# Patient Record
Sex: FEMALE | Race: WHITE | NOT HISPANIC OR LATINO | Employment: FULL TIME | URBAN - METROPOLITAN AREA
[De-identification: names, ages, dates, MRNs, and addresses within clinical notes are randomized per-mention and may not be internally consistent; named-entity substitution may affect disease eponyms.]

---

## 2017-02-13 ENCOUNTER — OFFICE VISIT - HEALTHEAST (OUTPATIENT)
Dept: FAMILY MEDICINE | Facility: CLINIC | Age: 42
End: 2017-02-13

## 2017-02-13 DIAGNOSIS — K21.9 GERD (GASTROESOPHAGEAL REFLUX DISEASE): ICD-10-CM

## 2017-02-13 DIAGNOSIS — F41.8 DEPRESSION WITH ANXIETY: ICD-10-CM

## 2017-02-13 DIAGNOSIS — D48.9 NEOPLASM OF UNCERTAIN BEHAVIOR: ICD-10-CM

## 2017-02-13 DIAGNOSIS — C73 MALIGNANT NEOPLASM OF THYROID GLAND (H): ICD-10-CM

## 2017-02-13 ASSESSMENT — MIFFLIN-ST. JEOR: SCORE: 1577.09

## 2017-02-15 ENCOUNTER — AMBULATORY - HEALTHEAST (OUTPATIENT)
Dept: FAMILY MEDICINE | Facility: CLINIC | Age: 42
End: 2017-02-15

## 2017-06-07 ENCOUNTER — COMMUNICATION - HEALTHEAST (OUTPATIENT)
Dept: ENDOCRINOLOGY | Facility: CLINIC | Age: 42
End: 2017-06-07

## 2017-06-07 DIAGNOSIS — C73 MALIGNANT NEOPLASM OF THYROID GLAND (H): ICD-10-CM

## 2017-06-07 DIAGNOSIS — E89.0 POSTSURGICAL HYPOTHYROIDISM: ICD-10-CM

## 2017-06-09 ENCOUNTER — COMMUNICATION - HEALTHEAST (OUTPATIENT)
Dept: ENDOCRINOLOGY | Facility: CLINIC | Age: 42
End: 2017-06-09

## 2017-06-09 DIAGNOSIS — E89.0 POSTSURGICAL HYPOTHYROIDISM: ICD-10-CM

## 2017-06-09 DIAGNOSIS — C73 MALIGNANT NEOPLASM OF THYROID GLAND (H): ICD-10-CM

## 2017-08-10 ENCOUNTER — OFFICE VISIT - HEALTHEAST (OUTPATIENT)
Dept: FAMILY MEDICINE | Facility: CLINIC | Age: 42
End: 2017-08-10

## 2017-08-10 DIAGNOSIS — K21.9 GASTROESOPHAGEAL REFLUX DISEASE WITHOUT ESOPHAGITIS: ICD-10-CM

## 2017-08-10 DIAGNOSIS — E66.9 OBESITY: ICD-10-CM

## 2017-08-10 ASSESSMENT — MIFFLIN-ST. JEOR: SCORE: 1545.34

## 2017-08-16 ENCOUNTER — COMMUNICATION - HEALTHEAST (OUTPATIENT)
Dept: FAMILY MEDICINE | Facility: CLINIC | Age: 42
End: 2017-08-16

## 2017-08-18 ENCOUNTER — RECORDS - HEALTHEAST (OUTPATIENT)
Dept: ADMINISTRATIVE | Facility: OTHER | Age: 42
End: 2017-08-18

## 2017-09-07 ENCOUNTER — OFFICE VISIT - HEALTHEAST (OUTPATIENT)
Dept: FAMILY MEDICINE | Facility: CLINIC | Age: 42
End: 2017-09-07

## 2017-09-07 DIAGNOSIS — J02.9 SORE THROAT: ICD-10-CM

## 2017-09-07 ASSESSMENT — MIFFLIN-ST. JEOR: SCORE: 1560.31

## 2017-09-25 ENCOUNTER — COMMUNICATION - HEALTHEAST (OUTPATIENT)
Dept: FAMILY MEDICINE | Facility: CLINIC | Age: 42
End: 2017-09-25

## 2017-09-25 DIAGNOSIS — F41.8 DEPRESSION WITH ANXIETY: ICD-10-CM

## 2017-11-03 ENCOUNTER — OFFICE VISIT - HEALTHEAST (OUTPATIENT)
Dept: FAMILY MEDICINE | Facility: CLINIC | Age: 42
End: 2017-11-03

## 2017-11-03 DIAGNOSIS — R10.13 EPIGASTRIC PAIN: ICD-10-CM

## 2017-11-03 DIAGNOSIS — K21.9 GASTROESOPHAGEAL REFLUX DISEASE WITHOUT ESOPHAGITIS: ICD-10-CM

## 2017-11-10 ENCOUNTER — RECORDS - HEALTHEAST (OUTPATIENT)
Dept: ADMINISTRATIVE | Facility: OTHER | Age: 42
End: 2017-11-10

## 2017-11-13 ENCOUNTER — RECORDS - HEALTHEAST (OUTPATIENT)
Dept: ADMINISTRATIVE | Facility: OTHER | Age: 42
End: 2017-11-13

## 2017-12-19 ENCOUNTER — RECORDS - HEALTHEAST (OUTPATIENT)
Dept: LAB | Facility: HOSPITAL | Age: 42
End: 2017-12-19

## 2017-12-20 ENCOUNTER — RECORDS - HEALTHEAST (OUTPATIENT)
Dept: ADMINISTRATIVE | Facility: OTHER | Age: 42
End: 2017-12-20

## 2017-12-20 LAB — HBV SURFACE AB SERPL IA-ACNC: POSITIVE M[IU]/ML

## 2018-01-12 ENCOUNTER — COMMUNICATION - HEALTHEAST (OUTPATIENT)
Dept: FAMILY MEDICINE | Facility: CLINIC | Age: 43
End: 2018-01-12

## 2018-01-12 DIAGNOSIS — F41.8 DEPRESSION WITH ANXIETY: ICD-10-CM

## 2018-01-17 ENCOUNTER — COMMUNICATION - HEALTHEAST (OUTPATIENT)
Dept: FAMILY MEDICINE | Facility: CLINIC | Age: 43
End: 2018-01-17

## 2018-01-17 DIAGNOSIS — K21.9 GASTROESOPHAGEAL REFLUX DISEASE WITHOUT ESOPHAGITIS: ICD-10-CM

## 2018-01-19 ENCOUNTER — COMMUNICATION - HEALTHEAST (OUTPATIENT)
Dept: FAMILY MEDICINE | Facility: CLINIC | Age: 43
End: 2018-01-19

## 2018-01-23 ENCOUNTER — OFFICE VISIT - HEALTHEAST (OUTPATIENT)
Dept: FAMILY MEDICINE | Facility: CLINIC | Age: 43
End: 2018-01-23

## 2018-01-23 DIAGNOSIS — R79.89 ELEVATED LFTS: ICD-10-CM

## 2018-01-23 DIAGNOSIS — K21.9 GERD (GASTROESOPHAGEAL REFLUX DISEASE): ICD-10-CM

## 2018-01-23 DIAGNOSIS — Z01.818 PRE-OP EXAMINATION: ICD-10-CM

## 2018-01-23 DIAGNOSIS — R10.13 EPIGASTRIC PAIN: ICD-10-CM

## 2018-01-23 DIAGNOSIS — R74.8 ELEVATED ALKALINE PHOSPHATASE LEVEL: ICD-10-CM

## 2018-01-23 DIAGNOSIS — R17 ELEVATED BILIRUBIN: ICD-10-CM

## 2018-01-23 LAB
ALBUMIN SERPL-MCNC: 3.3 G/DL (ref 3.5–5)
ALP SERPL-CCNC: 96 U/L (ref 45–120)
ALT SERPL W P-5'-P-CCNC: 24 U/L (ref 0–45)
ANION GAP SERPL CALCULATED.3IONS-SCNC: 6 MMOL/L (ref 5–18)
AST SERPL W P-5'-P-CCNC: 20 U/L (ref 0–40)
BILIRUB SERPL-MCNC: 0.9 MG/DL (ref 0–1)
BUN SERPL-MCNC: 13 MG/DL (ref 8–22)
CALCIUM SERPL-MCNC: 9.2 MG/DL (ref 8.5–10.5)
CHLORIDE BLD-SCNC: 104 MMOL/L (ref 98–107)
CO2 SERPL-SCNC: 28 MMOL/L (ref 22–31)
CREAT SERPL-MCNC: 0.64 MG/DL (ref 0.6–1.1)
ERYTHROCYTE [DISTWIDTH] IN BLOOD BY AUTOMATED COUNT: 12.2 % (ref 11–14.5)
GFR SERPL CREATININE-BSD FRML MDRD: >60 ML/MIN/1.73M2
GLUCOSE BLD-MCNC: 83 MG/DL (ref 70–125)
HCG UR QL: NEGATIVE
HCT VFR BLD AUTO: 35.5 % (ref 35–47)
HGB BLD-MCNC: 12.2 G/DL (ref 12–16)
MCH RBC QN AUTO: 29.5 PG (ref 27–34)
MCHC RBC AUTO-ENTMCNC: 34.4 G/DL (ref 32–36)
MCV RBC AUTO: 86 FL (ref 80–100)
PLATELET # BLD AUTO: 238 THOU/UL (ref 140–440)
PMV BLD AUTO: 7.3 FL (ref 7–10)
POTASSIUM BLD-SCNC: 4 MMOL/L (ref 3.5–5)
PROT SERPL-MCNC: 6.5 G/DL (ref 6–8)
RBC # BLD AUTO: 4.14 MILL/UL (ref 3.8–5.4)
SODIUM SERPL-SCNC: 138 MMOL/L (ref 136–145)
SP GR UR STRIP: 1.01 (ref 1–1.03)
WBC: 6.8 THOU/UL (ref 4–11)

## 2018-01-23 ASSESSMENT — MIFFLIN-ST. JEOR: SCORE: 1536.27

## 2018-05-21 ENCOUNTER — COMMUNICATION - HEALTHEAST (OUTPATIENT)
Dept: ENDOCRINOLOGY | Facility: CLINIC | Age: 43
End: 2018-05-21

## 2018-05-21 DIAGNOSIS — E89.0 POSTSURGICAL HYPOTHYROIDISM: ICD-10-CM

## 2018-05-21 DIAGNOSIS — C73 MALIGNANT NEOPLASM OF THYROID GLAND (H): ICD-10-CM

## 2018-06-04 ENCOUNTER — COMMUNICATION - HEALTHEAST (OUTPATIENT)
Dept: ENDOCRINOLOGY | Facility: CLINIC | Age: 43
End: 2018-06-04

## 2018-06-04 DIAGNOSIS — E89.0 POSTSURGICAL HYPOTHYROIDISM: ICD-10-CM

## 2018-06-04 DIAGNOSIS — C73 MALIGNANT NEOPLASM OF THYROID GLAND (H): ICD-10-CM

## 2018-06-21 ENCOUNTER — OFFICE VISIT - HEALTHEAST (OUTPATIENT)
Dept: FAMILY MEDICINE | Facility: CLINIC | Age: 43
End: 2018-06-21

## 2018-06-21 DIAGNOSIS — J45.909 ASTHMA: ICD-10-CM

## 2018-06-21 DIAGNOSIS — R05.9 COUGH: ICD-10-CM

## 2018-06-21 ASSESSMENT — MIFFLIN-ST. JEOR: SCORE: 1545.34

## 2018-08-16 ENCOUNTER — COMMUNICATION - HEALTHEAST (OUTPATIENT)
Dept: FAMILY MEDICINE | Facility: CLINIC | Age: 43
End: 2018-08-16

## 2018-08-20 ENCOUNTER — COMMUNICATION - HEALTHEAST (OUTPATIENT)
Dept: FAMILY MEDICINE | Facility: CLINIC | Age: 43
End: 2018-08-20

## 2018-08-23 ENCOUNTER — AMBULATORY - HEALTHEAST (OUTPATIENT)
Dept: FAMILY MEDICINE | Facility: CLINIC | Age: 43
End: 2018-08-23

## 2018-08-23 DIAGNOSIS — M72.2 PLANTAR FASCIITIS: ICD-10-CM

## 2018-08-28 ENCOUNTER — AMBULATORY - HEALTHEAST (OUTPATIENT)
Dept: ENDOCRINOLOGY | Facility: CLINIC | Age: 43
End: 2018-08-28

## 2018-08-28 DIAGNOSIS — E89.0 POSTSURGICAL HYPOTHYROIDISM: ICD-10-CM

## 2018-09-02 ENCOUNTER — COMMUNICATION - HEALTHEAST (OUTPATIENT)
Dept: ENDOCRINOLOGY | Facility: CLINIC | Age: 43
End: 2018-09-02

## 2018-09-02 ENCOUNTER — RECORDS - HEALTHEAST (OUTPATIENT)
Dept: ADMINISTRATIVE | Facility: OTHER | Age: 43
End: 2018-09-02

## 2018-09-02 DIAGNOSIS — E89.0 POSTSURGICAL HYPOTHYROIDISM: ICD-10-CM

## 2018-09-02 DIAGNOSIS — C73 MALIGNANT NEOPLASM OF THYROID GLAND (H): ICD-10-CM

## 2018-09-10 ENCOUNTER — AMBULATORY - HEALTHEAST (OUTPATIENT)
Dept: LAB | Facility: CLINIC | Age: 43
End: 2018-09-10

## 2018-09-10 DIAGNOSIS — E89.0 POSTSURGICAL HYPOTHYROIDISM: ICD-10-CM

## 2018-09-10 LAB
CREAT SERPL-MCNC: 0.62 MG/DL (ref 0.6–1.1)
GFR SERPL CREATININE-BSD FRML MDRD: >60 ML/MIN/1.73M2
HBA1C MFR BLD: 5.1 % (ref 3.5–6)
POTASSIUM BLD-SCNC: 3.9 MMOL/L (ref 3.5–5)
T3 SERPL-MCNC: 93 NG/DL (ref 45–175)
T4 FREE SERPL-MCNC: 1.5 NG/DL (ref 0.7–1.8)
TSH SERPL DL<=0.005 MIU/L-ACNC: 0.05 UIU/ML (ref 0.3–5)

## 2018-09-11 ENCOUNTER — RECORDS - HEALTHEAST (OUTPATIENT)
Dept: ADMINISTRATIVE | Facility: OTHER | Age: 43
End: 2018-09-11

## 2018-09-11 LAB
THYROGLOB AB SERPL-ACNC: <0.9 IU/ML (ref 0–4)
THYROGLOB SERPL-MCNC: ABNORMAL NG/ML (ref 1.3–31.8)
THYROGLOBULIN, SERUM OR: 0.1 NG/ML (ref 1.3–31.8)

## 2018-09-19 ENCOUNTER — COMMUNICATION - HEALTHEAST (OUTPATIENT)
Dept: FAMILY MEDICINE | Facility: CLINIC | Age: 43
End: 2018-09-19

## 2018-09-19 DIAGNOSIS — F41.8 DEPRESSION WITH ANXIETY: ICD-10-CM

## 2018-09-28 ENCOUNTER — COMMUNICATION - HEALTHEAST (OUTPATIENT)
Dept: ENDOCRINOLOGY | Facility: CLINIC | Age: 43
End: 2018-09-28

## 2018-09-28 DIAGNOSIS — E89.0 POSTSURGICAL HYPOTHYROIDISM: ICD-10-CM

## 2018-09-28 DIAGNOSIS — C73 MALIGNANT NEOPLASM OF THYROID GLAND (H): ICD-10-CM

## 2018-10-02 ENCOUNTER — RECORDS - HEALTHEAST (OUTPATIENT)
Dept: ADMINISTRATIVE | Facility: OTHER | Age: 43
End: 2018-10-02

## 2018-12-31 ENCOUNTER — COMMUNICATION - HEALTHEAST (OUTPATIENT)
Dept: ENDOCRINOLOGY | Facility: CLINIC | Age: 43
End: 2018-12-31

## 2018-12-31 DIAGNOSIS — C73 MALIGNANT NEOPLASM OF THYROID GLAND (H): ICD-10-CM

## 2018-12-31 DIAGNOSIS — E89.0 POSTSURGICAL HYPOTHYROIDISM: ICD-10-CM

## 2019-01-22 ENCOUNTER — OFFICE VISIT - HEALTHEAST (OUTPATIENT)
Dept: ENDOCRINOLOGY | Facility: CLINIC | Age: 44
End: 2019-01-22

## 2019-01-22 DIAGNOSIS — C73 MALIGNANT NEOPLASM OF THYROID GLAND (H): ICD-10-CM

## 2019-01-22 DIAGNOSIS — E89.0 POSTSURGICAL HYPOTHYROIDISM: ICD-10-CM

## 2019-01-22 ASSESSMENT — MIFFLIN-ST. JEOR: SCORE: 1573.01

## 2019-05-23 ENCOUNTER — OFFICE VISIT - HEALTHEAST (OUTPATIENT)
Dept: FAMILY MEDICINE | Facility: CLINIC | Age: 44
End: 2019-05-23

## 2019-05-23 DIAGNOSIS — F33.0 MILD EPISODE OF RECURRENT MAJOR DEPRESSIVE DISORDER (H): ICD-10-CM

## 2019-05-23 DIAGNOSIS — J45.21 MILD INTERMITTENT ASTHMA WITH ACUTE EXACERBATION: ICD-10-CM

## 2019-05-23 DIAGNOSIS — Z12.31 ENCOUNTER FOR SCREENING MAMMOGRAM FOR BREAST CANCER: ICD-10-CM

## 2019-05-23 DIAGNOSIS — L30.9 ECZEMA OF HAND: ICD-10-CM

## 2019-05-23 DIAGNOSIS — R87.610 PAPANICOLAOU SMEAR OF CERVIX WITH ATYPICAL SQUAMOUS CELLS OF UNDETERMINED SIGNIFICANCE (ASC-US): ICD-10-CM

## 2019-05-23 DIAGNOSIS — E66.9 OBESITY (BMI 35.0-39.9 WITHOUT COMORBIDITY): ICD-10-CM

## 2019-05-23 RX ORDER — ALBUTEROL SULFATE 90 UG/1
2 AEROSOL, METERED RESPIRATORY (INHALATION) EVERY 6 HOURS PRN
Qty: 1 INHALER | Refills: 3 | Status: SHIPPED | OUTPATIENT
Start: 2019-05-23 | End: 2023-03-08

## 2019-05-23 ASSESSMENT — MIFFLIN-ST. JEOR: SCORE: 1547.61

## 2019-05-29 ENCOUNTER — COMMUNICATION - HEALTHEAST (OUTPATIENT)
Dept: FAMILY MEDICINE | Facility: CLINIC | Age: 44
End: 2019-05-29

## 2019-05-29 DIAGNOSIS — J45.21 MILD INTERMITTENT ASTHMA WITH ACUTE EXACERBATION: ICD-10-CM

## 2019-06-04 ENCOUNTER — OFFICE VISIT - HEALTHEAST (OUTPATIENT)
Dept: FAMILY MEDICINE | Facility: CLINIC | Age: 44
End: 2019-06-04

## 2019-06-04 ENCOUNTER — COMMUNICATION - HEALTHEAST (OUTPATIENT)
Dept: SCHEDULING | Facility: CLINIC | Age: 44
End: 2019-06-04

## 2019-06-04 DIAGNOSIS — R32 URINE INCONTINENCE: ICD-10-CM

## 2019-06-04 DIAGNOSIS — K85.90 ACUTE PANCREATITIS, UNSPECIFIED COMPLICATION STATUS, UNSPECIFIED PANCREATITIS TYPE: ICD-10-CM

## 2019-06-12 ENCOUNTER — RECORDS - HEALTHEAST (OUTPATIENT)
Dept: ADMINISTRATIVE | Facility: OTHER | Age: 44
End: 2019-06-12

## 2019-06-19 ENCOUNTER — COMMUNICATION - HEALTHEAST (OUTPATIENT)
Dept: CARE COORDINATION | Facility: CLINIC | Age: 44
End: 2019-06-19

## 2019-06-21 ENCOUNTER — AMBULATORY - HEALTHEAST (OUTPATIENT)
Dept: FAMILY MEDICINE | Facility: CLINIC | Age: 44
End: 2019-06-21

## 2019-06-21 ENCOUNTER — OFFICE VISIT - HEALTHEAST (OUTPATIENT)
Dept: FAMILY MEDICINE | Facility: CLINIC | Age: 44
End: 2019-06-21

## 2019-06-21 DIAGNOSIS — R10.9 ABDOMINAL PAIN, UNSPECIFIED ABDOMINAL LOCATION: ICD-10-CM

## 2019-06-21 DIAGNOSIS — Z09 ENCOUNTER FOR EXAMINATION FOLLOWING TREATMENT AT HOSPITAL: ICD-10-CM

## 2019-06-21 DIAGNOSIS — J45.30 MILD PERSISTENT ASTHMA WITHOUT COMPLICATION: ICD-10-CM

## 2019-06-21 DIAGNOSIS — R17 JAUNDICE: ICD-10-CM

## 2019-06-21 ASSESSMENT — MIFFLIN-ST. JEOR: SCORE: 1549.87

## 2019-06-25 ENCOUNTER — HOSPITAL ENCOUNTER (OUTPATIENT)
Dept: ULTRASOUND IMAGING | Facility: CLINIC | Age: 44
Discharge: HOME | End: 2019-06-25
Attending: INTERNAL MEDICINE

## 2019-06-25 DIAGNOSIS — C73 MALIGNANT NEOPLASM OF THYROID GLAND (H): ICD-10-CM

## 2019-06-26 ENCOUNTER — COMMUNICATION - HEALTHEAST (OUTPATIENT)
Dept: ENDOCRINOLOGY | Facility: CLINIC | Age: 44
End: 2019-06-26

## 2019-07-15 ENCOUNTER — RECORDS - HEALTHEAST (OUTPATIENT)
Dept: ADMINISTRATIVE | Facility: OTHER | Age: 44
End: 2019-07-15

## 2019-07-31 ENCOUNTER — COMMUNICATION - HEALTHEAST (OUTPATIENT)
Dept: ENDOCRINOLOGY | Facility: CLINIC | Age: 44
End: 2019-07-31

## 2019-07-31 DIAGNOSIS — E89.0 POSTSURGICAL HYPOTHYROIDISM: ICD-10-CM

## 2019-07-31 DIAGNOSIS — C73 MALIGNANT NEOPLASM OF THYROID GLAND (H): ICD-10-CM

## 2019-08-01 ENCOUNTER — COMMUNICATION - HEALTHEAST (OUTPATIENT)
Dept: FAMILY MEDICINE | Facility: CLINIC | Age: 44
End: 2019-08-01

## 2019-08-02 ENCOUNTER — HOSPITAL ENCOUNTER (OUTPATIENT)
Dept: MAMMOGRAPHY | Facility: CLINIC | Age: 44
Discharge: HOME | End: 2019-08-02

## 2019-08-02 DIAGNOSIS — Z12.31 ENCOUNTER FOR SCREENING MAMMOGRAM FOR BREAST CANCER: ICD-10-CM

## 2019-08-06 ENCOUNTER — COMMUNICATION - HEALTHEAST (OUTPATIENT)
Dept: ENDOCRINOLOGY | Facility: CLINIC | Age: 44
End: 2019-08-06

## 2019-08-06 DIAGNOSIS — C73 MALIGNANT NEOPLASM OF THYROID GLAND (H): ICD-10-CM

## 2019-08-06 DIAGNOSIS — E89.0 POSTSURGICAL HYPOTHYROIDISM: ICD-10-CM

## 2019-08-09 ENCOUNTER — COMMUNICATION - HEALTHEAST (OUTPATIENT)
Dept: FAMILY MEDICINE | Facility: CLINIC | Age: 44
End: 2019-08-09

## 2019-08-13 ENCOUNTER — OFFICE VISIT - HEALTHEAST (OUTPATIENT)
Dept: FAMILY MEDICINE | Facility: CLINIC | Age: 44
End: 2019-08-13

## 2019-08-13 DIAGNOSIS — Z01.818 ENCOUNTER FOR PREOPERATIVE EXAMINATION FOR GENERAL SURGICAL PROCEDURE: ICD-10-CM

## 2019-08-13 DIAGNOSIS — R10.13 EPIGASTRIC PAIN: ICD-10-CM

## 2019-08-13 DIAGNOSIS — R79.89 ELEVATED LFTS: ICD-10-CM

## 2019-08-13 DIAGNOSIS — R74.8 ELEVATED ALKALINE PHOSPHATASE LEVEL: ICD-10-CM

## 2019-08-13 DIAGNOSIS — E66.01 MORBID OBESITY (H): ICD-10-CM

## 2019-08-13 DIAGNOSIS — L91.8 SKIN TAG: ICD-10-CM

## 2019-08-13 DIAGNOSIS — R17 ELEVATED BILIRUBIN: ICD-10-CM

## 2019-08-13 DIAGNOSIS — K83.4 SPHINCTER OF ODDI DYSFUNCTION: ICD-10-CM

## 2019-08-13 LAB
ANION GAP SERPL CALCULATED.3IONS-SCNC: 8 MMOL/L (ref 5–18)
BUN SERPL-MCNC: 14 MG/DL (ref 8–22)
CALCIUM SERPL-MCNC: 9.9 MG/DL (ref 8.5–10.5)
CHLORIDE BLD-SCNC: 108 MMOL/L (ref 98–107)
CO2 SERPL-SCNC: 26 MMOL/L (ref 22–31)
CREAT SERPL-MCNC: 0.66 MG/DL (ref 0.6–1.1)
GFR SERPL CREATININE-BSD FRML MDRD: >60 ML/MIN/1.73M2
GLUCOSE BLD-MCNC: 89 MG/DL (ref 70–125)
HCG UR QL: NEGATIVE
HGB BLD-MCNC: 13 G/DL (ref 12–16)
POTASSIUM BLD-SCNC: 4 MMOL/L (ref 3.5–5)
SODIUM SERPL-SCNC: 142 MMOL/L (ref 136–145)

## 2019-08-13 ASSESSMENT — MIFFLIN-ST. JEOR: SCORE: 1572.55

## 2019-08-16 ENCOUNTER — ANESTHESIA - HEALTHEAST (OUTPATIENT)
Dept: SURGERY | Facility: HOSPITAL | Age: 44
End: 2019-08-16

## 2019-08-16 ENCOUNTER — SURGERY - HEALTHEAST (OUTPATIENT)
Dept: SURGERY | Facility: HOSPITAL | Age: 44
End: 2019-08-16

## 2019-08-30 ENCOUNTER — COMMUNICATION - HEALTHEAST (OUTPATIENT)
Dept: ADMINISTRATIVE | Facility: CLINIC | Age: 44
End: 2019-08-30

## 2019-10-12 ENCOUNTER — COMMUNICATION - HEALTHEAST (OUTPATIENT)
Dept: ENDOCRINOLOGY | Facility: CLINIC | Age: 44
End: 2019-10-12

## 2019-10-12 DIAGNOSIS — E89.0 POSTSURGICAL HYPOTHYROIDISM: ICD-10-CM

## 2019-10-12 DIAGNOSIS — C73 MALIGNANT NEOPLASM OF THYROID GLAND (H): ICD-10-CM

## 2019-10-14 ENCOUNTER — COMMUNICATION - HEALTHEAST (OUTPATIENT)
Dept: FAMILY MEDICINE | Facility: CLINIC | Age: 44
End: 2019-10-14

## 2019-10-16 ENCOUNTER — OFFICE VISIT - HEALTHEAST (OUTPATIENT)
Dept: FAMILY MEDICINE | Facility: CLINIC | Age: 44
End: 2019-10-16

## 2019-10-16 ENCOUNTER — COMMUNICATION - HEALTHEAST (OUTPATIENT)
Dept: SCHEDULING | Facility: CLINIC | Age: 44
End: 2019-10-16

## 2019-10-16 DIAGNOSIS — E89.0 POSTSURGICAL HYPOTHYROIDISM: ICD-10-CM

## 2019-10-16 DIAGNOSIS — C73 MALIGNANT NEOPLASM OF THYROID GLAND (H): ICD-10-CM

## 2019-10-16 LAB
T3FREE SERPL-MCNC: 3.9 PG/ML (ref 1.9–3.9)
T4 FREE SERPL-MCNC: 1.3 NG/DL (ref 0.7–1.8)
TSH SERPL DL<=0.005 MIU/L-ACNC: 0.03 UIU/ML (ref 0.3–5)

## 2019-10-16 ASSESSMENT — ANXIETY QUESTIONNAIRES
6. BECOMING EASILY ANNOYED OR IRRITABLE: MORE THAN HALF THE DAYS
7. FEELING AFRAID AS IF SOMETHING AWFUL MIGHT HAPPEN: NOT AT ALL
2. NOT BEING ABLE TO STOP OR CONTROL WORRYING: NOT AT ALL
1. FEELING NERVOUS, ANXIOUS, OR ON EDGE: MORE THAN HALF THE DAYS
IF YOU CHECKED OFF ANY PROBLEMS ON THIS QUESTIONNAIRE, HOW DIFFICULT HAVE THESE PROBLEMS MADE IT FOR YOU TO DO YOUR WORK, TAKE CARE OF THINGS AT HOME, OR GET ALONG WITH OTHER PEOPLE: SOMEWHAT DIFFICULT
5. BEING SO RESTLESS THAT IT IS HARD TO SIT STILL: NOT AT ALL
4. TROUBLE RELAXING: NOT AT ALL
3. WORRYING TOO MUCH ABOUT DIFFERENT THINGS: NOT AT ALL
GAD7 TOTAL SCORE: 4

## 2019-10-16 ASSESSMENT — PATIENT HEALTH QUESTIONNAIRE - PHQ9: SUM OF ALL RESPONSES TO PHQ QUESTIONS 1-9: 1

## 2019-12-03 ENCOUNTER — COMMUNICATION - HEALTHEAST (OUTPATIENT)
Dept: FAMILY MEDICINE | Facility: CLINIC | Age: 44
End: 2019-12-03

## 2019-12-03 DIAGNOSIS — F41.8 DEPRESSION WITH ANXIETY: ICD-10-CM

## 2019-12-07 ENCOUNTER — COMMUNICATION - HEALTHEAST (OUTPATIENT)
Dept: FAMILY MEDICINE | Facility: CLINIC | Age: 44
End: 2019-12-07

## 2019-12-07 DIAGNOSIS — J45.21 MILD INTERMITTENT ASTHMA WITH ACUTE EXACERBATION: ICD-10-CM

## 2019-12-11 ENCOUNTER — COMMUNICATION - HEALTHEAST (OUTPATIENT)
Dept: FAMILY MEDICINE | Facility: CLINIC | Age: 44
End: 2019-12-11

## 2019-12-11 DIAGNOSIS — K83.4 SPHINCTER OF ODDI DYSFUNCTION: ICD-10-CM

## 2020-01-04 ENCOUNTER — COMMUNICATION - HEALTHEAST (OUTPATIENT)
Dept: FAMILY MEDICINE | Facility: CLINIC | Age: 45
End: 2020-01-04

## 2020-01-04 DIAGNOSIS — J45.21 MILD INTERMITTENT ASTHMA WITH ACUTE EXACERBATION: ICD-10-CM

## 2020-01-22 ENCOUNTER — COMMUNICATION - HEALTHEAST (OUTPATIENT)
Dept: FAMILY MEDICINE | Facility: CLINIC | Age: 45
End: 2020-01-22

## 2020-01-22 DIAGNOSIS — C73 MALIGNANT NEOPLASM OF THYROID GLAND (H): ICD-10-CM

## 2020-01-22 DIAGNOSIS — K83.4 SPHINCTER OF ODDI DYSFUNCTION: ICD-10-CM

## 2020-01-22 DIAGNOSIS — E89.0 POSTSURGICAL HYPOTHYROIDISM: ICD-10-CM

## 2020-02-19 ENCOUNTER — COMMUNICATION - HEALTHEAST (OUTPATIENT)
Dept: FAMILY MEDICINE | Facility: CLINIC | Age: 45
End: 2020-02-19

## 2020-02-27 ENCOUNTER — OFFICE VISIT - HEALTHEAST (OUTPATIENT)
Dept: FAMILY MEDICINE | Facility: CLINIC | Age: 45
End: 2020-02-27

## 2020-02-27 DIAGNOSIS — N76.0 VAGINITIS AND VULVOVAGINITIS: ICD-10-CM

## 2020-02-27 DIAGNOSIS — N95.2 ATROPHIC VAGINITIS: ICD-10-CM

## 2020-02-28 ENCOUNTER — OFFICE VISIT - HEALTHEAST (OUTPATIENT)
Dept: FAMILY MEDICINE | Facility: CLINIC | Age: 45
End: 2020-02-28

## 2020-02-28 DIAGNOSIS — N89.8 VAGINAL ITCHING: ICD-10-CM

## 2020-02-28 LAB
CLUE CELLS: NORMAL
TRICHOMONAS, WET PREP: NORMAL
YEAST, WET PREP: NORMAL

## 2020-02-28 ASSESSMENT — MIFFLIN-ST. JEOR: SCORE: 1605.72

## 2020-08-06 ENCOUNTER — COMMUNICATION - HEALTHEAST (OUTPATIENT)
Dept: FAMILY MEDICINE | Facility: CLINIC | Age: 45
End: 2020-08-06

## 2020-08-06 DIAGNOSIS — E89.0 POSTSURGICAL HYPOTHYROIDISM: ICD-10-CM

## 2020-08-06 DIAGNOSIS — C73 MALIGNANT NEOPLASM OF THYROID GLAND (H): ICD-10-CM

## 2020-12-01 ENCOUNTER — COMMUNICATION - HEALTHEAST (OUTPATIENT)
Dept: FAMILY MEDICINE | Facility: CLINIC | Age: 45
End: 2020-12-01

## 2020-12-01 DIAGNOSIS — C73 MALIGNANT NEOPLASM OF THYROID GLAND (H): ICD-10-CM

## 2020-12-01 DIAGNOSIS — E89.0 POSTSURGICAL HYPOTHYROIDISM: ICD-10-CM

## 2020-12-09 ENCOUNTER — OFFICE VISIT - HEALTHEAST (OUTPATIENT)
Dept: FAMILY MEDICINE | Facility: CLINIC | Age: 45
End: 2020-12-09

## 2020-12-09 ENCOUNTER — COMMUNICATION - HEALTHEAST (OUTPATIENT)
Dept: FAMILY MEDICINE | Facility: CLINIC | Age: 45
End: 2020-12-09

## 2020-12-09 DIAGNOSIS — K21.9 GASTROESOPHAGEAL REFLUX DISEASE WITHOUT ESOPHAGITIS: ICD-10-CM

## 2020-12-09 DIAGNOSIS — E89.0 POSTSURGICAL HYPOTHYROIDISM: ICD-10-CM

## 2020-12-09 DIAGNOSIS — J45.30 MILD PERSISTENT ASTHMA WITHOUT COMPLICATION: ICD-10-CM

## 2020-12-09 DIAGNOSIS — R79.89 ELEVATED LFTS: ICD-10-CM

## 2020-12-09 DIAGNOSIS — Z13.220 LIPID SCREENING: ICD-10-CM

## 2020-12-09 DIAGNOSIS — Z12.31 ENCOUNTER FOR SCREENING MAMMOGRAM FOR BREAST CANCER: ICD-10-CM

## 2020-12-09 DIAGNOSIS — C73 MALIGNANT NEOPLASM OF THYROID GLAND (H): ICD-10-CM

## 2020-12-09 DIAGNOSIS — Z11.4 SCREENING FOR HIV (HUMAN IMMUNODEFICIENCY VIRUS): ICD-10-CM

## 2020-12-09 DIAGNOSIS — F41.8 DEPRESSION WITH ANXIETY: ICD-10-CM

## 2020-12-09 DIAGNOSIS — E66.01 MORBID OBESITY (H): ICD-10-CM

## 2020-12-09 LAB
ALBUMIN SERPL-MCNC: 3.7 G/DL (ref 3.5–5)
ALP SERPL-CCNC: 95 U/L (ref 45–120)
ALT SERPL W P-5'-P-CCNC: 19 U/L (ref 0–45)
ANION GAP SERPL CALCULATED.3IONS-SCNC: 6 MMOL/L (ref 5–18)
AST SERPL W P-5'-P-CCNC: 19 U/L (ref 0–40)
BILIRUB SERPL-MCNC: 1.3 MG/DL (ref 0–1)
BUN SERPL-MCNC: 20 MG/DL (ref 8–22)
CALCIUM SERPL-MCNC: 9.4 MG/DL (ref 8.5–10.5)
CHLORIDE BLD-SCNC: 105 MMOL/L (ref 98–107)
CHOLEST SERPL-MCNC: 161 MG/DL
CO2 SERPL-SCNC: 27 MMOL/L (ref 22–31)
CREAT SERPL-MCNC: 0.72 MG/DL (ref 0.6–1.1)
FASTING STATUS PATIENT QL REPORTED: NO
GFR SERPL CREATININE-BSD FRML MDRD: >60 ML/MIN/1.73M2
GLUCOSE BLD-MCNC: 95 MG/DL (ref 70–125)
HDLC SERPL-MCNC: 60 MG/DL
HIV 1+2 AB+HIV1 P24 AG SERPL QL IA: NEGATIVE
LDLC SERPL CALC-MCNC: 83 MG/DL
POTASSIUM BLD-SCNC: 4 MMOL/L (ref 3.5–5)
PROT SERPL-MCNC: 6.7 G/DL (ref 6–8)
SODIUM SERPL-SCNC: 138 MMOL/L (ref 136–145)
T4 FREE SERPL-MCNC: 1.2 NG/DL (ref 0.7–1.8)
TRIGL SERPL-MCNC: 91 MG/DL
TSH SERPL DL<=0.005 MIU/L-ACNC: 0.05 UIU/ML (ref 0.3–5)

## 2020-12-09 ASSESSMENT — ANXIETY QUESTIONNAIRES
6. BECOMING EASILY ANNOYED OR IRRITABLE: MORE THAN HALF THE DAYS
IF YOU CHECKED OFF ANY PROBLEMS ON THIS QUESTIONNAIRE, HOW DIFFICULT HAVE THESE PROBLEMS MADE IT FOR YOU TO DO YOUR WORK, TAKE CARE OF THINGS AT HOME, OR GET ALONG WITH OTHER PEOPLE: SOMEWHAT DIFFICULT
7. FEELING AFRAID AS IF SOMETHING AWFUL MIGHT HAPPEN: SEVERAL DAYS
1. FEELING NERVOUS, ANXIOUS, OR ON EDGE: MORE THAN HALF THE DAYS
4. TROUBLE RELAXING: NOT AT ALL
5. BEING SO RESTLESS THAT IT IS HARD TO SIT STILL: NOT AT ALL
2. NOT BEING ABLE TO STOP OR CONTROL WORRYING: SEVERAL DAYS
3. WORRYING TOO MUCH ABOUT DIFFERENT THINGS: SEVERAL DAYS
GAD7 TOTAL SCORE: 7

## 2020-12-09 ASSESSMENT — PATIENT HEALTH QUESTIONNAIRE - PHQ9: SUM OF ALL RESPONSES TO PHQ QUESTIONS 1-9: 7

## 2020-12-09 ASSESSMENT — MIFFLIN-ST. JEOR: SCORE: 1548.74

## 2021-02-08 ENCOUNTER — OFFICE VISIT - HEALTHEAST (OUTPATIENT)
Dept: FAMILY MEDICINE | Facility: CLINIC | Age: 46
End: 2021-02-08

## 2021-02-08 DIAGNOSIS — E89.0 POSTSURGICAL HYPOTHYROIDISM: ICD-10-CM

## 2021-02-08 DIAGNOSIS — L30.9 ECZEMA OF HAND: ICD-10-CM

## 2021-02-08 DIAGNOSIS — C73 MALIGNANT NEOPLASM OF THYROID GLAND (H): ICD-10-CM

## 2021-02-08 DIAGNOSIS — K83.4 SPHINCTER OF ODDI DYSFUNCTION: ICD-10-CM

## 2021-02-08 DIAGNOSIS — F41.8 DEPRESSION WITH ANXIETY: ICD-10-CM

## 2021-02-08 RX ORDER — TRIAMCINOLONE ACETONIDE 5 MG/G
CREAM TOPICAL
Qty: 30 G | Refills: 2 | Status: SHIPPED | OUTPATIENT
Start: 2021-02-08 | End: 2022-10-13

## 2021-02-08 RX ORDER — LEVOTHYROXINE SODIUM 200 UG/1
TABLET ORAL
Qty: 90 TABLET | Refills: 1 | Status: SHIPPED | OUTPATIENT
Start: 2021-02-08 | End: 2021-08-20

## 2021-02-08 ASSESSMENT — ANXIETY QUESTIONNAIRES
6. BECOMING EASILY ANNOYED OR IRRITABLE: MORE THAN HALF THE DAYS
3. WORRYING TOO MUCH ABOUT DIFFERENT THINGS: NEARLY EVERY DAY
1. FEELING NERVOUS, ANXIOUS, OR ON EDGE: MORE THAN HALF THE DAYS
4. TROUBLE RELAXING: MORE THAN HALF THE DAYS
5. BEING SO RESTLESS THAT IT IS HARD TO SIT STILL: NOT AT ALL
IF YOU CHECKED OFF ANY PROBLEMS ON THIS QUESTIONNAIRE, HOW DIFFICULT HAVE THESE PROBLEMS MADE IT FOR YOU TO DO YOUR WORK, TAKE CARE OF THINGS AT HOME, OR GET ALONG WITH OTHER PEOPLE: SOMEWHAT DIFFICULT
2. NOT BEING ABLE TO STOP OR CONTROL WORRYING: NEARLY EVERY DAY
7. FEELING AFRAID AS IF SOMETHING AWFUL MIGHT HAPPEN: MORE THAN HALF THE DAYS
GAD7 TOTAL SCORE: 14

## 2021-02-08 ASSESSMENT — PATIENT HEALTH QUESTIONNAIRE - PHQ9: SUM OF ALL RESPONSES TO PHQ QUESTIONS 1-9: 17

## 2021-04-26 ENCOUNTER — COMMUNICATION - HEALTHEAST (OUTPATIENT)
Dept: FAMILY MEDICINE | Facility: CLINIC | Age: 46
End: 2021-04-26

## 2021-04-26 DIAGNOSIS — F41.8 DEPRESSION WITH ANXIETY: ICD-10-CM

## 2021-05-14 ENCOUNTER — COMMUNICATION - HEALTHEAST (OUTPATIENT)
Dept: FAMILY MEDICINE | Facility: CLINIC | Age: 46
End: 2021-05-14

## 2021-05-19 ENCOUNTER — TRANSCRIBE ORDERS (OUTPATIENT)
Dept: OTHER | Age: 46
End: 2021-05-19

## 2021-05-19 DIAGNOSIS — K83.1 BILIARY STRICTURE (H): ICD-10-CM

## 2021-05-19 DIAGNOSIS — Z98.890 HISTORY OF SPHINCTEROTOMY OF SPHINCTER OF ODDI: Primary | ICD-10-CM

## 2021-05-26 ASSESSMENT — PATIENT HEALTH QUESTIONNAIRE - PHQ9: SUM OF ALL RESPONSES TO PHQ QUESTIONS 1-9: 1

## 2021-05-27 ASSESSMENT — PATIENT HEALTH QUESTIONNAIRE - PHQ9
SUM OF ALL RESPONSES TO PHQ QUESTIONS 1-9: 7
SUM OF ALL RESPONSES TO PHQ QUESTIONS 1-9: 17

## 2021-05-28 ENCOUNTER — TELEPHONE (OUTPATIENT)
Dept: GASTROENTEROLOGY | Facility: CLINIC | Age: 46
End: 2021-05-28

## 2021-05-28 ASSESSMENT — ASTHMA QUESTIONNAIRES
ACT_TOTALSCORE: 16
ACT_TOTALSCORE: 21

## 2021-05-28 ASSESSMENT — ANXIETY QUESTIONNAIRES
GAD7 TOTAL SCORE: 7
GAD7 TOTAL SCORE: 4
GAD7 TOTAL SCORE: 14

## 2021-05-28 NOTE — TELEPHONE ENCOUNTER
Advanced [facility]oscopy     Referring provider:  Stephany Bell    Referred to: [facility]     Provider Requested: NA     Referral Received: 5/19/21     Records received:  In CareEverywhere     Images received: none    Evaluation for:ERCP with spyglass                         .  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     Indications:         Abnormal MRCP: There is intrahepatic biliary ductal                        dilatation with some narrowing of the right and left                        ducts at the confluence with the common hepatic duct.                        Although no mass lesion is seen, this does raise the                        possibility of a biliary lesion or stricture in this                        location. Common bile duct tapers to a normal caliber                        in the head of the pancreas and there is no evidence                        of choledocholithiasis., Evaluation and possible                        treatment of bile duct stone(s), Exclusion of                        ascending cholangitis. 46 y.o.female with a history                        of lap ezio, SOD s/p ERCP x 2 (last in 8/2019),                        papillary thyroid carcinoma s/p thyroidectomy,                        hypothyroidism and anxiety was admitted for                        evaluation of acute abdominal pain and nausea.                       Hx of Sphincter of Oddi dysfunction, Acute pain,                        Elevated LFTs, Leukocytosis/fevers                       Patient with above history s/p ERCP x 2 (last 2019)                         presented with acute abdominal pain associated with                        nausea and now fevers/leukocytosis concerning for                        infection/ascending cholangitis. She has had sludge                        previously which may be contributing to suspected                        obstruction and rise in LFTs    ERCP 5/11/21    Recommendation:      - Watch for pancreatitis, bleeding, perforation, and                        cholangitis.                       - Observe patient's clinical course.                       - Avoid aspirin and nonsteroidal anti-inflammatory                        medicines for 1 week.                       - IV fluid for hydration to prevent pancreatitis.                       - [facility] possible                        SpyGlass evaluation of this mild stricture that is                        likely fibrotic, and to R/O other lesions.    MRCP 5/10/21    IMPRESSION:  1.  There is intrahepatic biliary ductal dilatation with some narrowing of the right and left ducts at the confluence with the common hepatic duct. Although no mass lesion is seen, this does raise the possibility of a biliary lesion or stricture in this location. Consider ERCP for further evaluation.    2.  Common bile duct tapers to a normal caliber in the head of the pancreas and there is no evidence of choledocholithiasis.    MD review date: 5/28/21  MD Decision for clinic consultation/Orders:            Referral updates/Patient contacted:

## 2021-05-29 NOTE — PROGRESS NOTES
1. Encounter for examination following treatment at hospital     2. Abdominal pain, unspecified abdominal location  Ambulatory referral to Gastroenterology   3. Jaundice  Ambulatory referral to Gastroenterology   4. Mild persistent asthma without complication           ASSESSMENT/PLAN:     The following high BMI interventions were performed this visit: encouragement to exercise and weight monitoring    1. Encounter for examination following treatment at hospital      2. Abdominal pain, unspecified abdominal location    - Ambulatory referral to Gastroenterology    3. Jaundice    - Ambulatory referral to Gastroenterology    4. Mild persistent asthma without complication    -ACT: 19  -continue with Flovent and Albuterol inhalers      There are no Patient Instructions on file for this visit.  There are no discontinued medications.  Return in about 4 weeks (around 7/19/2019) for annual physical, pap, fasting lab work.    The visit lasted a total of 25 minutes face to face with the patient.  Over 50% of the time spent counseling and educating the patient about above content      Shanice Argueta NP          SUBJECTIVE:  Dayan Madison is a 44 y.o. female who presents for inpatient follow up    Review of hospital documentation: Dayan Madison is a 43 yo female with history of cholecystectomy, SOD s/p ERCP with sphincterotomy who presented to [facility] with intermittent abdominal pain and obstructive jaundice. MRCP demonstrated no intraductal lesion. Abdominal pain quickly resolved, jaundice and LFTs markedly improved suggesting a passed CBD stone. Patient has tolerated 2 regular meals and will be discharged home.     EXAM: MR MRCP W WO CONTRAST  DATE/TIME: 6/16/2019 1:56 PM  1.  Modest focal narrowing at origin of common hepatic duct and confluence of left and right main intrahepatic branches similar to prior studies. Significance of this narrowing is uncertain. No mass or ductal filling defect  "evident.     EXAM: US ABDOMEN LIMITED  DATE/TIME: 6/16/2019 5:29 AM  1.  Status post cholecystectomy.  2.  Common bile duct measures up to 10 mm, likely due to postoperative reservoir effect. No CBD stone was demonstrated.  3.  Normal sonographic appearance of the liver.      Today in clinic: She denies pain anywhere today.  Things have been going well since she has been home.  Is back to work and working the p.m. shift.  She does occasionally experience nausea with high foods.  Otherwise she is tolerating her meals well.  She is extremely fearful of it happening all over again.  She did have 3 ER visits prior to them finding the actual problem, her pain was relieved in the ER after she had received some opiate therapy.  She states that when she was in the ER and felt like was \"reflux on steroids and I had so much epigastric pain it felt like there was a bubble stuck\".  She was told to follow-up with GI specialty, however, she was not given a referral order, I will place that for her today.  Denies fevers, chills, vomiting, urinary changes, constipation or diarrhea. She is overdue for her annual physical with fasting lab work and Pap smear which she will schedule in the next few weeks.        Asthma follow up: Well controlled on the Flovent twice daily and albuterol as needed.  She states \"this time a year is always hard for me with all the pollen\".  ACT score 19, triggers include pollen.  She continues to use her Flovent 2 puffs twice daily and albuterol as needed.  She has had no hospitalizations in the last 12 months in regards to her asthma control.   Chief Complaint   Patient presents with     Hospital Visit Follow Up     [facility] - Abdominal pain and jaundice         Patient Active Problem List   Diagnosis     Eczema of hand     Postsurgical hypothyroidism     Major Depression, Recurrent     Asthma     Obesity (BMI 35.0-39.9 without comorbidity)     Papillary Carcinoma Of The Thyroid Gland     Depression with " anxiety     Epigastric pain     Elevated bilirubin     Elevated alkaline phosphatase level     Elevated LFTs     GERD (gastroesophageal reflux disease)     Abdominal pain     Jaundice       Current Outpatient Medications   Medication Sig Dispense Refill     albuterol (PROAIR HFA;PROVENTIL HFA;VENTOLIN HFA) 90 mcg/actuation inhaler Inhale 2 puffs every 6 (six) hours as needed for wheezing. 1 Inhaler 3     citalopram (CELEXA) 20 MG tablet Take 1 tablet (20 mg total) by mouth daily. 90 tablet 3     famotidine (PEPCID) 20 MG tablet Take 1 tablet (20 mg total) by mouth 2 (two) times a day. 30 tablet 0     fluticasone propionate (FLOVENT HFA) 220 mcg/actuation inhaler Inhale 2 puffs 2 (two) times a day. 1 Inhaler 2     ibuprofen (ADVIL,MOTRIN) 600 MG tablet Take 1 tablet (600 mg total) by mouth every 6 (six) hours as needed for pain. 30 tablet 0     levothyroxine (SYNTHROID, LEVOTHROID) 200 MCG tablet TAKE ONE TABLET BY MOUTH ONCE DAILY AT 6:00 AM. 90 tablet 3     ondansetron (ZOFRAN) 4 MG tablet Take 1 tablet (4 mg total) by mouth every 6 (six) hours. 12 tablet 0     sucralfate (CARAFATE) 1 gram tablet Take 1 g by mouth 2 (two) times a day.       triamcinolone (KENALOG) 0.5 % cream A thin layer to affected area(s) 30 g 2     No current facility-administered medications for this visit.        Social History     Tobacco Use   Smoking Status Never Smoker   Smokeless Tobacco Never Used       REVIEW OF SYSTEMS: Denies  changes in bowel habits, diarrhea, constipation, vomiting, rectal bleeding, melena, new/unusual/ uncooked foods, no sick contacts or recent travel.       TOBACCO USE:  Social History     Tobacco Use   Smoking Status Never Smoker   Smokeless Tobacco Never Used     Social History     Socioeconomic History     Marital status:      Spouse name: Not on file     Number of children: Not on file     Years of education: Not on file     Highest education level: Not on file   Occupational History     Not on file    Social Needs     Financial resource strain: Not on file     Food insecurity:     Worry: Not on file     Inability: Not on file     Transportation needs:     Medical: Not on file     Non-medical: Not on file   Tobacco Use     Smoking status: Never Smoker     Smokeless tobacco: Never Used   Substance and Sexual Activity     Alcohol use: Yes     Frequency: Monthly or less     Drinks per session: 1 or 2     Binge frequency: Never     Drug use: No     Sexual activity: Yes     Partners: Male   Lifestyle     Physical activity:     Days per week: Not on file     Minutes per session: Not on file     Stress: Not on file   Relationships     Social connections:     Talks on phone: Not on file     Gets together: Not on file     Attends Buddhism service: Not on file     Active member of club or organization: Not on file     Attends meetings of clubs or organizations: Not on file     Relationship status: Not on file     Intimate partner violence:     Fear of current or ex partner: Not on file     Emotionally abused: Not on file     Physically abused: Not on file     Forced sexual activity: Not on file   Other Topics Concern     Not on file   Social History Narrative     Not on file         OBJECTIVE:            Vitals:    06/21/19 1023   BP: 100/60   Pulse: 67   Resp: 16   Temp: 97.9  F (36.6  C)   SpO2: 98%     Weight: 205 lb (93 kg)    Wt Readings from Last 3 Encounters:   06/21/19 205 lb (93 kg)   06/16/19 200 lb 11.2 oz (91 kg)   06/15/19 207 lb (93.9 kg)     Body mass index is 35.19 kg/m .        Physical Exam:  GENERAL APPEARANCE: A&A, NAD, well hydrated, well nourished  NECK: Supple, without lymphadenopathy, no thyroid mass, no JVD, no bruit  CV: RRR, no M/G/R   LUNGS: CTAB, normal respiratory effort  ABDOMEN: Soft, tender with palpation in epigastric region, no masses, no organomegaly, BS present x4, right flank pain noted  SKIN:  Normal skin turgor, no lesions/rashes, warm and dry

## 2021-05-29 NOTE — PROGRESS NOTES
Hospital discharge follow up call to pt, no answer.  Left VM message reminding patient of their upcoming appt 6/21/19 at 10:20am.  RN will attempt call back at another time.    Tracy Kingsley RN Care Manager, Population Health

## 2021-05-29 NOTE — TELEPHONE ENCOUNTER
"Pt states Fever x 24 hours.  Current temp \"102.9 (orally) without fever-reducers.\"  Chills -> states \"I'm freezing.\"  Also:  - Headache.  - \"Can't hold my urine.\"  Pt states \"I always have incontinence whenever I get a fever.\"    Due to level of fever, advised same-day clinical eval.  Warm transferred to a  for this purpose now.    aJckie Ingram RN BSBA  [facility] RN Triage     Reason for Disposition    Fever > 100.5 F (38.1 C)    Protocols used: URINARY SYMPTOMS-A-AH      "

## 2021-05-29 NOTE — PROGRESS NOTES
Chief Complaint   Patient presents with     Cough     For over 2 weeks. Making back hurts. Pulled muscle in neck.     Asthma     She thinks that everything might be related to asthma. she states that last night she was gardening and thinks that might have to do with it.        HPI: Patient presents today with complaints of a dry tight cough over the last 2 weeks progressively getting worse.  She has known history of asthma and has been taking a Flovent inhaler only once a day, and upon further questioning it turns out the inhalers actually from 2015.  She does also have an as needed albuterol inhaler on hand but she does not like how that makes her feel jittery.  Cough is primarily not productive.  No sick contacts.  She wonders if it may be related to environmental exposure to gardening because that one symptoms started to flareup.  No significant eye or ear issues.  No fever or chills.    Patient requests a refill of her steroid cream for her eczema.  Primarily it occurs along her wrists and she uses it sparingly.    Patient had a history of an abnormal Pap in 2014 showing atypical cells of undetermined significance with negative HPV.  No repeat Pap since then.    Patient is a history of depression currently treated with citalopram.  It is going well.  No changes needed.  No significant side effects.  No suicidal/homicidal ideation.    PHQ-9 Total Score: 3 (5/23/2019  3:00 PM)  ROSITA 7 Total Score: 0 (5/23/2019  3:00 PM)    Patient is due for her mammogram.  She is hesitant to go forward.      ROS:Review of Systems - History obtained from the patient  General ROS: negative  Psychological ROS: negative  Ophthalmic ROS: negative  ENT ROS: negative  Allergy and Immunology ROS: positive for - seasonal allergies  Hematological and Lymphatic ROS: negative  Respiratory ROS: positive for - cough and wheezing  Cardiovascular ROS: negative  Dermatological ROS: positive for eczema    SH: The Patient's  reports that she has  "never smoked. She has never used smokeless tobacco. She reports that she does not use drugs.      FH: The Patient's family history includes Heart disease in her maternal grandmother.     Meds:    Current Outpatient Medications on File Prior to Visit   Medication Sig Dispense Refill     citalopram (CELEXA) 20 MG tablet Take 1 tablet (20 mg total) by mouth daily. 90 tablet 3     fluticasone propionate (FLOVENT HFA) 110 mcg/actuation inhaler Inhale 1 puff 2 (two) times a day.       levothyroxine (SYNTHROID, LEVOTHROID) 200 MCG tablet TAKE ONE TABLET BY MOUTH ONCE DAILY AT 6:00 AM. 90 tablet 3     ibuprofen (ADVIL,MOTRIN) 600 MG tablet Take 1 tablet (600 mg total) by mouth every 6 (six) hours as needed for pain. 30 tablet 0     ondansetron (ZOFRAN) 4 MG tablet Take 1 tablet (4 mg total) by mouth every 6 (six) hours. 12 tablet 0     No current facility-administered medications on file prior to visit.        O:  /88   Pulse 66   Temp 98.2  F (36.8  C) (Oral)   Ht 5' 3\" (1.6 m)   Wt 208 lb (94.3 kg)   LMP 05/13/2019 (Approximate)   SpO2 97%   BMI 36.85 kg/m      Physical Examination:   General appearance - alert, well appearing, and in no distress  Mental status - alert, oriented to person, place, and time  Eyes - pupils equal and reactive, extraocular eye movements intact  Ears - bilateral TM's and external ear canals normal  Nose - normal and patent, no erythema, discharge or polyps  Mouth - mucous membranes moist, pharynx normal without lesions  Neck - supple, no significant adenopathy  Lymphatics - no palpable lymphadenopathy, no hepatosplenomegaly  Chest -expiratory wheezes throughout.  No crackles  Heart - normal rate and regular rhythm, S1 and S2 normal, no murmurs noted  Extremities - peripheral pulses normal, no pedal edema, no clubbing or cyanosis  Skin -little bit of eczema on the dorsal side of the left wrist.      A/P:     Problem List Items Addressed This Visit        ENT/CARD/PULM/ENDO Problems "    Asthma    Relevant Medications    fluticasone propionate (FLOVENT HFA) 110 mcg/actuation inhaler    albuterol (PROAIR HFA;PROVENTIL HFA;VENTOLIN HFA) 90 mcg/actuation inhaler    fluticasone propionate (FLOVENT HFA) 220 mcg/actuation inhaler       Other    Eczema of hand - Primary    Relevant Medications    triamcinolone (KENALOG) 0.5 % cream    Major Depression, Recurrent    Obesity (BMI 35.0-39.9 without comorbidity)      Other Visit Diagnoses     Papanicolaou smear of cervix with atypical squamous cells of undetermined significance (ASC-US)        Encounter for screening mammogram for breast cancer        Relevant Orders    Mammo Screening Bilateral            1. Mild intermittent asthma with acute exacerbation  Discussed that her Flovent inhaler is quite  and probably ineffective, along with the fact that typical dosing is twice a day.  New prescription sent to the pharmacy along with a refill of albuterol.  She will let me know next week if she is still symptomatic.    - albuterol (PROAIR HFA;PROVENTIL HFA;VENTOLIN HFA) 90 mcg/actuation inhaler; Inhale 2 puffs every 6 (six) hours as needed for wheezing.  Dispense: 1 Inhaler; Refill: 3  - fluticasone propionate (FLOVENT HFA) 220 mcg/actuation inhaler; Inhale 2 puffs 2 (two) times a day.  Dispense: 1 Inhaler; Refill: 2    2. Papanicolaou smear of cervix with atypical squamous cells of undetermined significance (ASC-US)  Informed patient that she should have a repeat Pap smear.  She will get this done at a physical later this year.    3. Eczema of hand  Refill sent to the pharmacy.  - triamcinolone (KENALOG) 0.5 % cream; A thin layer to affected area(s)  Dispense: 30 g; Refill: 2    4. Mild episode of recurrent major depressive disorder (H)  Well-controlled.    5. Encounter for screening mammogram for breast cancer  Encouraged mammography.  - Mammo Screening Bilateral; Future        Tera Titus, CNP

## 2021-05-29 NOTE — PROGRESS NOTES
TCM DISCHARGE FOLLOW UP CALL    Discharge Date:  6/17/2019  Reason for hospital stay (discharge diagnosis)::  Obstructive jaundice, Choledocholithiasis, GERD  Are you feeling better, the same or worse since your discharge?:  Patient is feeling better (No recurrent abdominal pain, went back to work yesterday.  Eating/drinking.  Having BM's)  Do you feel like you have a plan in the event of a health emergency?: Yes (Call , call 911)    As part of your discharge plan, were  home care services ordered for you?: No    Did you receive any new medications, or was there a change to your medications?: No    Do you have any follow up visits scheduled with your PCP or Specialist?:  Yes, with PCP (Shanice Argueta CNP 6/21/19)  (RN) Is PCP appt scheduled soon enough (within 14 days of discharge date)?: Yes        Tracy Kingsley RN Care Manager, Population Health

## 2021-05-29 NOTE — PATIENT INSTRUCTIONS - HE
I've sent a refill of the flovent at a higher dose to the pharmacy. 2 puffs twice a day. It only works if taken regularly.  Rinse and spit afterwards.    Let me know next week if still having the tight cough and we can try an oral steroid.    You'll get a call later this summer about coming in for a physical with pap smear.    Thank you for coming in today!    If you receive a survey from [email or web address] about your experience today, it would be very helpful if you could fill it out to let us know what went well and what we can improve!    General Information:    Today you had your appointment with Tera Titus NP    My hours are:    Monday : Out of clinic  Tuesday : 8:00AM - 5:00 PM  Wednesday: 8:00AM - 5:00 PM  Thursday: 8:00AM - 5:00 PM  Friday: 8:00AM - 5:00 PM    I am not in the office Mondays. Therefore non-urgent calls and medical messages received on Monday will be addressed when I am back in the office on Tuesday. Urgent matters will be reviewed and addressed by one of my partners in the office as needed.    If lab work was done today as part of your evaluation you will generally be contacted via [facility], mail, or phone with the results within 1-5 days. If there is an alarming result we will contact you by phone. Lab results come back at varying times, I generally wait until all lab results are available before making comments on the results.     If you need refills please contact your pharmacy. They will send a refill request to me to review. Please allow 3-5 business days for us to process all refill requests.     My Clinical Assistant is Luz Maria. Please call us at [phone] or send a medical message with any questions or concerns.

## 2021-05-29 NOTE — PROGRESS NOTES
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  She is felt poorly, has had nausea and is not been having good oral intake.  This is in the context of patient having had a cholecystectomy and sphincter of Oddi dysfunction.  Unable to give a urine sample.    ROS: No vomiting.  No melena hematochezia.  No dysuria.  She has had urinary incontinence however.  10 point system review negative    SH:    reports that she has never smoked. She has never used smokeless tobacco. She reports that she does not use drugs.      FH: The Patient's family history includes Heart disease in her maternal grandmother.     Meds:  Dayan has a current medication list which includes the following prescription(s): albuterol, citalopram, fluticasone propionate, fluticasone propionate, ibuprofen, levothyroxine, methylprednisolone, ondansetron, and triamcinolone.    O:  /84   Pulse (!) 116   Temp (!) 102.2  F (39  C)   Resp 18   Wt 206 lb (93.4 kg)   LMP 05/13/2019 (Approximate)   SpO2 98%   BMI 36.49 kg/m    No acute distress but appears ill   Constitutional:    --Vitals as above  --No acute distress  Eyes-  --Sclera noninjected  --Lids and conjunctiva normal  ENT-  --TMs clear  --Sclera noninjected  --Pharynx not erythematous but mucous membranes mildly dry  Neck-  --Neck supple    Lymph-  --No cervical lymphadenopathy  Lungs-  --Clear to Auscultation  Heart-  --Regular rate and rhythm  Skin-  --Pink and dry  Abdomen-  --Tender to palpation of the midepigastrium        A/P:   1. Urine incontinence  - Urinalysis Macroscopic    2. Acute pancreatitis, unspecified complication status, unspecified pancreatitis type  Patient has signs of acute pancreatitis with history of sphincter of Oddi dysfunction.   Recommend immediate departure to the emergency department at [facility].  Offered ambulance patient declined.

## 2021-05-30 VITALS — HEIGHT: 64 IN | BODY MASS INDEX: 36.02 KG/M2 | WEIGHT: 211 LBS

## 2021-05-31 VITALS — WEIGHT: 204 LBS | HEIGHT: 64 IN | BODY MASS INDEX: 34.83 KG/M2

## 2021-05-31 VITALS — WEIGHT: 202 LBS | HEIGHT: 64 IN | BODY MASS INDEX: 34.49 KG/M2

## 2021-05-31 VITALS — WEIGHT: 207.3 LBS | HEIGHT: 64 IN | BODY MASS INDEX: 35.39 KG/M2

## 2021-05-31 NOTE — TELEPHONE ENCOUNTER
Mychart message disregarded and patient contacted by phone. Not [place] patient. She has been scheduled with Shanice 8/13/19

## 2021-05-31 NOTE — ANESTHESIA PREPROCEDURE EVALUATION
Anesthesia Evaluation      Patient summary reviewed   No history of anesthetic complications     Airway   Mallampati: II  Neck ROM: full   Pulmonary - normal exam    breath sounds clear to auscultation  (+) asthma                           Cardiovascular - negative ROS  Exercise tolerance: > or = 4 METS  (-) murmur  Rhythm: regular  Rate: normal,    no murmur      Neuro/Psych    (+) depression, anxiety/panic attacks,     Endo/Other    (+) hypothyroidism, obesity,   (-) not pregnant     GI/Hepatic/Renal    (+) GERD,        Other findings: Labs 8/13/19:  Hgb 13.0  Na 142, K 4.0, BUN 14, Cr 0.66      Dental - normal exam                        Anesthesia Plan  Planned anesthetic: general endotracheal  GETA.  High risk for PONV and plan for scopolamine patch, dexamethasone, zofran and low-dose propofol gtt (25-50 mcg/kg/min).    ASA 3   Induction: intravenous   Anesthetic plan and risks discussed with: patient  Anesthesia plan special considerations: antiemetics,   Post-op plan: routine recovery

## 2021-05-31 NOTE — ANESTHESIA POSTPROCEDURE EVALUATION
Patient: Dayan ROMERO Terpatricia  ENDOSCOPIC ULTRASOUND, ENDOSCOPIC RETROGRADE CHOLANGIOPANCREATOGRAPHY bilinary  spinctomy  Anesthesia type: general    Patient location: PACU  Last vitals:   Vitals Value Taken Time   /67 8/16/2019  3:30 PM   Temp 98.4 8/16/2019  3:33 PM   Pulse 79 8/16/2019  3:32 PM   Resp 29 8/16/2019  3:32 PM   SpO2 100 % 8/16/2019  3:32 PM   Vitals shown include unvalidated device data.  Post vital signs: stable  Level of consciousness: awake and responds to simple questions  Post-anesthesia pain: pain controlled  Post-anesthesia nausea and vomiting: no  Pulmonary: unassisted, return to baseline  Cardiovascular: stable and blood pressure at baseline  Hydration: adequate  Anesthetic events: no    QCDR Measures:  ASA# 11 - Misa-op Cardiac Arrest: ASA11B - Patient did NOT experience unanticipated cardiac arrest  ASA# 12 - Misa-op Mortality Rate: ASA12B - Patient did NOT die  ASA# 13 - PACU Re-Intubation Rate: ASA13B - Patient did NOT require a new airway mgmt  ASA# 10 - Composite Anes Safety: ASA10A - No serious adverse event    Additional Notes:

## 2021-05-31 NOTE — ANESTHESIA CARE TRANSFER NOTE
Last vitals:   Vitals:    08/16/19 1204   BP: 124/88   Pulse: 77   Resp: 20   Temp: 36.6  C (97.8  F)   SpO2: 95%     Patient's level of consciousness is drowsy  Spontaneous respirations: yes  Maintains airway independently: yes  Dentition unchanged: yes  Oropharynx: oropharynx clear of all foreign objects    QCDR Measures:  ASA# 20 - Surgical Safety Checklist: WHO surgical safety checklist completed prior to induction    PQRS# 430 - Adult PONV Prevention: 4558F - Pt received => 2 anti-emetic agents (different classes) preop & intraop  ASA# 8 - Peds PONV Prevention: NA - Not pediatric patient, not GA or 2 or more risk factors NOT present  PQRS# 424 - Misa-op Temp Management: 4559F - At least one body temp DOCUMENTED => 35.5C or 95.9F within required timeframe  PQRS# 426 - PACU Transfer Protocol: - Transfer of care checklist used  ASA# 14 - Acute Post-op Pain: ASA14B - Patient did NOT experience pain >= 7 out of 10

## 2021-05-31 NOTE — TELEPHONE ENCOUNTER
Received rx refill request from [facility] for levothyroxine 200 mcg.     Please call pt to inform of Dr. King's status and MAKSIM for further med refill.

## 2021-05-31 NOTE — PROGRESS NOTES
Preoperative Exam    Scheduled Procedure: Endoscopic US  Surgery Date:  08/16/19  Surgery Location: [facility], fax [phone]    Surgeon:  Dr. OSCAR Krishnamurthy    Assessment/Plan:     1. Encounter for preoperative examination for general surgical procedure  Basic Metabolic Panel    Hemoglobin    Pregnancy (Beta-hCG, Qual), Urine   2. Elevated LFTs  Basic Metabolic Panel    Hemoglobin    Pregnancy (Beta-hCG, Qual), Urine   3. Elevated alkaline phosphatase level  Basic Metabolic Panel    Hemoglobin    Pregnancy (Beta-hCG, Qual), Urine   4. Elevated bilirubin  Basic Metabolic Panel    Hemoglobin    Pregnancy (Beta-hCG, Qual), Urine   5. Epigastric pain  Basic Metabolic Panel    Hemoglobin    Pregnancy (Beta-hCG, Qual), Urine   6. Sphincter of Oddi dysfunction  Basic Metabolic Panel    Hemoglobin    Pregnancy (Beta-hCG, Qual), Urine   7. Skin tag     8. Obesity (BMI 35.0-39.9) with comorbidity (H)       EKG on file from June 2019 hospitalization, see cardiology tab      1. Encounter for preoperative examination for general surgical procedure    - Basic Metabolic Panel  - Hemoglobin  - Pregnancy (Beta-hCG, Qual), Urine    2. Elevated LFTs    - Basic Metabolic Panel  - Hemoglobin  - Pregnancy (Beta-hCG, Qual), Urine    3. Elevated alkaline phosphatase level    - Basic Metabolic Panel  - Hemoglobin  - Pregnancy (Beta-hCG, Qual), Urine    4. Elevated bilirubin    - Basic Metabolic Panel  - Hemoglobin  - Pregnancy (Beta-hCG, Qual), Urine    5. Epigastric pain    - Basic Metabolic Panel  - Hemoglobin  - Pregnancy (Beta-hCG, Qual), Urine    6. Sphincter of Oddi dysfunction    - Basic Metabolic Panel  - Hemoglobin  - Pregnancy (Beta-hCG, Qual), Urine    7. Skin tag    -removed using surgical forceps and scissors.  Site was bleeding prior to removal.  Cauterization performed up to 10 times until bleeding was stopped.  Patient tolerated the procedure well.  Offered lidocaine injection during procedure, patient declined.  Site  was covered post procedure, no bleeding noted prior to covering area.          Surgical Procedure Risk: Low (reported cardiac risk generally < 1%)  Have you had prior anesthesia?: Yes  Have you or any family members had a previous anesthesia reaction:  No  Do you or any family members have a history of a clotting or bleeding disorder?: No  Cardiac Risk Assessment: no increased risk for major cardiac complications    APPROVAL GIVEN to proceed with proposed procedure, without further diagnostic evaluation    Please Note:  None    Functional Status: Independent  Patient plans to recover at home with family.     Subjective:      Dayan Madison is a 44 y.o. female who presents for a preoperative consultation.  Patient was evaluated by GI specialty July 15 for her ongoing epigastric abdominal pain.  She did see Dr. Hernandez during her hospitalization and was noted to have significant elevation in her liver labs with , , total bilirubin 6.3 and alkaline phosphatase 360.  Patient felt better after admission and receiving morphine.  She did undergo MRCP which was completed on Maryam 15.  There was no notable narrowing at the origin of the common hepatic duct, her liver and lab subsequently improved the following day on June 17.  It was felt that her symptoms were most likely due to formation of a de marilyn common bile duct stone which sub-subsequently passed.      Today in clinic, she denies any return of symptoms, they are going to be checking her for a possible stricture on the 16th during her procedure.     She reports having a very large skin tag on her back that she would like removed today.  She first noticed the skin taken May but it has grown significantly in size and started bleeding several weeks ago.  The skin tag has been continuously bleeding, she has been keeping the site covered.  The area only bothers her when close or her bra or rub up against it.  She is rating the pain today a 1 out of 10.    All  other systems reviewed and are negative, other than those listed in the HPI.    Pertinent History  Do you have difficulty breathing or chest pain after walking up a flight of stairs: No  History of obstructive sleep apnea: No  Steroid use in the last 6 months: no  Frequent Aspirin/NSAID use: Yes: Ibu[rofren  Prior Blood Transfusion: No  Prior Blood Transfusion Reaction: No  If for some reason prior to, during or after the procedure, if it is medically indicated, would you be willing to have a blood transfusion?:  There is no transfusion refusal.    Current Outpatient Medications   Medication Sig Dispense Refill     albuterol (PROAIR HFA;PROVENTIL HFA;VENTOLIN HFA) 90 mcg/actuation inhaler Inhale 2 puffs every 6 (six) hours as needed for wheezing. 1 Inhaler 3     citalopram (CELEXA) 20 MG tablet Take 1 tablet (20 mg total) by mouth daily. 90 tablet 3     fluticasone propionate (FLOVENT HFA) 220 mcg/actuation inhaler Inhale 2 puffs 2 (two) times a day. 1 Inhaler 2     ibuprofen (ADVIL,MOTRIN) 600 MG tablet Take 1 tablet (600 mg total) by mouth every 6 (six) hours as needed for pain. 30 tablet 0     levothyroxine (SYNTHROID, LEVOTHROID) 200 MCG tablet TAKE ONE TABLET BY MOUTH ONCE DAILY AT 6:00 AM. 60 tablet 0     OMEPRAZOLE ORAL Take by mouth.       triamcinolone (KENALOG) 0.5 % cream A thin layer to affected area(s) 30 g 2     famotidine (PEPCID) 20 MG tablet Take 1 tablet (20 mg total) by mouth 2 (two) times a day. 30 tablet 0     ondansetron (ZOFRAN) 4 MG tablet Take 1 tablet (4 mg total) by mouth every 6 (six) hours. 12 tablet 0     No current facility-administered medications for this visit.         No Known Allergies    Patient Active Problem List   Diagnosis     Eczema of hand     Postsurgical hypothyroidism     Major Depression, Recurrent     Asthma     Obesity (BMI 35.0-39.9 without comorbidity)     Papillary Carcinoma Of The Thyroid Gland     Depression with anxiety     Epigastric pain     Elevated bilirubin      Elevated alkaline phosphatase level     Elevated LFTs     GERD (gastroesophageal reflux disease)     Abdominal pain     Jaundice     Obesity (BMI 35.0-39.9) with comorbidity (H)     Sphincter of Oddi dysfunction     Skin tag       Past Medical History:   Diagnosis Date     Anxiety      Asthma      Depression      Disease of thyroid gland      Eczema      Obese      Sphincter of Oddi dysfunction      Thyroid cancer (H) 2000       Past Surgical History:   Procedure Laterality Date     TN REMOVAL GALLBLADDER      Description: Cholecystectomy;  Recorded: 03/22/2010;  Comments: 2000     TN THYROIDECTOMY  2005    Thyroid Surgery Total Thyroidectomy;  Papillary thyroid cancer       Social History     Socioeconomic History     Marital status:      Spouse name: Not on file     Number of children: Not on file     Years of education: Not on file     Highest education level: Not on file   Occupational History     Not on file   Social Needs     Financial resource strain: Not on file     Food insecurity:     Worry: Not on file     Inability: Not on file     Transportation needs:     Medical: Not on file     Non-medical: Not on file   Tobacco Use     Smoking status: Never Smoker     Smokeless tobacco: Never Used   Substance and Sexual Activity     Alcohol use: Yes     Frequency: Monthly or less     Drinks per session: 1 or 2     Binge frequency: Never     Drug use: No     Sexual activity: Yes     Partners: Male   Lifestyle     Physical activity:     Days per week: Not on file     Minutes per session: Not on file     Stress: Not on file   Relationships     Social connections:     Talks on phone: Not on file     Gets together: Not on file     Attends Yazdanism service: Not on file     Active member of club or organization: Not on file     Attends meetings of clubs or organizations: Not on file     Relationship status: Not on file     Intimate partner violence:     Fear of current or ex partner: Not on file     Emotionally  "abused: Not on file     Physically abused: Not on file     Forced sexual activity: Not on file   Other Topics Concern     Not on file   Social History Narrative     Not on file       Patient Care Team:  Alexia Don MD as PCP - General          Objective:     Vitals:    08/13/19 1328   BP: 120/80   Pulse: 75   Resp: 16   Temp: 97.9  F (36.6  C)   SpO2: 97%   Weight: 210 lb (95.3 kg)   Height: 5' 4\" (1.626 m)   PainSc: 0-No pain   LMP: 07/15/2019         Physical Exam:    Review of Systems     Denies fever, chills, visual changes, fatigue, myalgias, nasal congestion, rhinorrhea, ear pain, or discharge, sore throat, swollen glands, breast mass, nipple discharge, breast changes, abdominal pain, cough, shortness of breath, chest pain, weight change, change in bowel habits, melena, rectal bleeding, dysuria, frequency, urgency, hematuria, polyuria, polydipsia, polyphagia, joint pain or swelling or erythema, edema, rash, weakness, paresthesias, vaginal discharge or bleeding or mood changes.     Positive: Skin tag with bleeding on back  Objective:         /80   Pulse 75   Temp 97.9  F (36.6  C)   Resp 16   Ht 5' 4\" (1.626 m)   Wt 210 lb (95.3 kg)   LMP 07/15/2019 (Approximate)   SpO2 97%   Breastfeeding? No   BMI 36.05 kg/m       Physical Exam:  General Appearance: Alert, cooperative, no distress, appears stated age  Head: Normocephalic, without obvious abnormality, atraumatic  Eyes: PERRL, conjunctiva/corneas clear, EOM's intact  Ears: Normal TM's and external ear canals, both ears  Nose: Nares normal, septum midline,mucosa normal, no drainage  Throat: Lips, mucosa, and tongue normal; teeth and gums normal, uvula midline  Neck: Supple, symmetrical, trachea midline, no adenopathy;  thyroid: not enlarged, symmetric, no tenderness/mass/nodules; no carotid bruit or JVD  Back: Symmetric, no curvature, ROM normal, no CVA tenderness  Lungs: Clear to auscultation bilaterally, respirations unlabored  Heart: " Regular rate and rhythm, S1 and S2 normal, no murmur, rub, or gallop. No edema. Bilateral femoral, pedal and post-tibial pulses are equal and palpable, 2+  Abdomen: Soft, non-tender, bowel sounds active all four quadrants,  no masses, no organomegaly  Extremities: Extremities normal, atraumatic, no cyanosis or edema  Skin: Skin color, texture, turgor normal, no rashes.  Large skin tag noted with bleeding just above the bra line midline back.  Lymph nodes: Cervical, supraclavicular, and axillary nodes normal  Neurologic: Normal, DTRs intact, equal  strength, face symmetrical, no gross deficits             Patient Instructions     Hold all supplements, aspirin and NSAIDs for 7 days prior to surgery. NPO after midnight.  Follow your surgeon's direction on when to stop eating and drinking prior to surgery. Remove all jewelry and metal piercings before your surgery.           Labs:  Recent Results (from the past 240 hour(s))   Basic Metabolic Panel    Collection Time: 08/13/19  1:51 PM   Result Value Ref Range    Sodium 142 136 - 145 mmol/L    Potassium 4.0 3.5 - 5.0 mmol/L    Chloride 108 (H) 98 - 107 mmol/L    CO2 26 22 - 31 mmol/L    Anion Gap, Calculation 8 5 - 18 mmol/L    Glucose 89 70 - 125 mg/dL    Calcium 9.9 8.5 - 10.5 mg/dL    BUN 14 8 - 22 mg/dL    Creatinine 0.66 0.60 - 1.10 mg/dL    GFR MDRD Af Amer >60 >60 mL/min/1.73m2    GFR MDRD Non Af Amer >60 >60 mL/min/1.73m2   Hemoglobin    Collection Time: 08/13/19  1:51 PM   Result Value Ref Range    Hemoglobin 13.0 12.0 - 16.0 g/dL   Pregnancy (Beta-hCG, Qual), Urine    Collection Time: 08/13/19  1:51 PM   Result Value Ref Range    Pregnancy Test, Urine Negative Negative         There is no immunization history on file for this patient.        Electronically signed by Shanice Argueta NP 08/13/19 1:20 PM

## 2021-05-31 NOTE — PATIENT INSTRUCTIONS - HE
Hold all supplements, aspirin and NSAIDs for 7 days prior to surgery. NPO after midnight.  Follow your surgeon's direction on when to stop eating and drinking prior to surgery. Remove all jewelry and metal piercings before your surgery.

## 2021-05-31 NOTE — TELEPHONE ENCOUNTER
Endo Team    Previous pt of dr soliz's. Thyroid cancer.    Gogo, Are you able to see this one? She can be reached @ [phone]

## 2021-06-01 ENCOUNTER — OFFICE VISIT - HEALTHEAST (OUTPATIENT)
Dept: FAMILY MEDICINE | Facility: CLINIC | Age: 46
End: 2021-06-01

## 2021-06-01 VITALS — HEIGHT: 64 IN | WEIGHT: 204 LBS | BODY MASS INDEX: 34.83 KG/M2

## 2021-06-01 DIAGNOSIS — R06.02 SOB (SHORTNESS OF BREATH): ICD-10-CM

## 2021-06-02 ENCOUNTER — RECORDS - HEALTHEAST (OUTPATIENT)
Dept: ADMINISTRATIVE | Facility: CLINIC | Age: 46
End: 2021-06-02

## 2021-06-02 VITALS — WEIGHT: 210.1 LBS | BODY MASS INDEX: 35.87 KG/M2 | HEIGHT: 64 IN

## 2021-06-02 NOTE — PROGRESS NOTES
ASSESSMENT/PLAN:       1. Postsurgical hypothyroidism    - Thyroid Stimulating Hormone (TSH)  - Thyroglobulin, Serum or Plasma with Reflex to LC-MS/MS or LYNN  - T3 (Triiodthyronine), Free  - T4, Free  - levothyroxine (SYNTHROID, LEVOTHROID) 200 MCG tablet; TAKE ONE TABLET BY MOUTH ONCE DAILY AT 6:00 AM.  Dispense: 90 tablet; Refill: 3  [employer] message with test results    2. Papillary Carcinoma Of The Thyroid Gland    - Thyroid Stimulating Hormone (TSH)  - Thyroglobulin, Serum or Plasma with Reflex to LC-MS/MS or LYNN  - T3 (Triiodthyronine), Free  - T4, Free  - levothyroxine (SYNTHROID, LEVOTHROID) 200 MCG tablet; TAKE ONE TABLET BY MOUTH ONCE DAILY AT 6:00 AM.  Dispense: 90 tablet; Refill: 3    The patient will need to follow-up with Dr. Don for a pelvic exam and Pap smear in the near future.  25 minutes spent total with the patient face-to-face with greater than 50% of the time being spent in regard to the above problems and plan of care    The following are part of a depression follow up plan for the patient:  patient follow-up to return when and if necessary    Isael Gonsalez MD      PROGRESS NOTE   10/16/2019    SUBJECTIVE:  Dayan Madison is a 44 y.o. female  who presents for   Chief Complaint   Patient presents with     Follow-up     Medications Check     1. Postsurgical hypothyroidism  The patient had thyroid surgery and 2002 for a papillary carcinoma.  The patient typically has been followed by endocrinology at [facility] but has not been able to gain access to their group.  She is here today because she is running out of her thyroid replacement medication and is been over a year since she has had blood work done.  The goal is to keep her TSH suppressed.  She otherwise feels well without any symptoms of hyperthyroidism.  She also has not noticed any lumps in her neck.      2. Papillary Carcinoma Of The Thyroid Gland  Thyroid cancer was stage I and she underwent a total thyroidectomy.  The cancer was  1.9 cm in on the right side she also had radioactive iodine ablation    Patient Active Problem List   Diagnosis     Eczema of hand     Postsurgical hypothyroidism     Major Depression, Recurrent     Asthma     Obesity (BMI 35.0-39.9 without comorbidity)     Papillary Carcinoma Of The Thyroid Gland     Depression with anxiety     Epigastric pain     Elevated bilirubin     Elevated alkaline phosphatase level     Elevated LFTs     GERD (gastroesophageal reflux disease)     Abdominal pain     Jaundice     Obesity (BMI 35.0-39.9) with comorbidity (H)     Sphincter of Oddi dysfunction     Skin tag       Current Outpatient Medications   Medication Sig Dispense Refill     albuterol (PROAIR HFA;PROVENTIL HFA;VENTOLIN HFA) 90 mcg/actuation inhaler Inhale 2 puffs every 6 (six) hours as needed for wheezing. 1 Inhaler 3     citalopram (CELEXA) 20 MG tablet Take 1 tablet (20 mg total) by mouth daily. 90 tablet 3     fluticasone propionate (FLOVENT HFA) 220 mcg/actuation inhaler Inhale 2 puffs 2 (two) times a day. (Patient taking differently: Inhale 2 puffs 2 (two) times a day as needed.       ) 1 Inhaler 2     levothyroxine (SYNTHROID, LEVOTHROID) 200 MCG tablet TAKE ONE TABLET BY MOUTH ONCE DAILY AT 6:00 AM. 90 tablet 3     naproxen sodium (ALEVE) 220 MG tablet Take 220 mg by mouth every 12 (twelve) hours as needed for pain.       omeprazole (PRILOSEC) 20 MG capsule Take 20 mg by mouth daily as needed.       triamcinolone (KENALOG) 0.5 % cream A thin layer to affected area(s) (Patient taking differently: Apply topically 2 (two) times a day as needed. A thin layer to affected area(s)      ) 30 g 2     No current facility-administered medications for this visit.        Social History     Tobacco Use   Smoking Status Never Smoker   Smokeless Tobacco Never Used           OBJECTIVE:        No results found for this or any previous visit (from the past 240 hour(s)).    Vitals:    10/16/19 1135   BP: 130/65   Pulse: 66   SpO2: 98%    Weight: 210 lb 12.8 oz (95.6 kg)     Weight: 210 lb 12.8 oz (95.6 kg)          Physical Exam:  GENERAL APPEARANCE: Very pleasant 44-year-old female, NAD, well hydrated, well nourished  SKIN:  Normal skin turgor, no lesions/rashes   HEENT: moist mucous membranes, no rhinorrhea  NECK: Normal without adenopathy or masses, particular attention to the anterior neck in the region of the previous thyroid gland.  CV: RRR, no M/G/R   LUNGS: CTAB  EXTREMITY: no edema and full ROM of all joints  NEURO: no focal findings

## 2021-06-02 NOTE — PROGRESS NOTES
Dayan,    The tests are back in regard to your thyroid cancer follow-up blood work.  The thyroglobulin antibody results are undetectable as they have been in the past and also the thyroglobulin level is low.  This is reassuring that there is no recurrence of the thyroid cancer.  The other thyroid tests show that your TSH is suppressed as we wanted to be and that your free T4 and T3 are not in the hyperthyroid range.  Thus I do not feel that we need to make any changes in your thyroid replacement therapy.  Please let me know if you have other questions.  Dr. Gonsalez

## 2021-06-02 NOTE — TELEPHONE ENCOUNTER
Refill Request  Did you contact pharmacy: No  Medication name:   Requested Prescriptions     Pending Prescriptions Disp Refills     levothyroxine (SYNTHROID, LEVOTHROID) 200 MCG tablet 60 tablet 0     Sig: TAKE ONE TABLET BY MOUTH ONCE DAILY AT 6:00 AM.     Who prescribed the medication: Dr King  Pharmacy Name and Location: [facility] # [id number]  Is patient out of medication: Yes  Patient notified refills processed in 72 hours:  yes  Okay to leave a detailed message: yes    Patient needs refill, has made an appointment for the clinic for medication check, had been going to endocrinoloy, however that  is no longer there. Will come to PCP clinic for management  And find new Endocrine Dr.

## 2021-06-03 VITALS
DIASTOLIC BLOOD PRESSURE: 65 MMHG | SYSTOLIC BLOOD PRESSURE: 130 MMHG | WEIGHT: 210.8 LBS | OXYGEN SATURATION: 98 % | HEART RATE: 66 BPM | BODY MASS INDEX: 36.18 KG/M2

## 2021-06-03 VITALS — WEIGHT: 210 LBS | BODY MASS INDEX: 36.05 KG/M2

## 2021-06-03 VITALS — WEIGHT: 205 LBS | BODY MASS INDEX: 35 KG/M2 | HEIGHT: 64 IN

## 2021-06-03 VITALS — BODY MASS INDEX: 35.85 KG/M2 | HEIGHT: 64 IN | WEIGHT: 210 LBS

## 2021-06-03 VITALS — WEIGHT: 206 LBS | BODY MASS INDEX: 36.49 KG/M2

## 2021-06-03 VITALS — BODY MASS INDEX: 36.86 KG/M2 | WEIGHT: 208 LBS | HEIGHT: 63 IN

## 2021-06-03 NOTE — TELEPHONE ENCOUNTER
Refill Approved    Rx renewed per Medication Renewal Policy. Medication was last renewed on 9/19/18.    [place], [facility] Connection Triage/Med Refill 12/3/2019     Requested Prescriptions   Pending Prescriptions Disp Refills     citalopram (CELEXA) 20 MG tablet 90 tablet 3     Sig: Take 1 tablet (20 mg total) by mouth daily.       SSRI Refill Protocol  Passed - 12/3/2019  7:42 AM        Passed - PCP or prescribing provider visit in last year     Last office visit with prescriber/PCP: 2/13/2017 Alexia Don MD OR same dept: 10/16/2019 Isael Gonsalez MD OR same specialty: 10/16/2019 Isael Gonsalez MD  Last physical: Visit date not found Last MTM visit: Visit date not found   Next visit within 3 mo: Visit date not found  Next physical within 3 mo: Visit date not found  Prescriber OR PCP: Alexia Don MD  Last diagnosis associated with med order: 1. Depression with anxiety  - citalopram (CELEXA) 20 MG tablet; Take 1 tablet (20 mg total) by mouth daily.  Dispense: 90 tablet; Refill: 3    If protocol passes may refill for 12 months if within 3 months of last provider visit (or a total of 15 months).

## 2021-06-04 VITALS
OXYGEN SATURATION: 99 % | BODY MASS INDEX: 37.1 KG/M2 | HEIGHT: 64 IN | DIASTOLIC BLOOD PRESSURE: 88 MMHG | WEIGHT: 217.31 LBS | SYSTOLIC BLOOD PRESSURE: 130 MMHG | HEART RATE: 79 BPM

## 2021-06-04 NOTE — TELEPHONE ENCOUNTER
I called pt to update ACT. Score was 16 but now that the ground has frozen last 2 weeks she has been good.    Pt also requesting script for omeprazole. She has been using OTC for a long time but would like to try & get the filled through ins. Pt uses [facility] in [place].    Last OV 10/26/19 with Dr. Gonsalez. Plan was to f/u in 6 months 4/16/20 for phy.

## 2021-06-04 NOTE — TELEPHONE ENCOUNTER
Refill Approved    Rx renewed per Medication Renewal Policy. Medication was last renewed on 12/9/19.    [facility]mani Robin, [facility] Connection Triage/Med Refill 1/4/2020     Requested Prescriptions   Pending Prescriptions Disp Refills     fluticasone propionate (FLOVENT HFA) 220 mcg/actuation inhaler 3 Inhaler 0     Sig: Inhale 2 puffs 2 (two) times a day.       Asthma Medications Refill Protocol Passed - 1/4/2020  1:55 PM        Passed - PCP or prescribing provider visit in last year     Last office visit with prescriber/PCP: 2/13/2017 Alexia Don MD OR same dept: 10/16/2019 Isael Gonsalez MD OR same specialty: 10/16/2019 Isael Gonsalez MD  Last physical: Visit date not found Last MTM visit: Visit date not found    Next appt within 3 mo: Visit date not found Next physical within 3 mo: Visit date not found  Prescriber OR PCP: Alexia Don MD  Last diagnosis associated with med order: 1. Mild intermittent asthma with acute exacerbation  - fluticasone propionate (FLOVENT HFA) 220 mcg/actuation inhaler; Inhale 2 puffs 2 (two) times a day.  Dispense: 3 Inhaler; Refill: 0    If protocol passes may refill for 6 months if within 3 months of last provider visit (or a total of 9 months).

## 2021-06-04 NOTE — TELEPHONE ENCOUNTER
RN cannot approve Refill Request    RN can NOT refill this medication med is not covered by policy/route to provider. Last office visit: 5/23/2019 Tera Titus CNP Last Physical: Visit date not found Last MTM visit: Visit date not found Last visit same specialty: 10/16/2019 Isael Gonsalez MD.  Next visit within 3 mo: Visit date not found  Next physical within 3 mo: Visit date not found      Hiram Stern, [facility] Connection Triage/Med Refill 12/8/2019    Requested Prescriptions   Pending Prescriptions Disp Refills     FLOVENT  mcg/actuation inhaler [Pharmacy Med Name: FLOVENT  MCG ORAL INH 120INH]  0     Sig: INHALE 2 PUFFS BY MOUTH TWICE DAILY       There is no refill protocol information for this order

## 2021-06-05 VITALS
HEART RATE: 73 BPM | WEIGHT: 210 LBS | DIASTOLIC BLOOD PRESSURE: 88 MMHG | OXYGEN SATURATION: 97 % | SYSTOLIC BLOOD PRESSURE: 132 MMHG | BODY MASS INDEX: 37.21 KG/M2 | HEIGHT: 63 IN

## 2021-06-05 NOTE — TELEPHONE ENCOUNTER
Refill Request  Did you contact pharmacy: Pharmacy faxed and has not heard back.  The patient has changed mail order pharmacy and they need new orders please.  Medication name:   Requested Prescriptions     Pending Prescriptions Disp Refills     omeprazole (PRILOSEC) 20 MG capsule 90 capsule 0     Sig: Take 1 capsule (20 mg total) by mouth daily as needed.     levothyroxine (SYNTHROID, LEVOTHROID) 200 MCG tablet 90 tablet 3     Sig: TAKE ONE TABLET BY MOUTH ONCE DAILY AT 6:00 AM.     Who prescribed the medication: Dr Gonsalez and Dr Don  Requested Pharmacy: [facility]  Is patient out of medication: unknown  Patient notified refills processed in 3 business days:  no  Okay to leave a detailed message: yes

## 2021-06-06 NOTE — PROGRESS NOTES
"OV   2/28/2020  Assessment:         1. Vaginal itching  Wet Prep, Vaginal    fluconazole (DIFLUCAN) 150 MG tablet           Plan:         We reviewed the potential etiologies for her vaginal symptoms and labs were sent as noted above.  We will cover with diflucan as noted. We reviewed infection control measures, and she will call or return to clinic with any ongoing or worsening symptoms.          Subjective:             Dayan Madison is a 44 y.o. female who presents for evaluation of an abnormal vaginal discharge and itching. Symptoms have been present for 1 week. Vaginal symptoms: local irritation. She denies blisters and odor. Associated symptoms: none. Denies anorexia, arthralgias, fever and myalgias.       Contraception: none. Sexually transmitted infection risk: very low risk of STD exposure. Menstrual flow: regular every 28-30 days.    The following portions of the patient's history were reviewed and updated as appropriate: allergies, current medications and problem list     Review of Systems  A 12 point comprehensive review of systems was negative except as noted.   Mild URI symptoms recently       Objective:         Vitals:    02/28/20 1540   BP: 130/88   Pulse: 79   SpO2: 99%   Weight: 217 lb 5 oz (98.6 kg)   Height: 5' 4\" (1.626 m)      Physical Exam:  General: Alert, cooperative, no distress  Head: Normocephalic, without obvious abnormality, atraumatic  Eyes:  conjunctiva/corneas clear, EOM's intact  Throat: Lips, mucosa, and tongue normal; teeth and gums normal  Neck: Supple, symmetrical, trachea midline, no  Lungs: Clear to auscultation bilaterally, respirations unlabored  Heart: Regular rate and rhythm,  no murmur, rub, or gallop,   Abdomen: Soft, non-tender, no masses, no organomegaly  Pelvic:  normal external genitalia, moderate thick, white vaginal d/c, normal cervix,   no cervical motion tenderness.  Extremities: Extremities normal, atraumatic, no cyanosis or edema  Skin: Skin color, texture, " turgor normal, no rashes or lesions  Neurologic: Normal

## 2021-06-06 NOTE — PATIENT INSTRUCTIONS - HE
Monistat is available OTC.  Otherwise would need to make appointment.  Alexia Don MD    Based on the information provided, I would recommend you come in for an appointment to discuss these symptoms. Please schedule an appointment.    You will not be charged for this eVisit.

## 2021-06-07 ENCOUNTER — PREP FOR PROCEDURE (OUTPATIENT)
Dept: GASTROENTEROLOGY | Facility: CLINIC | Age: 46
End: 2021-06-07

## 2021-06-07 DIAGNOSIS — K83.8 DILATION OF BILIARY TRACT: Primary | ICD-10-CM

## 2021-06-07 NOTE — TELEPHONE ENCOUNTER
Per Dr. Morales  Would say its dependent of the patient wishes if a clinic visit is needed beforehand , Agree with ERCP spy     Please assist in scheduling:     Procedure/Imaging/Clinic: ERCP w/ laith  Physician: Dr. Morales  Timin/29  Procedure length:75 min  Anesthesia:general  Dx: biliary ductal dilatation   Tier:3  Location: [facility]      Called to discuss with patient. Pt ok to go direct to procedure and not have clinic visit prior.   Explained they can expect a call from  for date and time of procedure, will need a , someone to stay with them for 24 hours and should stay in town for 24 hours (within 45 min of Hospital) post procedure    Patient needs to get pre-op physical completed. If outside [facility] system will need physical faxed to number [phone]   If you do not get a preop physical, your procedure could be cancelled, patient voiced understanding*    Preop Plan: PCP will do     Med Review    Blood thinner -  no  ASA - no  Diabetic - no    COVID test discussed:understands to get test done  3-4 days prior    Patient Education r/t procedure:done    Does patient have any history of gastric bypass/gastric surgery/altered panc/bili anatomy?no    A pre-op nurse will call 1-2 days prior to the procedure. Is advised to be NPO/no solid food 8 hours before the procedure. Ok to drink clear liquids (Water, Apple Juice or Gatorade) up to 2 hours prior to procedure.     Other specific details/comments:none     Verbalized understanding of all instructions. All questions answered.                 .

## 2021-06-08 DIAGNOSIS — Z11.59 ENCOUNTER FOR SCREENING FOR OTHER VIRAL DISEASES: ICD-10-CM

## 2021-06-08 NOTE — PROGRESS NOTES
ASSESSMENT/PLAN:     1. Papillary Carcinoma Of The Thyroid Gland  Followed regularly by Endocrinology    2. Depression with anxiety  PH Q9 score is 17, ROSITA 7 score is 13.  We talked about how medication can help slowly to get back on track by counseling therapy can also be beneficial.  Patient will begin citalopram which is weight neutral and tends to have fewer side effects than other SSRIs.  She should see me back in 4-6 weeks to see how she is doing.  I strongly advised her to consider counseling therapy.  - citalopram (CELEXA) 20 MG tablet; Take 1 tablet (20 mg total) by mouth daily.  Dispense: 30 tablet; Refill: 2    3. BMI 36.0-36.9,adult  It's time to pay attention to her body because losing weight most definitely would help her reflux problems.    4. GERD (gastroesophageal reflux disease)  We'll switch her to Nexium 40 mg.  She may use the liquid Carafate as a rescue medication for breakthrough.  - esomeprazole (NEXIUM) 40 mg PO daily    5. Neoplasm of uncertain behavior  Patient will return to have this lesion reviewed and possibly excised in the next 2 months.        Return in about 4 weeks (around 3/13/2017) for Anxiety and skin lesion excised (40 min appt).      CHIEF COMPLAINT  Chief Complaint   Patient presents with     Nevus     growth, on should, first noticed, last summer , getting a little bigger,      Heartburn     nothing seems to help, happens sometime once a week, worse depending what pt eats,      Depression     depression/ anxiety, maybe needing medications        HPI:  Dayan Madison is a 41 y.o. female presenting to the clinic today for decreased mood and heartburn.    Depression: She reports that recently she has been feeling increasingly depressed and anxious. She has a history of depression and has been on fluoxetine in the past. She did not like this medication because it made her feel constantly hungry, and caused her to gain weight. She also experienced diarrhea and increased blood  pressure while on this medication. She often feels impatient and become frustrated with people easily. She feels like everyone is against her and she has been feeling stress due to nursing school. She continues to sleep okay. She has questions about possibly beginning medication     Heartburn: She reports that she continues to struggle with heartburn. She has taken Tums and Prilosec, but did not feel improvement. She describes this as pressure in the center of her abdomen. This pain does not go into her back. She believes this happens once a week, sometimes more depending on what she eats. She does not feel this discomfort when she sleeps. She can improve this pain by pressing on the middle of her abdomen, but states this causes her stomach to hurt. She went into the emergency room on 7/22/16 for similar pain and was treated with GI cocktail and prescribed liquid Carafate. She did not like the taste of this, and was not good about taking it regularly. She states that the GI cocktail immediately improved her pain. She took Carafate last week and said it took about two hours to improve her pain.    Papillary Carcinoma of the Thyroid Gland: She reports that she has felt okay overall. She last visited her endocrinologist for a follow up in June 2016. She was prescribed metformin and took for about 2 weeks, but stopped because it made her feel nauseous. Her last A1c was 5.9 in May 2016. Her glucose was 90 at that time.       REVIEW OF SYSTEMS:   She has a growth on her right shoulder that she noticed last summer She believes it is getting larger. She has started exercising more. She recently went running and has some chafing in her thighs. She has been coughing. All other systems negative.     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 She has had her gallbladder removed.     Patient Active Problem List   Diagnosis     Eczema     Postsurgical Hypothyroidism     Major Depression, Recurrent     Asthma     BMI  36.0-36.9,adult     Papillary Carcinoma Of The Thyroid Gland     Depression with anxiety       TOBACCO USE:  History   Smoking Status     Never Smoker   Smokeless Tobacco     Never Used     Social History     Social History     Marital status:      Spouse name: N/A     Number of children: N/A     Years of education: N/A     Occupational History     Not on file.     Social History Main Topics     Smoking status: Never Smoker     Smokeless tobacco: Never Used     Alcohol use Not on file     Drug use: Not on file     Sexual activity: Not on file     Other Topics Concern     Not on file     Social History Narrative       OBJECTIVE:   No results found for this or any previous visit (from the past 240 hour(s)).    Vitals:    02/13/17 1046   BP: 118/88   Pulse: 72     Weight: 211 lb (95.7 kg)  Wt Readings from Last 3 Encounters:   02/13/17 211 lb (95.7 kg)   12/08/16 204 lb (92.5 kg)   06/07/16 208 lb (94.3 kg)     Body mass index is 36.22 kg/(m^2).      Physical Exam:  GENERAL APPEARANCE: A&A, NAD, well hydrated, well nourished  SKIN:  Normal skin turgor, no rashes. Round, raised, red 4mm in diameter nodule right shoulder  CV: RRR, no M/G/R   LUNGS: CTAB, normal respiratory effort  ABDOMEN: S&NT, no masses, no organomegaly.  EXTREMITY: no edema   NEURO: no gross deficits   PSYCHIATRIC;  Mood appropriate, memory intact      Alexia Don MD    ADDITIONAL HISTORY SUMMARIZED (2): Reviewed endocrinology note from 6/7/16 regarding hypothyroidism.  DECISION TO OBTAIN EXTRA INFORMATION (1): None.   RADIOLOGY TESTS (1): None.  LABS (1): Reviewed labs from 6/2016.  MEDICINE TESTS (1): None.  INDEPENDENT REVIEW (2 each): None.     The visit lasted a total of 21 minutes face to face with the patient. Over 50% of the time was spent counseling and educating the patient about depressed mood and heartburn.    Phyllis VYAS, am scribing for and in the presence of, Dr. Don.    Dr. Arian VYAS, personally performed the  services described in this documentation, as scribed by Phyllis Rossi in my presence, and it is both accurate and complete.       MEDICATIONS   Current Outpatient Prescriptions   Medication Sig Dispense Refill     levothyroxine (SYNTHROID, LEVOTHROID) 200 MCG tablet Take 1 tablet (200 mcg total) by mouth Daily at 6:00 am. 90 tablet 3     omeprazole (PRILOSEC) 20 MG capsule Take 1 capsule (20 mg total) by mouth daily. 90 capsule 1     albuterol (PROVENTIL HFA;VENTOLIN HFA) 90 mcg/actuation inhaler Inhale 2 puffs every 6 (six) hours as needed for wheezing.       citalopram (CELEXA) 20 MG tablet Take 1 tablet (20 mg total) by mouth daily. 30 tablet 2     esomeprazole (NEXIUM) 40 mg injection Infuse 40 mg into a venous catheter every morning before breakfast. 90 each 1     metFORMIN (GLUCOPHAGE XR) 500 MG 24 hr tablet Take 1 tablet (500 mg total) by mouth daily with supper. 90 tablet 3     metFORMIN (GLUCOPHAGE) 500 MG tablet Take 1 tablet with supper 30 tablet 6     phentermine (ADIPEX-P) 37.5 mg tablet Take 1/2 tablet PO twice daily 30 tablet 0     triamcinolone (KENALOG) 0.5 % cream A thin layer to affected area(s) 30 g 2     VENTOLIN HFA 90 mcg/actuation inhaler Inhale.       No current facility-administered medications for this visit.          Total data points: 3

## 2021-06-10 NOTE — TELEPHONE ENCOUNTER
Last Med Check: 10/16/2019    Next med check due on: 4/16/2020    CSA on File: NA    Future Appointment Scheduled ? no    Last Med Refill? 1/22/2020    Daphne Dominique

## 2021-06-10 NOTE — TELEPHONE ENCOUNTER
RN cannot approve Refill Request    RN can NOT refill this medication Protocol failed and NO refill given. Last office visit: 2/13/2017 Alexia Don MD Last Physical: Visit date not found Last MTM visit: Visit date not found Last visit same specialty: 2/28/2020 Ramila Florez MD.  Next visit within 3 mo: Visit date not found  Next physical within 3 mo: Visit date not found      Imani Robin, [facility] Connection Triage/Med Refill 8/6/2020    Requested Prescriptions   Pending Prescriptions Disp Refills     levothyroxine (SYNTHROID, LEVOTHROID) 200 MCG tablet [Pharmacy Med Name: LEVOTHYROXINE SODIUM 200MCG TABS] 90 tablet 1     Sig: TAKE ONE TABLET BY MOUTH ONCE DAILY AT 6:00AM       Thyroid Hormones Protocol Failed - 8/6/2020  1:35 PM        Failed - Provider visit in past 12 months or next 3 months     Last office visit with prescriber/PCP: 2/13/2017 Alexia Don MD OR same dept: 2/28/2020 Ramila Florez MD OR same specialty: 2/28/2020 Ramila Florez MD  Last physical: Visit date not found Last MTM visit: Visit date not found   Next visit within 3 mo: Visit date not found  Next physical within 3 mo: Visit date not found  Prescriber OR PCP: Alexia Don MD  Last diagnosis associated with med order: 1. Papillary Carcinoma Of The Thyroid Gland  - levothyroxine (SYNTHROID, LEVOTHROID) 200 MCG tablet [Pharmacy Med Name: LEVOTHYROXINE SODIUM 200MCG TABS]; TAKE ONE TABLET BY MOUTH ONCE DAILY AT 6:00AM  Dispense: 90 tablet; Refill: 1    2. Postsurgical hypothyroidism  - levothyroxine (SYNTHROID, LEVOTHROID) 200 MCG tablet [Pharmacy Med Name: LEVOTHYROXINE SODIUM 200MCG TABS]; TAKE ONE TABLET BY MOUTH ONCE DAILY AT 6:00AM  Dispense: 90 tablet; Refill: 1    If protocol passes may refill for 12 months if within 3 months of last provider visit (or a total of 15 months).             Passed - TSH on file in past 12 months for patient age 12 & older     TSH   Date Value Ref Range Status    10/16/2019 0.03 (L) 0.30 - 5.00 uIU/mL Final

## 2021-06-12 NOTE — PROGRESS NOTES
1. Gastroesophageal reflux disease without esophagitis  omeprazole (PRILOSEC) 20 MG capsule    sucralfate (CARAFATE) 1 gram tablet   2. Obesity           ASSESSMENT/PLAN:     The following high BMI interventions were performed this visit: encouragement to exercise, weight monitoring, dietary needs education, exercise promotion: strength training and exercise promotion: stretching    Carafate liquid changed to tablets today per patient request. Increased patient's Omeprazole from 20 mg daily to 40 mg daily to see if this reduces the severity of her GERD symptoms. Follow up with PCP in the next 3 to 4 weeks if symptoms are not improved. Patient to continue working on eliminating foods that trigger her symptoms as well as weight reduction. Patient is due for a pap, last pap: 2013. Discussed. ROSITA-7 score: 4, PHQ-9 score: 4.    The visit lasted a total of 20 minutes face to face with the patient.  Over 50% of the time spent counseling and educating the patient about the above content.      Shanice Argueta NP          SUBJECTIVE:  Dayan Madison is a 42 y.o. female who presents with heartburn that began one year ago. Her discomfort is located in the epigastric region which comes and goes. She describes the pain as a burning sensation. Foods that trigger her symptoms include anything spicy, caffeine, acidic foods and any foods with tomato sauces. She tries extremely hard to avoid eating these foods, however, she continues to eat them in small amounts. She is aware the she is carrying some extra weight and has been making small changes to her diet to assist with weight loss. She is rating her pain a 6 out of 10 today. Medications reviewed: currently taking Carafate liquid and Omeprazole 20 mg daily. She used to take Nexium, but, due to the cost, she was not able to afford this even though her symptoms were much more controlled. Patient has undergone radioactive iodine therapy for her thyroid cancer 3 times. She did  not develop her heartburn issues until after her thyroid cancer treatments were completed. She was tested in 2012 for H.pylori which did come back with a normal level. 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 Her hours are 3 - 1130 p.m which is not ideal for her nutrition. She regularly does not eat after 8 p.m but there are nights where she will miss dinner at work which results in her eating a very late meal. She is requesting that her Carafate liquid be changed to tablets today. We did discuss the medication effects related to the liquid versus tablets. We did discuss her asthma which she feels if very well controlled. She has not had any hospitalizations related to her asthma in the last 12 months (see ACT under media). Addressed her anti-anxiety medication today. Currently takes Celexa 20 mg which she feels controls her anxiety just fine.   Chief Complaint   Patient presents with     Heartburn     on and off x 1 year         Patient Active Problem List   Diagnosis     Eczema     Postsurgical Hypothyroidism     Major Depression, Recurrent     Asthma     BMI 36.0-36.9,adult     Papillary Carcinoma Of The Thyroid Gland     Depression with anxiety       Current Outpatient Prescriptions   Medication Sig Dispense Refill     albuterol (PROVENTIL HFA;VENTOLIN HFA) 90 mcg/actuation inhaler Inhale 2 puffs every 6 (six) hours as needed for wheezing.       citalopram (CELEXA) 20 MG tablet Take 1 tablet (20 mg total) by mouth daily. 30 tablet 2     levothyroxine (SYNTHROID, LEVOTHROID) 200 MCG tablet TAKE ONE TABLET BY MOUTH ONCE DAILY AT 6:00 AM. 90 tablet 3     omeprazole (PRILOSEC) 20 MG capsule Take 1 capsule (20 mg total) by mouth 2 (two) times a day before meals. 90 capsule 1     triamcinolone (KENALOG) 0.5 % cream A thin layer to affected area(s) 30 g 2     VENTOLIN HFA 90 mcg/actuation inhaler Inhale.       sucralfate (CARAFATE) 1 gram tablet Take 1 tablet (1 g total) by mouth 4 (four) times a day. 120 tablet  3     No current facility-administered medications for this visit.        History   Smoking Status     Never Smoker   Smokeless Tobacco     Never Used       REVIEW OF SYSTEMS: Positive for sour taste, abdominal pain, nausea, burning sensation    TOBACCO USE:  History   Smoking Status     Never Smoker   Smokeless Tobacco     Never Used     Social History     Social History     Marital status:      Spouse name: N/A     Number of children: N/A     Years of education: N/A     Occupational History     Not on file.     Social History Main Topics     Smoking status: Never Smoker     Smokeless tobacco: Never Used     Alcohol use Not on file     Drug use: No     Sexual activity: Not on file     Other Topics Concern     Not on file     Social History Narrative         OBJECTIVE:       Vitals:    08/10/17 1346   BP: 120/86   Pulse: 66   Resp: 18   Temp: 97.5  F (36.4  C)   SpO2: 96%     Weight: 204 lb (92.5 kg)  Wt Readings from Last 3 Encounters:   08/10/17 204 lb (92.5 kg)   02/13/17 211 lb (95.7 kg)   12/08/16 204 lb (92.5 kg)     Body mass index is 35.02 kg/(m^2).        Physical Exam:  GENERAL APPEARANCE: A&A, NAD, well hydrated, well nourished  NECK: Supple, without lymphadenopathy, no thyroid mass, surgical noted, no JVD, no bruit  CV: RRR, no M/G/R   LUNGS: CTAB, normal respiratory effort  ABDOMEN: Soft, tender with palpation epigastric region, no masses, no organomegaly, BS present x4   PSYCHIATRIC;  Mood appropriate, memory intact, engaged in conversation, good eye contact

## 2021-06-12 NOTE — PROGRESS NOTES
Assessment:   1. Sore throat  Likely secondary to laryngitis.  Patient was encouraged to increase hydration use ibuprofen and Magic mouthwash return to the clinic if symptom persists or worsens.  - Rapid Strep A Screen-Throat  - Group A Strep, RNA Direct Detection, Throat  - alum/mag hydrox-simethicone-diphenhydramine-lidocaine (MAGIC MOUTHWASH) suspension; Swish and swallow 15 mL 4 (four) times a day.  Dispense: 240 mL; Refill: 0  - ibuprofen (ADVIL,MOTRIN) 600 MG tablet; Take 1 tablet (600 mg total) by mouth every 6 (six) hours as needed for pain.  Dispense: 30 tablet; Refill: 0     Plan:   Use of OTC analgesics recommended as well as salt water gargles.  Follow up as needed.     Subjective:   Dayan Madison is a 42 y.o. female who presents for evaluation of sore throat. Associated symptoms include chest congestion, headache, hoarseness, post nasal drip, sinus and nasal congestion, sore throat and swollen glands. Onset of symptoms was 3 days ago, and have been gradually worsening since that time. She is drinking moderate amounts of fluids. She has not had a recent close exposure to someone with proven streptococcal pharyngitis.    The following portions of the patient's history were reviewed and updated as appropriate:   She  has a past medical history of Anxiety; Asthma; Depression; Disease of thyroid gland; Eczema; Obese; and Thyroid cancer.  She  does not have any pertinent problems on file.  She  has a past surgical history that includes removal gallbladder and thyroidectomy.  Her family history includes Heart disease in her maternal grandmother.  She  reports that she has never smoked. She has never used smokeless tobacco. She reports that she does not use illicit drugs. Her alcohol history is not on file.  Current Outpatient Prescriptions   Medication Sig Dispense Refill     albuterol (PROVENTIL HFA;VENTOLIN HFA) 90 mcg/actuation inhaler Inhale 2 puffs every 6 (six) hours as needed for wheezing.        "citalopram (CELEXA) 20 MG tablet Take 1 tablet (20 mg total) by mouth daily. 30 tablet 2     levothyroxine (SYNTHROID, LEVOTHROID) 200 MCG tablet TAKE ONE TABLET BY MOUTH ONCE DAILY AT 6:00 AM. 90 tablet 3     omeprazole (PRILOSEC) 20 MG capsule Take 1 capsule (20 mg total) by mouth 2 (two) times a day before meals. 90 capsule 1     sucralfate (CARAFATE) 1 gram tablet Take 1 tablet (1 g total) by mouth 4 (four) times a day. 120 tablet 3     triamcinolone (KENALOG) 0.5 % cream A thin layer to affected area(s) 30 g 2     VENTOLIN HFA 90 mcg/actuation inhaler Inhale.       No current facility-administered medications for this visit.      Current Outpatient Prescriptions on File Prior to Visit   Medication Sig     albuterol (PROVENTIL HFA;VENTOLIN HFA) 90 mcg/actuation inhaler Inhale 2 puffs every 6 (six) hours as needed for wheezing.     citalopram (CELEXA) 20 MG tablet Take 1 tablet (20 mg total) by mouth daily.     levothyroxine (SYNTHROID, LEVOTHROID) 200 MCG tablet TAKE ONE TABLET BY MOUTH ONCE DAILY AT 6:00 AM.     omeprazole (PRILOSEC) 20 MG capsule Take 1 capsule (20 mg total) by mouth 2 (two) times a day before meals.     sucralfate (CARAFATE) 1 gram tablet Take 1 tablet (1 g total) by mouth 4 (four) times a day.     triamcinolone (KENALOG) 0.5 % cream A thin layer to affected area(s)     VENTOLIN HFA 90 mcg/actuation inhaler Inhale.     No current facility-administered medications on file prior to visit.      She has No Known Allergies..    Review of Systems  A 12 point comprehensive review of systems was negative except as noted.      Objective:   /62  Pulse 62  Temp 98.1  F (36.7  C) (Oral)   Ht 5' 4\" (1.626 m)  Wt 207 lb 4.8 oz (94 kg)  LMP 08/15/2017  SpO2 97%  BMI 35.58 kg/m2  General appearance: alert, appears stated age and cooperative  Head: Normocephalic, without obvious abnormality, atraumatic  Eyes: conjunctivae/corneas clear. PERRL, EOM's intact. Fundi benign.  Ears: normal TM's and " external ear canals both ears  Nose: Nares normal. Septum midline. Mucosa normal. No drainage or sinus tenderness.  Throat: lips, mucosa, and tongue normal; teeth and gums normal  Neck: no adenopathy, no carotid bruit, no JVD, supple, symmetrical, trachea midline and thyroid not enlarged, symmetric, moderate tenderness but no mass and nodules  Lungs: clear to auscultation bilaterally  Heart: regular rate and rhythm, S1, S2 normal, no murmur, click, rub or gallop  Pulses: 2+ and symmetric  Skin: Skin color, texture, turgor normal. No rashes or lesions  Lymph nodes: Cervical, supraclavicular, and axillary nodes normal.  Neurologic: Grossly normal    Laboratory  Strep test done. Results:negative.

## 2021-06-13 NOTE — TELEPHONE ENCOUNTER
Pt notified. She states that it should just be left alone at the moment. No further action needed.     Luz Maria Harrell, CMA

## 2021-06-13 NOTE — TELEPHONE ENCOUNTER
Sent off to [facility]. Does she want us to resend to mail order as well or just leave it alone?    Tera Titus, CNP

## 2021-06-13 NOTE — TELEPHONE ENCOUNTER
Medication Question or Clarification  Who is calling: patient  What medication are you calling about (include dose and sig)?:   levothyroxine (SYNTHROID, LEVOTHROID) 200 MCG tablet 90 tablet 0 12/2/2020     Sig: TAKE ONE TABLET BY MOUTH ONCE DAILY AT 6:00AM    Sent to pharmacy as: levothyroxine 200 mcg tablet (SYNTHROID, LEVOTHROID)    E-Prescribing Status: Receipt confirmed by pharmacy (12/2/2020  1:27 PM CST)        Who prescribed the medication?: Tera Titus CNP  What is your question/concern?: I am totally out of this medication and the  mail order denied ever getting this medication. Please send this medication to [facility] in [place].  Requested Pharmacy: [facility]  Okay to leave a detailed message?: Yes

## 2021-06-13 NOTE — PATIENT INSTRUCTIONS - HE
If you need refill of the medications, just go ahead and let us or the pharmacy know.    Updating yearly labs.    You should be getting a call about that mammogram and the physical next summer with pap.    If you ever want the pneumonia vaccine, just go ahead and let me know.

## 2021-06-13 NOTE — PROGRESS NOTES
Chief Complaint   Patient presents with     Establish Care     Transferring from Dr. Don.     Medication Management       HPI: Overall patient is doing okay.  She admits that her asthma is not under as good of control as it could be.  She also admits to not using her inhalers regularly like she should be.  Currently prescribed Flovent and as needed albuterol.  Symptoms oftentimes will get worse during the colder weather months.  She says that she plans on getting more responsible with this.    BMI 37.8 today.  Plans on going back to weight watchers after [place].  Is hopeful to have in person meetings rather than over Zoom.  Exercising 3-4 times a week.  Met with the weight loss clinic in the past and she apparently is not a great candidate for phentermine.  Not interested in surgical intervention.    Currently is off citalopram.  She wonders if that was causing many of her digestive issues.  She did have elevated LFTs along with a scope procedure last year for this.  No further epigastric issues.    Due for mammography.    No issues with current levothyroxine dosing.  Status post thyroidectomy due to papillary carcinoma.    She is working now as a travel nurse.  Most of her shifts have been at [employer].  She likes the work that she is doing.    ROS:Review of Systems - negative except for what's listed in the HPI    SH: The Patient's  reports that she has never smoked. She has never used smokeless tobacco. She reports current alcohol use. She reports that she does not use drugs.      FH: The Patient's family history includes Breast cancer in her maternal aunt and maternal aunt; Heart disease in her maternal grandmother.     Meds:    Current Outpatient Medications on File Prior to Visit   Medication Sig Dispense Refill     levothyroxine (SYNTHROID, LEVOTHROID) 200 MCG tablet TAKE ONE TABLET BY MOUTH ONCE DAILY AT 6:00AM 90 tablet 0     omeprazole (PRILOSEC) 20 MG capsule Take 1 capsule (20 mg total) by mouth daily  "as needed. 90 capsule 2     triamcinolone (KENALOG) 0.5 % cream A thin layer to affected area(s) (Patient taking differently: Apply topically 2 (two) times a day as needed. A thin layer to affected area(s)      ) 30 g 2     albuterol (PROAIR HFA;PROVENTIL HFA;VENTOLIN HFA) 90 mcg/actuation inhaler Inhale 2 puffs every 6 (six) hours as needed for wheezing. 1 Inhaler 3     fluticasone propionate (FLOVENT HFA) 220 mcg/actuation inhaler Inhale 2 puffs 2 (two) times a day. 3 Inhaler 1     [DISCONTINUED] citalopram (CELEXA) 20 MG tablet Take 1 tablet (20 mg total) by mouth daily. 90 tablet 2     No current facility-administered medications on file prior to visit.        O:  /88   Pulse 73   Ht 5' 2.5\" (1.588 m)   Wt 210 lb (95.3 kg)   LMP 12/05/2020   SpO2 97%   BMI 37.80 kg/m      Physical Examination:   General appearance - alert, obese, well appearing, and in no distress  Mental status - alert, oriented to person, place, and time  Neck - no significant adenopathy.  Surgically absent thyroid.  Lymphatics - no palpable lymphadenopathy  Chest - clear to auscultation, no wheezes, rales or rhonchi, symmetric air entry  Heart - normal rate and regular rhythm, S1 and S2 normal, no murmurs noted  Neurological - alert, oriented, normal speech, motor and sensory grossly normal bilaterally, no tremors, strength 5/5  Extremities - peripheral pulses normal, no peripheral edema  Skin - normal coloration and turgor.      A/P:     Problem List Items Addressed This Visit        [facility] Cardiac Problems    Asthma    Papillary Carcinoma Of The Thyroid Gland - Primary    Relevant Orders    Thyroid Cascade       Other    Depression with anxiety    GERD (gastroesophageal reflux disease)    Obesity (BMI 35.0-39.9) with comorbidity (H)      Other Visit Diagnoses     Lipid screening        Relevant Orders    Lipid Cascade RANDOM    Encounter for screening mammogram for breast cancer        Relevant Orders    Mammo Screening " Bilateral    Screening for HIV (human immunodeficiency virus)        Relevant Orders    HIV Antigen/Antibody Screening Cascade    Elevated LFTs        Relevant Orders    Comprehensive Metabolic Panel        Encouraged use of Flovent.  Offered pneumococcal vaccination which was declined.  Encouraged weight loss which she plans to be starting shortly.  Try to work on calorie reduction in conjunction with exercise.  Screening labs along with mammogram ordered.  Update thyroid labs.  Recheck CMP to ensure stability.  Come back in the summer for physical with Pap.  Okay to do with a female if that would make her feel more comfortable.    1. Papillary Carcinoma Of The Thyroid Gland  - Thyroid Cascade    2. Mild persistent asthma without complication    3. Depression with anxiety    4. Gastroesophageal reflux disease without esophagitis    5. Obesity (BMI 35.0-39.9) with comorbidity (H)    6. Lipid screening  - Lipid [place] RANDOM    7. Encounter for screening mammogram for breast cancer  - Mammo Screening Bilateral; Future    8. Screening for HIV (human immunodeficiency virus)  - HIV Antigen/Antibody Screening Cascade    9. Elevated LFTs  - Comprehensive Metabolic Panel        Tera Titus, CNP      This note has been dictated using voice recognition software. Any grammatical or context distortions are unintentional and inherent to the software.

## 2021-06-13 NOTE — PROGRESS NOTES
Assessment & Plan:     1. Epigastric pain  Ambulatory Referral for Upper GI Endoscopy   2. Gastroesophageal reflux disease without esophagitis  Ambulatory Referral for Upper GI Endoscopy         We reviewed the potential etiologies for her epigastric pain and GERD symptoms and we will have her proceed to [facility] today for further treatment and evaluation. Assuming that she is able to go home, we will arrange for an EGD as an outpatient. We reviewed use of OTC antacids, and she will continue her same medications and a bland diet. She will call or return to clinic with any ongoing or worsening symptoms. She will otherwise f/u as needed.       Subjective:          Dayan Madison is a 42 y.o. female who presents for evaluation of recurrent abdominal pain with severe symptoms today. Onset was 3 hours ago, gradual onset. Symptoms have worsened, beginning 1 hour ago and she was on her way to work in [place] when she stopped at our clinic due to the pain. The pain is described as burning and pinching, and is 9/10 in intensity. Pain is located in the epigastric region and is worse with sitting up and walking. Feels most comfortable on her right side with legs flexed. Aggravating factors: movement and sitting up.  Alleviating factors: none. She has tried carafatex2 and gaviscon in addition to taking her regular omeprazole 40 mg today.   Associated symptoms: belching. The patient denies chills, diarrhea, fever, nausea and vomiting.      She's had ongoing symptoms for last year, and they started after thyroid cancer tx with thyrogen and radioactive iodine. She thought anxiety might be contributing and has been on celexa for 1 month without change in the symptoms.     The following portions of the patient's history were reviewed and updated as appropriate: allergies, current medications, past family history, past medical history, past social history, past surgical history and problem list.    Review of Systems  Pertinent  items are noted in HPI.       Objective:     /82  Pulse 80  Temp 97.6  F (36.4  C)  Breastfeeding? No  Physical Exam:  GEN: Alert and oriented, moderate distress. She is lying on her right side on exam table.   SKIN:  Normal skin turgor, no lesions/rashes   HEENT: moist mucous membranes, no rhinorrhea.    NECK: Normal.   CV: Regular rate and rhythm.   LUNGS: Clear to auscultation bilaterally.    ABDOMEN: exquisitely tender in epigastrium   EXTREMITY: No edema, cyanosis  NEURO: Grossly normal.

## 2021-06-15 NOTE — PROGRESS NOTES
Dayan Madison is a 45 y.o. female who is being evaluated via a billable video visit.      How would you like to obtain your AVS? MyChart.  If dropped from the video visit, the video invitation should be resent by: Text to cell phone: [phone]  Will anyone else be joining your video visit? No      Video Start Time: 2:48 PM    Assessment & Plan     1. Depression with anxiety  FLUoxetine (PROZAC) 20 MG capsule   2. Postsurgical hypothyroidism  levothyroxine (SYNTHROID, LEVOTHROID) 200 MCG tablet   3. Papillary Carcinoma Of The Thyroid Gland  levothyroxine (SYNTHROID, LEVOTHROID) 200 MCG tablet   4. Sphincter of Oddi dysfunction  omeprazole (PRILOSEC) 20 MG capsule   5. Eczema of hand  triamcinolone (KENALOG) 0.5 % cream        We reviewed the etiology and natural history for depression/anxiety and options for treatment.  She would like to resume fluoxetine 20 mg daily as tolerated. We reviewed the potential side effects and indications for urgent evaluation. She will let me know if she has any significant issues or experiences any significant side effects. I also renewed her thyroid medication and omeprazole as directed. I will also send a new Rx for triamcinolone. She should f/u in 4-6 mos for med check, sooner if problems.            Subjective     Dayan Madison is 45 y.o. and presents to clinic today for the following health issues   HPI                 Dayan Madison is a 45 y.o. female was contacted for follow up of depression and anxiety. Onset of symptoms was several years ago. Symptoms have been waxed and waned since that time, but have been much worse in the last few mos after stopping her medications last spring. She has the following anxiety/mood symptoms: difficulty concentrating, feelings of losing control and racing thoughts and depressed mood, anhedonia, insomnia, feelings of worthlessness/guilt, difficulty concentrating, loss of energy/fatigue and increased appetite. Patient denies  hopelessness and recurrent thoughts of death and panic attacks. She denies current suicidal and homicidal ideation. She has been out of work and is having difficulty with interviews and applications.        Current treatment includes none. Stopped celexa in May of last year. She reports the following medication side effects: none. Previous treatment includes Prozac. She complains of the following medication side effects: none.        She needs a refill on her thyroid medication, and omeprazole at this time. She had labs drawn in December with Tera.                    Review of Systems   12 point ROS negative except as noted above.        Objective    Vitals - Patient Reported  Weight (Patient Reported): 206 lb (93.4 kg)    Physical Exam  GENERAL: Healthy, alert and no distress  EYES: Eyes grossly normal to inspection. No discharge or erythema, or obvious scleral/conjunctival abnormalities.  RESP: No audible wheeze, cough, or visible cyanosis.  No visible retractions or increased work of breathing.    NEURO: Cranial nerves grossly intact. Mentation and speech appropriate for age.  PSYCH: Mentation appears normal, affect normal/bright, judgement and insight intact, normal speech and appearance well-groomed             Video-Visit Details    Type of service:  Video Visit    Video End Time (time video stopped): 3:07  Originating Location (pt. Location): Home    Distant Location (provider location):  [facility]     Platform used for Video Visit: [employer]

## 2021-06-15 NOTE — PROGRESS NOTES
Preoperative Exam    Scheduled Procedure: ERCP/Sphincter of Oddi manometry  Surgery Date:  01/30/18  Surgery Location: [address] [place] [address]    Surgeon:  Dr. Villareal    Assessment/Plan:     1. Pre-op examination  Pregnancy (Beta-hCG, Qual), Urine    HM2(CBC w/o Differential)    Comprehensive Metabolic Panel   2. Epigastric pain  Pregnancy (Beta-hCG, Qual), Urine    HM2(CBC w/o Differential)    Comprehensive Metabolic Panel   3. Elevated bilirubin  Pregnancy (Beta-hCG, Qual), Urine    HM2(CBC w/o Differential)    Comprehensive Metabolic Panel   4. Elevated alkaline phosphatase level  Pregnancy (Beta-hCG, Qual), Urine    HM2(CBC w/o Differential)    Comprehensive Metabolic Panel   5. Elevated LFTs  Pregnancy (Beta-hCG, Qual), Urine    HM2(CBC w/o Differential)    Comprehensive Metabolic Panel   6. GERD (gastroesophageal reflux disease)  Pregnancy (Beta-hCG, Qual), Urine    HM2(CBC w/o Differential)    Comprehensive Metabolic Panel     Med list reconciled    The 10-year ASCVD risk score (Illiopolisajit VIDALES Jr, et al., 2013) is: 0.4%    Values used to calculate the score:      Age: 42 years      Sex: Female      Is Non- : No      Diabetic: No      Tobacco smoker: No      Systolic Blood Pressure: 124 mmHg      Is BP treated: No      HDL Cholesterol: 61 mg/dL      Total Cholesterol: 160 mg/dL      Surgical Procedure Risk: Low (reported cardiac risk generally < 1%)  Have you had prior anesthesia?: Yes  Have you or any family members had a previous anesthesia reaction:  No  Do you or any family members have a history of a clotting or bleeding disorder?: No  Cardiac Risk Assessment: no increased risk for major cardiac complications    Patient approved for surgery with general or local anesthesia.    Please Note:  None    Functional Status: Independant  Patient plans to recover at home with family.     Subjective:      Dayan Madison is a 42 y.o. female who presents for a preoperative consultation.  Patient with a  history of thyroid cancel, status post thyroidectomy, anxiety, depression, GERD, status post cholecystectomy for cholelithiasis.  Summary of events taken from [facility] evaluation documentation. Patient has been experiencing ongoing epigastric pain for the last several years which include progressive episodes of epigastric pain along with a recent severe episode associated with abnormal liver function testing.  She did undergo cholecystectomy for symptomatic gallstones in 2000 which initially helped with reducing her abdominal discomfort although it never completely resolved.  She was treated with multiple types of PPIs and ultimately ended up on Nexium daily which did resolve her epigastric pain for a number of years.  Towards the beginning of August 2016 she began having recurrent symptoms of epigastric abdominal pain which was more intense but she also noted a burning sensation with pressure-like discomfort frequently radiating to her back.  Symptoms would typically occur after eating fatty foods.  They would last 15 minutes to several hours. She was evaluated at [facility] Emergency Department on November 3 for her ongoing epigastric discomfort where CT scan of the abdomen with IV contrast was performed and demonstrated slight central intrahepatic biliary prominence.  At the time, her extrahepatic bile ducts appeared normal.  She did have some degenerative endings of the left SI joint, but exam was otherwise unremarkable.  She underwent EGD in November which was essentially unremarkable revealing gastric polyps along with hiatal hernia.  Duodenal biopsies returned with normal limits with gastric biopsy showing no diagnostic abnormalities.  Random gastric abscesses were also unremarkable with no evidence of Helicobacter.  Recent abnormal lab results from November 3, 2017: Total bilirubin of 2.3, elevated alkaline phosphatase of 151, AST of 138, ALT of 92 and elevated lipase of 7, urine pregnancy negative, Hep B surface  antigen positive.  Today, her vitals are stable and she is rating her epigastric discomfort 0 out of 10 today.  She denies any recent nausea, vomiting, diarrhea or constipation issues. She is sitting in exam chair drinking water without difficulty or discomfort. No swallowing issues noted.       Pertinent History   Do you have difficulty breathing or chest pain after walking up a flight of stairs: No  History of obstructive sleep apnea: No  Steroid use in the last 6 months: No  Frequent Aspirin/NSAID use: Yes: Ibuprofren  Prior Blood Transfusion: No  Prior Blood Transfusion Reaction: No  If for some reason prior to, during or after the procedure, if it is medically indicated, would you be willing to have a blood transfusion?:  There is no transfusion refusal.    Current Outpatient Prescriptions   Medication Sig Dispense Refill     albuterol (PROVENTIL HFA;VENTOLIN HFA) 90 mcg/actuation inhaler Inhale 2 puffs every 6 (six) hours as needed for wheezing.       alum/mag hydrox-simethicone-diphenhydramine-lidocaine (MAGIC MOUTHWASH) suspension Swish and swallow 15 mL 4 (four) times a day. 240 mL 0     citalopram (CELEXA) 20 MG tablet TAKE 1 TABLET BY MOUTH EVERY DAY 90 tablet 2     ibuprofen (ADVIL,MOTRIN) 600 MG tablet Take 1 tablet (600 mg total) by mouth every 6 (six) hours as needed for pain. 30 tablet 0     levothyroxine (SYNTHROID, LEVOTHROID) 200 MCG tablet TAKE ONE TABLET BY MOUTH ONCE DAILY AT 6:00 AM. 90 tablet 3     omeprazole (PRILOSEC) 20 MG capsule Take 1 capsule (20 mg total) by mouth 2 (two) times a day before meals. 90 capsule 1     sucralfate (CARAFATE) 1 gram tablet Take 1 tablet (1 g total) by mouth 4 (four) times a day. 120 tablet 3     triamcinolone (KENALOG) 0.5 % cream A thin layer to affected area(s) 30 g 2     VENTOLIN HFA 90 mcg/actuation inhaler Inhale.       No current facility-administered medications for this visit.         No Known Allergies    Patient Active Problem List   Diagnosis      "Eczema     Postsurgical Hypothyroidism     Major Depression, Recurrent     Asthma     BMI 36.0-36.9,adult     Papillary Carcinoma Of The Thyroid Gland     Depression with anxiety     Epigastric pain     Elevated bilirubin     Elevated alkaline phosphatase level     Elevated LFTs     GERD (gastroesophageal reflux disease)       Past Medical History:   Diagnosis Date     Anxiety      Asthma      Depression      Disease of thyroid gland      Eczema      Obese      Thyroid cancer        Past Surgical History:   Procedure Laterality Date     [place] REMOVAL GALLBLADDER      Description: Cholecystectomy;  Recorded: 03/22/2010;  Comments: 2000     [place] THYROIDECTOMY  2005    Thyroid Surgery Total Thyroidectomy;  Papillary thyroid cancer       Social History     Social History     Marital status:      Spouse name: N/A     Number of children: N/A     Years of education: N/A     Occupational History     Not on file.     Social History Main Topics     Smoking status: Never Smoker     Smokeless tobacco: Never Used     Alcohol use Not on file     Drug use: No     Sexual activity: Not on file     Other Topics Concern     Not on file     Social History Narrative       Patient Care Team:  Alexia Don MD as PCP - General          Objective:     Vitals:    01/23/18 1411   BP: 124/76   Pulse: 76   Resp: 16   Temp: 97.9  F (36.6  C)   SpO2: 99%   Weight: 202 lb (91.6 kg)   Height: 5' 4\" (1.626 m)   PainSc: 0-No pain   LMP: 01/04/2018         Physical Exam:    Review of Systems     Denies fever, chills, visual changes, fatigue, myalgias, nasal congestion, rhinorrhea, ear pain, or discharge, sore throat, swollen glands, breast mass, nipple discharge, breast changes, cough, shortness of breath, chest pain, weight change, change in bowel habits, melena, rectal bleeding, dysuria, frequency, urgency, hematuria, polyuria, polydipsia, polyphagia, swelling or erythema, edema, rash, weakness, paresthesias, vaginal discharge or bleeding or " "mood changes.     Positive: epigastric pain intermittent, right wrist tendonitis  Objective:         /76  Pulse 76  Temp 97.9  F (36.6  C)  Resp 16  Ht 5' 4\" (1.626 m)  Wt 202 lb (91.6 kg)  LMP 01/04/2018 (Approximate)  SpO2 99%  Breastfeeding? No  BMI 34.67 kg/m2     Physical Exam:    General Appearance: Alert, cooperative, no distress, appears stated age  Head: Normocephalic, without obvious abnormality, atraumatic  Eyes: PERRL, conjunctiva/corneas clear, EOM's intact  Ears: Normal TM's and external ear canals, both ears  Nose: Nares normal, septum midline,mucosa normal, no drainage  Throat: Lips, mucosa, and tongue normal; teeth and gums normal  Neck: Supple, symmetrical, trachea midline, no adenopathy;  thyroid: not enlarged, symmetric, no tenderness/mass/nodules; no carotid bruit or JVD  Back: Symmetric, no curvature, ROM normal, no CVA tenderness  Lungs: Clear to auscultation bilaterally, respirations unlabored  Heart: Regular rate and rhythm, S1 and S2 normal, no murmur, rub, or gallop, no edema, pedal pulses intact bilaterally  Abdomen: Soft, non-tender, bowel sounds active all four quadrants,  no masses, no organomegaly  Extremities: Extremities normal, atraumatic, no cyanosis or edema, she has right wrist brace in place for her tendonitis pain  Skin: Skin color, texture, turgor normal, no rashes or lesions  Lymph nodes: Cervical, supraclavicular, and axillary nodes normal  Neurologic: Normal, DTRs intact, equal  strength, no drift, face symmetrical, negative Romberg             Patient Instructions     Hold all supplements, aspirin and NSAIDs for 7 days prior to surgery.  Follow your surgeon's direction on when to stop eating and drinking prior to surgery.  Your surgeon will be managing your pain after your surgery.    Remove all jewelry and metal piercings before your surgery.   Remove nail polish from fingers before surgery.          Labs:  Labs pending at this time.  Results will be " reviewed when available.    Immunizations are up to date.       Electronically signed by Shanice Argueta NP 01/23/18 2:08 PM

## 2021-06-17 NOTE — TELEPHONE ENCOUNTER
Refill Approved    Rx renewed per Medication Renewal Policy. Medication was last renewed on 2/8/21.    Richard Ying, [facility] Connection Triage/Med Refill 4/27/2021     Requested Prescriptions   Pending Prescriptions Disp Refills     FLUoxetine (PROZAC) 20 MG capsule 30 capsule 2     Sig: Take 1 capsule (20 mg total) by mouth daily.       SSRI Refill Protocol  Passed - 4/26/2021 11:18 AM        Passed - PCP or prescribing provider visit in last year     Last office visit with prescriber/PCP: 12/9/2020 Tera Titus CNP OR same dept: 12/9/2020 Tera Titus CNP OR same specialty: 12/9/2020 Tera Titus CNP  Last physical: Visit date not found Last MTM visit: Visit date not found   Next visit within 3 mo: Visit date not found  Next physical within 3 mo: Visit date not found  Prescriber OR PCP: Tera Titus CNP  Last diagnosis associated with med order: 1. Depression with anxiety  - FLUoxetine (PROZAC) 20 MG capsule; Take 1 capsule (20 mg total) by mouth daily.  Dispense: 30 capsule; Refill: 2    If protocol passes may refill for 12 months if within 3 months of last provider visit (or a total of 15 months).

## 2021-06-17 NOTE — TELEPHONE ENCOUNTER
Forms Request  Name of form/paperwork: Other:  [employer]  Have you been seen for this request: N/A  Do we have the form: Yes- IN NATHANS INBOX  When is form needed by: WHEN DONE   How would you like the form returned: Fax:  N/A  Patient Notified form requests are processed in 3-5 business days: No    Okay to leave a detailed message? No

## 2021-06-19 ENCOUNTER — AMBULATORY - HEALTHEAST (OUTPATIENT)
Dept: FAMILY MEDICINE | Facility: CLINIC | Age: 46
End: 2021-06-19

## 2021-06-19 ENCOUNTER — HEALTH MAINTENANCE LETTER (OUTPATIENT)
Age: 46
End: 2021-06-19

## 2021-06-19 DIAGNOSIS — Z11.59 ENCOUNTER FOR SCREENING FOR OTHER VIRAL DISEASES: ICD-10-CM

## 2021-06-21 NOTE — LETTER
Letter by Tera Titus CNP at      Author: Tera Titus CNP Service: -- Author Type: --    Filed:  Encounter Date: 12/9/2020 Status: (Other)       My Asthma Action Plan    Name: Dayan Madison   YOB: 1975  Date: 12/9/2020   My doctor: Tera Titus CNP   My clinic: [facility]        My Control Medicine: INHALED CORTICOSTEROIDS  My Rescue Medicine: Albuterol (Proair/Ventolin/Proventil HFA) 2-4 puffs EVERY 4 HOURS as needed. Use a spacer if recommended by your provider.   My Oral Steroid Medicine: prednisone My Asthma Severity:   Mild Persistent  Know your asthma triggers: cold air               GREEN ZONE   Good Control    I feel good    No cough or wheeze    Can work, sleep and play without asthma symptoms     Take your asthma control medicine every day.     1. If exercise triggers your asthma, take your rescue medication    15 minutes before exercise or sports, and    During exercise if you have asthma symptoms  2. Spacer to use with inhaler: If you have a spacer, make sure to use it with your inhaler             YELLOW ZONE Getting Worse  I have ANY of these:    I do not feel good    Cough or wheeze    Chest feels tight    Wake up at night 1. Keep taking your Green Zone medications  2. Start taking your rescue medicine:    every 20 minutes for up to 1 hour. Then every 4 hours for 24-48 hours.  3. If you stay in the Yellow Zone for more than 12-24 hours, contact your doctor.  4. If you do not return to the Green Zone in 12-24 hours or you get worse, start taking your oral steroid medicine if prescribed by your provider.           RED ZONE Medical Alert - Get Help  I have ANY of these:    I feel awful    Medicine is not helping    Breathing getting harder    Trouble walking or talking    Nose opens wide to breathe     1. Take your rescue medicine NOW  2. If your provider has prescribed an oral steroid medicine, start taking it NOW  3. Call your doctor  NOW  4. If you are still in the Red Zone after 20 minutes and you have not reached your doctor:    Take your rescue medicine again and    Call 911 or go to the emergency room right away    See your regular doctor within 2 weeks of an Emergency Room or Urgent Care visit for follow-up treatment.          Annual Reminders:  Meet with Asthma Educator,  Flu Shot in the Fall, consider Pneumonia Vaccination for patients with asthma (aged 19 and older).    Pharmacy:   [facility] - [place], [place] - [address] [place] [address] [address] [address]  Phone: [phone] Fax: [phone]      Electronically signed by Tera Titus CNP   Date: 12/09/20                    Asthma Triggers  How To Control Things That Make Your Asthma Worse    Triggers are things that make your asthma worse.  Look at the list below to help you find your triggers and what you can do about them.  You can help prevent asthma flare-ups by staying away from your triggers.      Trigger                                                          What you can do   Cigarette Smoke  Tobacco smoke can make asthma worse. Do not allow smoking in your home, car or around you.  Be sure no one smokes at a chris day care or school.  If you smoke, ask your health care provider for ways to help you quit.  Ask family members to quit too.  Ask your health care provider for a referral to Quit Plan to help you quit smoking, or call 5-400-680-PLAN.     Colds, Flu, Bronchitis  These are common triggers of asthma. Wash your hands often.  Dont touch your eyes, nose or mouth.  Get a flu shot every year.     Dust Mites  These are tiny bugs that live in cloth or carpet. They are too small to see. Wash sheets and blankets in hot water every week.   Encase pillows and mattress in dust mite proof covers.  Avoid having carpet if you can. If you have carpet, vacuum weekly.   Use a dust mask and HEPA vacuum.   Pollen and Outdoor Mold  Some people  are allergic to trees, grass, or weed pollen, or molds. Try to keep your windows closed.  Limit time out doors when pollen count is high.   Ask you health care provider about taking medicine during allergy season.     Animal Dander  Some people are allergic to skin flakes, urine or saliva from pets with fur or feathers. Keep pets with fur or feathers out of your home.    If you cant keep the pet outdoors, then keep the pet out of your bedroom.  Keep the bedroom door closed.  Keep pets off cloth furniture and away from stuffed toys.     Mice, Rats, and Cockroaches   Some people are allergic to the waste from these pests.   Cover food and garbage.  Clean up spills and food crumbs.  Store grease in the refrigerator.   Keep food out of the bedroom.   Indoor Mold  This can be a trigger if your home has high moisture. Fix leaking faucets, pipes, or other sources of water.   Clean moldy surfaces.  Dehumidify basement if it is damp and smelly.   Smoke, Strong Odors, and Sprays  These can reduce air quality. Stay away from strong odors and sprays, such as perfume, powder, hair spray, paints, smoke incense, paint, cleaning products, candles and new carpet.   Exercise or Sports  Some people with asthma have this trigger. Be active!  Ask your doctor about taking medicine before sports or exercise to prevent symptoms.    Warm up for 5-10 minutes before and after sports or exercise.     Other Triggers of Asthma  Cold air:  Cover your nose and mouth with a scarf.  Sometimes laughing or crying can be a trigger.  Some medicines and food can trigger asthma.

## 2021-06-23 NOTE — PROGRESS NOTES
Progress Note    Reason for Visit:  Chief Complaint     Thyroid Problem          Progress Note:    HPI:       This Pleasant 41-year-old Female Patient Is Here for Follow-Up Because of Papillary Thyroid Cancer Diagnosed in 2002.  She Received Radioactive Iodine Twice.  We Did a Whole Body Scan Thyrogen  Stimulated That Was Clear.  Also Thyroid Ultrasound Was Clear.\  Right Now the Patient Is on Synthroid 200  g Daily TSH 0.02 3 T4 1 0.5 Thyroglobulin and Thyroglobulin Tumor Marker Is Undetectable.  I Have Discussed All These Data with the Patient and I Told Her to  One Day of the Week Which She Takes Half a Tablet.\She Has Acid Reflux Output on Prilosec 20 Mg Once a Day and I Told Her Not to Take It with a Thyroid Hormone At Least 4 Hours Apart.\Her A1c Is 5.9 She Has Prediabetes I Did Advise Her to Take Metformin in Extended Release 500 Mg Supper         Component      Latest Ref Rng & Units 1/23/2018 9/10/2018 9/21/2018   Sodium      136 - 145 mmol/L 138  133 (L)   Potassium      3.5 - 5.0 mmol/L 4.0 3.9 3.3 (L)   Chloride      98 - 107 mmol/L 104  101   CO2      22 - 31 mmol/L 28  24   Anion Gap, Calculation      5 - 18 mmol/L 6  8   Glucose      70 - 125 mg/dL 83  154 (H)   BUN      8 - 22 mg/dL 13  11   Creatinine      0.60 - 1.10 mg/dL 0.64 0.62 0.74   GFR MDRD Af Amer      >60 mL/min/1.73m2 >60 >60 >60   GFR MDRD Non Af Amer      >60 mL/min/1.73m2 >60 >60 >60   Bilirubin, Total      0.0 - 1.0 mg/dL 0.9  1.8 (H)   Calcium      8.5 - 10.5 mg/dL 9.2  9.7   Protein, Total      6.0 - 8.0 g/dL 6.5  6.9   ALBUMIN      3.5 - 5.0 g/dL 3.3 (L)  3.3 (L)   Alkaline Phosphatase      45 - 120 U/L 96  76   AST      0 - 40 U/L 20  16   ALT      0 - 45 U/L 24  17   Thyroglobulin Antibody      0.0 - 4.0 IU/mL  <0.9    Thyroglobulin, Serum or Plasma      1.3 - 31.8 ng/mL  0.1 (L)    Thyroglobulin by LC-MS/MS, Serum/Plasma      1.3 - 31.8 ng/mL  Not Applicable    TSH      0.30 - 5.00 uIU/mL  0.05 (L)    Free T4      0.7 -  1.8 ng/dL  1.5    T3, Total      45 - 175 ng/dL  93    Hemoglobin A1c      3.5 - 6.0 %  5.1          Review of Systems:    Nervous System: No headache, dizziness, fainting or memory loss. No tingling sensation of hand or feet.  Ears: No hearing loss or ringing in the ears  Eyes: No blurring of vision, redness, itching or dryness.  Nose: No nosebleed or loss of smell  Mouth: No mouth sores or loss of taste  Throat: No hoarseness or difficulty swallowing  Neck: No enlarged thyroid or lymph nodes.  Heart: No chest pain, palpitation or irregular heartbeat. No swelling of hands or feet  Lungs: No shortness of breath, cough, night sweats, wheezing or hemoptysis.  Gastrointestinal: No nausea or vomiting, constipation or diarrhea.  No acid reflux, abdominal pain or blood in stools.  Kidney/Bladdr: No polyuria, polydipsia, nocturia or hematuria.  Genital/Sexual: No loss of libido  Skin: No rash, hair loss or hirsutism.  No abnormal striae  Muscles/Joints/Bones: No morning stiffness, muscle aches and pain or loss of height.    Current Medications:  Current Outpatient Medications   Medication Sig     albuterol (PROVENTIL HFA;VENTOLIN HFA) 90 mcg/actuation inhaler Inhale 2 puffs every 6 (six) hours as needed for wheezing.     citalopram (CELEXA) 20 MG tablet Take 1 tablet (20 mg total) by mouth daily.     ibuprofen (ADVIL,MOTRIN) 600 MG tablet Take 1 tablet (600 mg total) by mouth every 6 (six) hours as needed for pain.     levothyroxine (SYNTHROID, LEVOTHROID) 200 MCG tablet TAKE ONE TABLET BY MOUTH ONCE DAILY AT 6:00 AM.     levothyroxine (SYNTHROID, LEVOTHROID) 200 MCG tablet TAKE 1 TABLET BY MOUTH EVERY DAY AT 6 AM     ondansetron (ZOFRAN) 4 MG tablet Take 1 tablet (4 mg total) by mouth every 6 (six) hours.     triamcinolone (KENALOG) 0.5 % cream A thin layer to affected area(s)       Patients Active Problems:  Patient Active Problem List   Diagnosis     Eczema     Postsurgical Hypothyroidism     Major Depression, Recurrent      Asthma     BMI 36.0-36.9,adult     Papillary Carcinoma Of The Thyroid Gland     Depression with anxiety     Epigastric pain     Elevated bilirubin     Elevated alkaline phosphatase level     Elevated LFTs     GERD (gastroesophageal reflux disease)     Cough       History:   reports that  has never smoked. she has never used smokeless tobacco. She reports that she does not use drugs.   reports that  has never smoked. she has never used smokeless tobacco. She reports that she does not use drugs. Her alcohol history is not on file.  Social History     Tobacco Use   Smoking Status Never Smoker   Smokeless Tobacco Never Used      reports that  has never smoked. she has never used smokeless tobacco. She reports that she does not use drugs. Her alcohol history is not on file.  Social History     Substance and Sexual Activity   Sexual Activity Not on file     Past Medical History:   Diagnosis Date     Anxiety      Asthma      Depression      Disease of thyroid gland      Eczema      Obese      Thyroid cancer (H)      Family History   Problem Relation Age of Onset     Heart disease Maternal Grandmother          in her 80's     Past Medical History:   Diagnosis Date     Anxiety      Asthma      Depression      Disease of thyroid gland      Eczema      Obese      Thyroid cancer (H)      Past Surgical History:   Procedure Laterality Date     IL REMOVAL GALLBLADDER      Description: Cholecystectomy;  Recorded: 2010;  Comments:      IL THYROIDECTOMY      Thyroid Surgery Total Thyroidectomy;  Papillary thyroid cancer       Vitals   vitals were not taken for this visit.         Exam  General appearance: The patient looked well, not in acute distress.  Eyes: no evidence of thyroid eye disease.   Retinal exam: No evidence of diabetic retinopathy.  Mouth and Throat: Normal  Neck: No evidence of thyromegaly, enlarged lymph node or tenderness  Chest: Trachea is central. Chest is clear to auscultation and  percussion. Breat sounds are normal.  Cardiovascular exam: JVP is not raised. Heart sounds are normal, no murmurs or rub  Peripheral pulses are palpable.   Abdomen: No masses or tenderness.    Back: No vertebral tenderness or kyphosis.  Extremities: No evidence of leg edema.   Skin: Normal to touch.  No abnormal striae  Neurologic exam:  Visual fields are intact by confrontation, grossly intact. No evidence of peripheral neuropathy.  Detailed foot exam normal.        Diagnosis:  No diagnosis found.    Orders:   No orders of the defined types were placed in this encounter.        Assessment and Plan: This patient is here for follow-up because of papillary thyroid cancer.  I have not seen this patient over 2 years.    She had thyroid cancer back in 2002 and received radioactive iodine twice before recurrence.    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    She is  but no children.    She takes Synthroid 200 mcg daily thyroid exam is normal last ultrasound in was 2 years ago and it was clear we will repeat the ultrasound.    The TSH is remaining suppressed at 0.05 free T4 1.5-3393 thyroid globin tumor marker is undetectable which is very good prognosis.    She was admitted to hospital back in September because of gastroenteritis sodium was low potassium was low.    She said that proton pump inhibitors is not really helpful.    She takes Celexa 20 mg that may be contributing to her SI ADH and hyponatremia.    Blood pressure 130/84.    I did advise her to take vitamin D 2000 units a day we will do ultrasound and see her again in 1 year    She had prediabetes A1c 5.1 that showing very good level without metformin so we will stay off it.    I have reviewed and ordered clinical lab test    I have reviewed and ordered her medication as required.    I have reviewed her test results and advised with the performing  physician.    I have reviewed the patient's old records.    I have reviewed and summarized the patient old records.

## 2021-06-23 NOTE — PATIENT INSTRUCTIONS - HE
It is my pleasure seeing you in the clinic today.      Please renew prescription for Synthroid for 1 year.    Please do thyroid ultrasound.    Follow-up in 1 year check creatinine potassium calcium 25-hydroxy vitamin D TSH free T4 thyroglobulin tumor marker before next visit.            Thank you for allowing me to participate in your care.        We work very hard to achieve the best quality of care.  We would be very grateful if you complete any survey you receive regarding this visit, so that we continue to improve our care.

## 2021-06-25 NOTE — OR NURSING
Update per review by Dr. Morales:    RE: AA: Cough/Asthma flare  Received: Today  Message Contents   Rodney Morales MD Dalebroux, Deborah E, RN; [facility]; Vy Waterman, JACQUELYN             Thanks Honey   We will give her a call the day before   Rodney

## 2021-06-25 NOTE — OR NURSING
AA: Cough/Asthma flare  Received: Today  Message Contents   Vickie Pittman RN  [facility]; Rodney Morales MD; Vy Waterman RN             Dear Anesthesia team & Dr. Morales,     I just completed the PreOp phone call for this patient - during the call I noted that she had repeated coughing fits with noticeable dyspnea. Pt states that her asthma is poorly controlled at present and she has not been using her inhaler for the last 2 days. She states her breathing has been much worse since her recent hospitalization in late May. I confirmed she has Albuterol and Flovent, and advised the patient to use her inhalers as directed immediately after concluding the call. 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     I explained to the patient that we are concerned with her safety if asthma symptoms are not well controlled prior to the procedure, and encouraged her to set a reminder/alarm, have a family member 'willy', or leave herself a note to be sure she is using the asthma medications regularly between now and her procedure on Tuesday. I also instructed her to be sure to use both inhalers the morning of the procedure, 6/29/21, and to bring the Albuterol inhaler with her to PreOp DOS.     Pt states understanding and agrees with plan. Patient states she is scheduled to have a COVID test this afternoon at the [facility] near her home in [place] since she is unable to make the appt scheduled tomorrow.     Thank you for your review,     Honey Pittman RN   PreAdmission Screening   [facility]

## 2021-06-26 ENCOUNTER — AMBULATORY - HEALTHEAST (OUTPATIENT)
Dept: FAMILY MEDICINE | Facility: CLINIC | Age: 46
End: 2021-06-26

## 2021-06-26 DIAGNOSIS — Z11.59 ENCOUNTER FOR SCREENING FOR OTHER VIRAL DISEASES: ICD-10-CM

## 2021-06-26 NOTE — PATIENT INSTRUCTIONS - HE
Dear Dayan Madison,    We are sorry you are not feeling well. Based on the responses you provided, it is recommended that you be seen in-person in urgent care so we can better evaluate your symptoms. Please click Here to find the nearest urgent care location to you.   You will not be charged for this Visit. Thank you for trusting us with your care.    Levi Quezada Jr, MD

## 2021-06-26 NOTE — PROGRESS NOTES
Progress Notes by Shnaice Argueta NP at 6/21/2018 12:20 PM     Author: Shanice Argueta NP Service: -- Author Type: Nurse Practitioner    Filed: 6/21/2018  2:33 PM Encounter Date: 6/21/2018 Status: Signed    : Shanice Argueta NP (Nurse Practitioner)       1. Cough  predniSONE (DELTASONE) 10 mg tablet   2. Asthma  predniSONE (DELTASONE) 10 mg tablet         ASSESSMENT/PLAN:     The following high BMI interventions were performed this visit: encouragement to exercise and weight monitoring    1. Cough    - predniSONE (DELTASONE) 10 mg tablet; Take 1 tablet (10 mg total) by mouth daily.  Dispense: 30 tablet; Refill: 0    2. Asthma    - predniSONE (DELTASONE) 10 mg tablet; Take 1 tablet (10 mg total) by mouth daily.  Dispense: 30 tablet; Refill: 0    Patient Instructions     Viral Upper Respiratory Illness with Wheezing (Adult)  You have a viral upper respiratory illness (URI), which is another term for the common cold. When the infection causes a lot of irritation, the air passages can go into spasm. This causes wheezing and shortness of breath.    This illness is contagious during the first few days. It is spread through the air by coughing and sneezing. It may also be spread by direct contact. This could be by touching the sick person and then touching your own eyes, nose, or mouth. Frequent handwashing will decrease the risk.  Most viral illnesses go away within 7 to 10 days with rest and simple home remedies. Sometimes the illness may last for several weeks. Antibiotics will not kill a virus, and they are generally not prescribed for this condition.  Home care    If symptoms are severe, rest at home for the first 2 to 3 days. When you resume activity, don't let yourself get too tired.    Stay away from cigarette smoke.    You may use acetaminophen or ibuprofen to control pain and fever, unless another medicine was prescribed. If you have chronic liver or kidney disease, have ever had a stomach  ulcer or gastrointestinal bleeding, or are taking blood-thinning medicines, talk with your healthcare provider before using these medicines. Aspirin should never be given to anyone under 18 years of age who is ill with a viral infection or fever. It may cause severe liver or brain damage.    Your appetite may be poor, so a light diet is fine. Avoid dehydration by drinking 6 to 8 glasses of fluids per day (water, soft drinks, juices, tea, or soup). Extra fluids will help loosen secretions in the nose and lungs.    Over-the-counter cold medicines will not shorten the length of time youre sick, but they may be helpful for the following symptoms: cough, sore throat, and nasal and sinus congestion. Don't use decongestants if you have high blood pressure.  Follow-up care  Follow up with your healthcare provider, or as advised.  When to seek medical advice  Call your healthcare provider right away if any of these occur:    Cough with lots of colored sputum (mucus)    Severe headache; face, neck, or ear pain    Difficulty swallowing due to throat pain    Fever of 100.4 F (38 C) or higher, or as directed by your healthcare provider  Call 911  Call 911 if any of these occur:    Chest pain, shortness of breath, worsening wheezing, or difficulty breathing    Coughing up blood    Can't swallow because of throat pain  Date Last Reviewed: 9/13/2015 2000-2017 [employer]. [address] [place] [address]. All rights reserved. This information is not intended as a substitute for professional medical care. Always follow your healthcare professional's instructions.          Medications Discontinued During This Encounter   Medication Reason   ? sucralfate (CARAFATE) 1 gram tablet Therapy completed   ? omeprazole (PRILOSEC) 20 MG capsule Therapy completed   ? levothyroxine (SYNTHROID, LEVOTHROID) 200 MCG tablet Duplicate order   ? alum/mag hydrox-simethicone-diphenhydramine-lidocaine (MAGIC MOUTHWASH)  suspension Therapy completed   ? VENTOLIN HFA 90 mcg/actuation inhaler Duplicate order     Return if symptoms worsen or fail to improve, for URI symptoms.    The visit lasted a total of 25 minutes face to face with the patient.  Over 50% of the time spent counseling and educating the patient about above content.      Shanice Argueta NP          SUBJECTIVE:  Dayan Madison is a 43 y.o. female who presents for evaluation of her cough that started 1 month ago.  Her cough has been dry, she does have a history of intermittent mild asthma. Ost recent ACT score on file: 25. No hospitalizations in the last 12 months related to her asthma.  She has been feeling tightness in the chest since her cough began.  She states the cough has been constant and she describes it as a heavy sensation especially when she is active.  She did try her albuterol inhaler for several days but felt that the tightness and the shortness of breath was still present, mild in severity however.  The albuterol did help with her cough but since she stopped using the inhaler, the cough has been more persistent.  She denies any pain, fevers, chills, body aches or loss of appetite today.  She has been exposed to viral illness at work, she does not smoke and there has been no recent travel.  Her vital signs are stable today, she is afebrile.  Patient placed on prednisone taper for 12 days for acute asthma flare due to recent viral infection, she declined prescription cough suppressant today.  She will return to the clinic if her symptoms persist or become severe upon completion of the prednisone taper.  All of her concerns were addressed today.  She agrees with plan of care.  Chief Complaint   Patient presents with   ? Cough     Persisent cough x 1 month - some nasal congestion and runny nose         Patient Active Problem List   Diagnosis   ? Eczema   ? Postsurgical Hypothyroidism   ? Major Depression, Recurrent   ? Asthma   ? BMI 36.0-36.9,adult   ?  Papillary Carcinoma Of The Thyroid Gland   ? Depression with anxiety   ? Epigastric pain   ? Elevated bilirubin   ? Elevated alkaline phosphatase level   ? Elevated LFTs   ? GERD (gastroesophageal reflux disease)   ? Cough       Current Outpatient Prescriptions   Medication Sig Dispense Refill   ? albuterol (PROVENTIL HFA;VENTOLIN HFA) 90 mcg/actuation inhaler Inhale 2 puffs every 6 (six) hours as needed for wheezing.     ? citalopram (CELEXA) 20 MG tablet TAKE 1 TABLET BY MOUTH EVERY DAY 90 tablet 2   ? ibuprofen (ADVIL,MOTRIN) 600 MG tablet Take 1 tablet (600 mg total) by mouth every 6 (six) hours as needed for pain. 30 tablet 0   ? levothyroxine (SYNTHROID, LEVOTHROID) 200 MCG tablet TAKE ONE TABLET BY MOUTH ONCE DAILY AT 6:00 AM. 90 tablet 3   ? triamcinolone (KENALOG) 0.5 % cream A thin layer to affected area(s) 30 g 2   ? predniSONE (DELTASONE) 10 mg tablet Take 1 tablet (10 mg total) by mouth daily. 30 tablet 0     No current facility-administered medications for this visit.        History   Smoking Status   ? Never Smoker   Smokeless Tobacco   ? Never Used       REVIEW OF SYSTEMS: Denies fever/chills/sore throat/rhinorrhea/ear pain/swollen glands/abdominal pain/changes in bowel habits/urinary symptoms/rash.      TOBACCO USE:  History   Smoking Status   ? Never Smoker   Smokeless Tobacco   ? Never Used     Social History     Social History   ? Marital status:      Spouse name: N/A   ? Number of children: N/A   ? Years of education: N/A     Occupational History   ? Not on file.     Social History Main Topics   ? Smoking status: Never Smoker   ? Smokeless tobacco: Never Used   ? Alcohol use Not on file   ? Drug use: No   ? Sexual activity: Not on file     Other Topics Concern   ? Not on file     Social History Narrative         OBJECTIVE:            Vitals:    06/21/18 1235   BP: 110/72   Pulse: 86   Resp: 18   Temp: 98.3  F (36.8  C)   SpO2: 98%     Weight: 204 lb (92.5 kg)  Wt Readings from Last 3  Encounters:   06/21/18 204 lb (92.5 kg)   01/23/18 202 lb (91.6 kg)   11/03/17 196 lb (88.9 kg)     Body mass index is 35.02 kg/(m^2).        Physical Exam:  GENERAL APPEARANCE: A&A, NAD, well hydrated, well nourished  HEAD: atraumatic, no deformity, no sinus pain  EYES: PERRL, EOM's intact, no redness or swelling  EARS: TM's normal, gray with nl light reflex  NOSE: no post nasal drainage or thrush  MOUTH: without erythema, exudate or thrush  NECK: Supple, without lymphadenopathy, no thyroid mass, no JVD, no bruit  CV: RRR, no M/G/R, no edema   LUNGS: Faint, expiratory wheezes in bilateral lower lower lobes, upper lobes are clear, normal respiratory effort  ABDOMEN: S&NT, no masses, no organomegaly, BS present x4    SKIN:  Normal skin turgor, no lesions/rashes, warm and dry

## 2021-06-28 ENCOUNTER — PATIENT OUTREACH (OUTPATIENT)
Dept: GASTROENTEROLOGY | Facility: CLINIC | Age: 46
End: 2021-06-28

## 2021-06-28 NOTE — TELEPHONE ENCOUNTER
Called to check on patient asthma symptoms on advanced of her procedure tomorrow.  Left message.    ML

## 2021-06-29 ENCOUNTER — ANESTHESIA (OUTPATIENT)
Dept: SURGERY | Facility: CLINIC | Age: 46
End: 2021-06-29
Payer: COMMERCIAL

## 2021-06-29 ENCOUNTER — APPOINTMENT (OUTPATIENT)
Dept: GENERAL RADIOLOGY | Facility: CLINIC | Age: 46
End: 2021-06-29
Attending: INTERNAL MEDICINE
Payer: COMMERCIAL

## 2021-06-29 ENCOUNTER — ANESTHESIA EVENT (OUTPATIENT)
Dept: SURGERY | Facility: CLINIC | Age: 46
End: 2021-06-29
Payer: COMMERCIAL

## 2021-06-29 ENCOUNTER — HOSPITAL ENCOUNTER (OUTPATIENT)
Facility: CLINIC | Age: 46
Discharge: HOME | End: 2021-06-29
Attending: INTERNAL MEDICINE | Admitting: INTERNAL MEDICINE
Payer: COMMERCIAL

## 2021-06-29 VITALS
DIASTOLIC BLOOD PRESSURE: 76 MMHG | OXYGEN SATURATION: 98 % | SYSTOLIC BLOOD PRESSURE: 146 MMHG | HEART RATE: 55 BPM | RESPIRATION RATE: 16 BRPM | TEMPERATURE: 98.2 F | WEIGHT: 205.91 LBS | BODY MASS INDEX: 35.15 KG/M2 | HEIGHT: 64 IN

## 2021-06-29 DIAGNOSIS — K83.8 DILATION OF BILIARY TRACT: ICD-10-CM

## 2021-06-29 LAB
ALBUMIN SERPL-MCNC: 3.2 G/DL (ref 3.4–5)
ALP SERPL-CCNC: 125 U/L (ref 40–150)
ALT SERPL W P-5'-P-CCNC: 30 U/L (ref 0–50)
AMYLASE SERPL-CCNC: 33 U/L (ref 30–110)
ANION GAP SERPL CALCULATED.3IONS-SCNC: 5 MMOL/L (ref 3–14)
AST SERPL W P-5'-P-CCNC: 16 U/L (ref 0–45)
BILIRUB SERPL-MCNC: 0.7 MG/DL (ref 0.2–1.3)
BUN SERPL-MCNC: 15 MG/DL (ref 7–30)
CALCIUM SERPL-MCNC: 9.2 MG/DL (ref 8.5–10.1)
CHLORIDE SERPL-SCNC: 105 MMOL/L (ref 94–109)
CO2 SERPL-SCNC: 28 MMOL/L (ref 20–32)
CREAT SERPL-MCNC: 0.7 MG/DL (ref 0.52–1.04)
ERCP: NORMAL
ERYTHROCYTE [DISTWIDTH] IN BLOOD BY AUTOMATED COUNT: 11.8 % (ref 10–15)
GFR SERPL CREATININE-BSD FRML MDRD: >90 ML/MIN/{1.73_M2}
GLUCOSE BLDC GLUCOMTR-MCNC: 95 MG/DL (ref 70–99)
GLUCOSE SERPL-MCNC: 88 MG/DL (ref 70–99)
HCG UR QL: NEGATIVE
HCT VFR BLD AUTO: 39.1 % (ref 35–47)
HGB BLD-MCNC: 12.9 G/DL (ref 11.7–15.7)
INR PPP: 0.99 (ref 0.86–1.14)
LIPASE SERPL-CCNC: 156 U/L (ref 73–393)
MCH RBC QN AUTO: 27.8 PG (ref 26.5–33)
MCHC RBC AUTO-ENTMCNC: 33 G/DL (ref 31.5–36.5)
MCV RBC AUTO: 84 FL (ref 78–100)
PLATELET # BLD AUTO: 364 10E9/L (ref 150–450)
POTASSIUM SERPL-SCNC: 3.5 MMOL/L (ref 3.4–5.3)
PROT SERPL-MCNC: 7.8 G/DL (ref 6.8–8.8)
RBC # BLD AUTO: 4.64 10E12/L (ref 3.8–5.2)
SODIUM SERPL-SCNC: 138 MMOL/L (ref 133–144)
WBC # BLD AUTO: 8.1 10E9/L (ref 4–11)

## 2021-06-29 PROCEDURE — 81025 URINE PREGNANCY TEST: CPT | Performed by: ANESTHESIOLOGY

## 2021-06-29 PROCEDURE — 36415 COLL VENOUS BLD VENIPUNCTURE: CPT | Performed by: STUDENT IN AN ORGANIZED HEALTH CARE EDUCATION/TRAINING PROGRAM

## 2021-06-29 PROCEDURE — 82150 ASSAY OF AMYLASE: CPT | Performed by: STUDENT IN AN ORGANIZED HEALTH CARE EDUCATION/TRAINING PROGRAM

## 2021-06-29 PROCEDURE — 360N000084 HC SURGERY LEVEL 4 W/ FLUORO, PER MIN: Performed by: INTERNAL MEDICINE

## 2021-06-29 PROCEDURE — 250N000011 HC RX IP 250 OP 636: Performed by: NURSE ANESTHETIST, CERTIFIED REGISTERED

## 2021-06-29 PROCEDURE — 250N000011 HC RX IP 250 OP 636: Performed by: ANESTHESIOLOGY

## 2021-06-29 PROCEDURE — 258N000003 HC RX IP 258 OP 636: Performed by: NURSE ANESTHETIST, CERTIFIED REGISTERED

## 2021-06-29 PROCEDURE — 250N000009 HC RX 250: Performed by: NURSE ANESTHETIST, CERTIFIED REGISTERED

## 2021-06-29 PROCEDURE — 88305 TISSUE EXAM BY PATHOLOGIST: CPT | Mod: 26 | Performed by: PATHOLOGY

## 2021-06-29 PROCEDURE — 250N000009 HC RX 250: Performed by: STUDENT IN AN ORGANIZED HEALTH CARE EDUCATION/TRAINING PROGRAM

## 2021-06-29 PROCEDURE — C1769 GUIDE WIRE: HCPCS | Performed by: INTERNAL MEDICINE

## 2021-06-29 PROCEDURE — 88305 TISSUE EXAM BY PATHOLOGIST: CPT | Mod: TC | Performed by: INTERNAL MEDICINE

## 2021-06-29 PROCEDURE — 80053 COMPREHEN METABOLIC PANEL: CPT | Performed by: STUDENT IN AN ORGANIZED HEALTH CARE EDUCATION/TRAINING PROGRAM

## 2021-06-29 PROCEDURE — 272N000002 HC OR SUPPLY OTHER OPNP: Performed by: INTERNAL MEDICINE

## 2021-06-29 PROCEDURE — 710N000009 HC RECOVERY PHASE 1, LEVEL 1, PER MIN: Performed by: INTERNAL MEDICINE

## 2021-06-29 PROCEDURE — 83690 ASSAY OF LIPASE: CPT | Performed by: STUDENT IN AN ORGANIZED HEALTH CARE EDUCATION/TRAINING PROGRAM

## 2021-06-29 PROCEDURE — 255N000002 HC RX 255 OP 636: Performed by: INTERNAL MEDICINE

## 2021-06-29 PROCEDURE — 999N000179 XR SURGERY CARM FLUORO LESS THAN 5 MIN W STILLS: Mod: TC

## 2021-06-29 PROCEDURE — 258N000003 HC RX IP 258 OP 636: Performed by: ANESTHESIOLOGY

## 2021-06-29 PROCEDURE — 272N000001 HC OR GENERAL SUPPLY STERILE: Performed by: INTERNAL MEDICINE

## 2021-06-29 PROCEDURE — 999N000141 HC STATISTIC PRE-PROCEDURE NURSING ASSESSMENT: Performed by: INTERNAL MEDICINE

## 2021-06-29 PROCEDURE — 82962 GLUCOSE BLOOD TEST: CPT

## 2021-06-29 PROCEDURE — 85610 PROTHROMBIN TIME: CPT | Performed by: STUDENT IN AN ORGANIZED HEALTH CARE EDUCATION/TRAINING PROGRAM

## 2021-06-29 PROCEDURE — C1726 CATH, BAL DIL, NON-VASCULAR: HCPCS | Performed by: INTERNAL MEDICINE

## 2021-06-29 PROCEDURE — 250N000025 HC SEVOFLURANE, PER MIN: Performed by: INTERNAL MEDICINE

## 2021-06-29 PROCEDURE — 250N000009 HC RX 250: Performed by: INTERNAL MEDICINE

## 2021-06-29 PROCEDURE — 370N000017 HC ANESTHESIA TECHNICAL FEE, PER MIN: Performed by: INTERNAL MEDICINE

## 2021-06-29 PROCEDURE — 85027 COMPLETE CBC AUTOMATED: CPT | Performed by: STUDENT IN AN ORGANIZED HEALTH CARE EDUCATION/TRAINING PROGRAM

## 2021-06-29 PROCEDURE — 710N000012 HC RECOVERY PHASE 2, PER MINUTE: Performed by: INTERNAL MEDICINE

## 2021-06-29 PROCEDURE — C1877 STENT, NON-COAT/COV W/O DEL: HCPCS | Performed by: INTERNAL MEDICINE

## 2021-06-29 DEVICE — STENT ZIMMON PANCREA 7FRX12CM SGL PIGTAIL G22356: Type: IMPLANTABLE DEVICE | Site: MOUTH | Status: FUNCTIONAL

## 2021-06-29 RX ORDER — DEXAMETHASONE SODIUM PHOSPHATE 4 MG/ML
INJECTION, SOLUTION INTRA-ARTICULAR; INTRALESIONAL; INTRAMUSCULAR; INTRAVENOUS; SOFT TISSUE PRN
Status: DISCONTINUED | OUTPATIENT
Start: 2021-06-29 | End: 2021-06-29

## 2021-06-29 RX ORDER — SODIUM CHLORIDE, SODIUM LACTATE, POTASSIUM CHLORIDE, CALCIUM CHLORIDE 600; 310; 30; 20 MG/100ML; MG/100ML; MG/100ML; MG/100ML
INJECTION, SOLUTION INTRAVENOUS CONTINUOUS
Status: DISCONTINUED | OUTPATIENT
Start: 2021-06-29 | End: 2021-06-29 | Stop reason: HOSPADM

## 2021-06-29 RX ORDER — IOPAMIDOL 510 MG/ML
INJECTION, SOLUTION INTRAVASCULAR PRN
Status: DISCONTINUED | OUTPATIENT
Start: 2021-06-29 | End: 2021-06-29 | Stop reason: HOSPADM

## 2021-06-29 RX ORDER — INDOMETHACIN 50 MG/1
100 SUPPOSITORY RECTAL
Status: COMPLETED | OUTPATIENT
Start: 2021-06-29 | End: 2021-06-29

## 2021-06-29 RX ORDER — HYDROMORPHONE HYDROCHLORIDE 1 MG/ML
.3-.5 INJECTION, SOLUTION INTRAMUSCULAR; INTRAVENOUS; SUBCUTANEOUS EVERY 10 MIN PRN
Status: DISCONTINUED | OUTPATIENT
Start: 2021-06-29 | End: 2021-06-29 | Stop reason: HOSPADM

## 2021-06-29 RX ORDER — ONDANSETRON 4 MG/1
4 TABLET, ORALLY DISINTEGRATING ORAL EVERY 30 MIN PRN
Status: DISCONTINUED | OUTPATIENT
Start: 2021-06-29 | End: 2021-06-29 | Stop reason: HOSPADM

## 2021-06-29 RX ORDER — FENTANYL CITRATE 50 UG/ML
INJECTION, SOLUTION INTRAMUSCULAR; INTRAVENOUS PRN
Status: DISCONTINUED | OUTPATIENT
Start: 2021-06-29 | End: 2021-06-29

## 2021-06-29 RX ORDER — NALOXONE HYDROCHLORIDE 0.4 MG/ML
0.4 INJECTION, SOLUTION INTRAMUSCULAR; INTRAVENOUS; SUBCUTANEOUS
Status: DISCONTINUED | OUTPATIENT
Start: 2021-06-29 | End: 2021-06-29 | Stop reason: HOSPADM

## 2021-06-29 RX ORDER — FENTANYL CITRATE 50 UG/ML
25-50 INJECTION, SOLUTION INTRAMUSCULAR; INTRAVENOUS
Status: DISCONTINUED | OUTPATIENT
Start: 2021-06-29 | End: 2021-06-29 | Stop reason: HOSPADM

## 2021-06-29 RX ORDER — ONDANSETRON 2 MG/ML
INJECTION INTRAMUSCULAR; INTRAVENOUS PRN
Status: DISCONTINUED | OUTPATIENT
Start: 2021-06-29 | End: 2021-06-29

## 2021-06-29 RX ORDER — PROPOFOL 10 MG/ML
INJECTION, EMULSION INTRAVENOUS PRN
Status: DISCONTINUED | OUTPATIENT
Start: 2021-06-29 | End: 2021-06-29

## 2021-06-29 RX ORDER — CIPROFLOXACIN 2 MG/ML
INJECTION, SOLUTION INTRAVENOUS PRN
Status: DISCONTINUED | OUTPATIENT
Start: 2021-06-29 | End: 2021-06-29

## 2021-06-29 RX ORDER — NALOXONE HYDROCHLORIDE 0.4 MG/ML
0.2 INJECTION, SOLUTION INTRAMUSCULAR; INTRAVENOUS; SUBCUTANEOUS
Status: DISCONTINUED | OUTPATIENT
Start: 2021-06-29 | End: 2021-06-29 | Stop reason: HOSPADM

## 2021-06-29 RX ORDER — LIDOCAINE HYDROCHLORIDE 20 MG/ML
INJECTION, SOLUTION INFILTRATION; PERINEURAL PRN
Status: DISCONTINUED | OUTPATIENT
Start: 2021-06-29 | End: 2021-06-29

## 2021-06-29 RX ORDER — MEPERIDINE HYDROCHLORIDE 25 MG/ML
12.5 INJECTION INTRAMUSCULAR; INTRAVENOUS; SUBCUTANEOUS
Status: DISCONTINUED | OUTPATIENT
Start: 2021-06-29 | End: 2021-06-29 | Stop reason: HOSPADM

## 2021-06-29 RX ORDER — ONDANSETRON 2 MG/ML
4 INJECTION INTRAMUSCULAR; INTRAVENOUS EVERY 30 MIN PRN
Status: DISCONTINUED | OUTPATIENT
Start: 2021-06-29 | End: 2021-06-29 | Stop reason: HOSPADM

## 2021-06-29 RX ORDER — LIDOCAINE 40 MG/G
CREAM TOPICAL
Status: DISCONTINUED | OUTPATIENT
Start: 2021-06-29 | End: 2021-06-29 | Stop reason: HOSPADM

## 2021-06-29 RX ADMIN — PROPOFOL 120 MG: 10 INJECTION, EMULSION INTRAVENOUS at 14:07

## 2021-06-29 RX ADMIN — SODIUM CHLORIDE, POTASSIUM CHLORIDE, SODIUM LACTATE AND CALCIUM CHLORIDE: 600; 310; 30; 20 INJECTION, SOLUTION INTRAVENOUS at 14:00

## 2021-06-29 RX ADMIN — FENTANYL CITRATE 50 MCG: 50 INJECTION, SOLUTION INTRAMUSCULAR; INTRAVENOUS at 14:35

## 2021-06-29 RX ADMIN — FENTANYL CITRATE 50 MCG: 50 INJECTION, SOLUTION INTRAMUSCULAR; INTRAVENOUS at 14:07

## 2021-06-29 RX ADMIN — DEXAMETHASONE SODIUM PHOSPHATE 4 MG: 4 INJECTION, SOLUTION INTRA-ARTICULAR; INTRALESIONAL; INTRAMUSCULAR; INTRAVENOUS; SOFT TISSUE at 14:23

## 2021-06-29 RX ADMIN — ONDANSETRON 4 MG: 2 INJECTION INTRAMUSCULAR; INTRAVENOUS at 14:23

## 2021-06-29 RX ADMIN — LIDOCAINE HYDROCHLORIDE 100 MG: 20 INJECTION, SOLUTION INFILTRATION; PERINEURAL at 14:07

## 2021-06-29 RX ADMIN — ROCURONIUM BROMIDE 50 MG: 10 INJECTION INTRAVENOUS at 14:07

## 2021-06-29 RX ADMIN — PHENYLEPHRINE HYDROCHLORIDE 100 MCG: 10 INJECTION INTRAVENOUS at 14:32

## 2021-06-29 RX ADMIN — PHENYLEPHRINE HYDROCHLORIDE 100 MCG: 10 INJECTION INTRAVENOUS at 14:25

## 2021-06-29 RX ADMIN — MIDAZOLAM 2 MG: 1 INJECTION INTRAMUSCULAR; INTRAVENOUS at 14:04

## 2021-06-29 RX ADMIN — CIPROFLOXACIN 400 MG: 2 INJECTION INTRAVENOUS at 15:03

## 2021-06-29 RX ADMIN — PHENYLEPHRINE HYDROCHLORIDE 100 MCG: 10 INJECTION INTRAVENOUS at 15:06

## 2021-06-29 RX ADMIN — PROPOFOL 30 MG: 10 INJECTION, EMULSION INTRAVENOUS at 14:34

## 2021-06-29 RX ADMIN — SUGAMMADEX 200 MG: 100 INJECTION, SOLUTION INTRAVENOUS at 15:18

## 2021-06-29 RX ADMIN — FENTANYL CITRATE 25 MCG: 50 INJECTION, SOLUTION INTRAMUSCULAR; INTRAVENOUS at 15:48

## 2021-06-29 ASSESSMENT — MIFFLIN-ST. JEOR: SCORE: 1554

## 2021-06-29 NOTE — OP NOTE
ERCP 06/29/2021  1:31 PM [facility], [address] [place] [address] [phone]     Endoscopy Department   _______________________________________________________________________________   Patient Name: Dayan Madison          Procedure Date: 6/29/2021 1:31 PM   MRN: [id number]                       Account Number: [id number]   YOB: 1975              Admit Type: Outpatient   Age: 46                               Room: OR   Gender: Female                        Note Status: Finalized   Attending MD: Rodney Morales MD   Total Sedation Time:   _______________________________________________________________________________       Procedure:           ERCP   Indications:         Bile duct stricture   Providers:           Rodney Morales MD, David Lin MD   Patient Profile:     Ms Madison is a 45yo woman with a history of abdominal                        pain resolved with biliary sphincterotomy who had                        recurrent abdominal pain prompting another outside ERCP                        demonstrating an indeterminate stricture of the                        bifurcation. Imaging was without related mass. She now                        presents for repeat ERCP with cholangioscopy for further                        evaluation.   Referring MD:        Stephany Bell   Medicines:           General Anesthesia, Indomethacin 100 mg IL, Cipro 400 mg                        IV   Complications:       No immediate complications.   _______________________________________________________________________________   Procedure:           Pre-Anesthesia Assessment:                        - Prior to the procedure, a History and Physical was                        performed, and patient medications and allergies were                        reviewed. The patient is competent. The risks and                        benefits of the procedure and the sedation options and                         risks were discussed with the patient. All questions                        were answered and informed consent was obtained. Patient                        identification and proposed procedure were verified by                        the nurse in the pre-procedure area. Mental Status                        Examination: alert and oriented. Airway Examination:                        Mallampati Class II (the uvula but not tonsillar pillars                        visualized). Respiratory Examination: clear to                        auscultation. CV Examination: normal. ASA Grade                        Assessment: II - A patient with mild systemic disease.                        After reviewing the risks and benefits, the patient was                        deemed in satisfactory condition to undergo the                        procedure. The anesthesia plan was to use general                        anesthesia. Immediately prior to administration of                        medications, the patient was re-assessed for adequacy to                        receive sedatives. The heart rate, respiratory rate,                        oxygen saturations, blood pressure, adequacy of                        pulmonary ventilation, and response to care were                        monitored throughout the procedure. The physical status                        of the patient was re-assessed after the procedure.                        After obtaining informed consent, the scope was passed                        under direct vision. Throughout the procedure, the                        patient's blood pressure, pulse, and oxygen saturations                        were monitored continuously. The duodenoscope was                        introduced through the mouth, and used to inject                        contrast into and used to inject contrast into the bile                        duct. The ERCP was accomplished  "without difficulty. The                        patient tolerated the procedure well.                                                                                     Findings:        A  film of the right upper quadrant demonstrated cholecystectomy        clips and a biliary stent. Limited white light imaging of the foregut        was notable only for a biliary stent appropriately internalized across a        modest, patent biliary sphincterotomy and a pancreatic stent across the        pancreatic orifice. The two stents were removed from the biliary tree        and the pancreatic duct using a snare. The bile duct was selectively        deeply cannulated with the 12 mm balloon in concert with an 0.025\"        Visiglide wire. Contrast was injected and I personally interpreted the        bile duct images. Ductal flow of contrast was adequate, image quality        was excellent and contrast extended throughout the intrahepatics. The        intrahepatics were mildly centrall dilated upstream of a moderate to        mild stenosis at the base of the bifurcation. The common duct was        unremarkable. There were no overt filling defects. The biliary tree was        swept with a 12 mm balloon starting at the left intrahepatic duct(s) and        right intrahepatic duct(s). Debris and sludge was swept from the duct.        Next, the bile duct was explored endoscopically using the [employer]        direct visualization system. The SpyScope was advanced to the        bifurcation and visibility with the scope was excellent. The stenosis        was found and appeared as a benign thin, smooth cuff of tissue. The        stenosis was biopsied with a [place] miniature biopsy forceps for        histology. Next the stenosis was dilated to 6mm over the wire. A        modified 7 Fr by 12 cm transpapillary stent with a single external        pigtail and no internal flaps was placed 12 cm across the stenosis. Bile        flowed " through the stent and the stent was in good position. The ventral        pancreatic duct and orifice were selectively not interrogated.                                                                                     Impression:          - Uncomplicated removal of in situ pancreatic and                        biliary stents                        - Patent biliary sphincterotomy                        - Successful removal of small (likely stent related)                        stone debris                        - Benign appearing (on cholangioscopy) stenosis of the                        bifurcation near the site of a sharply angulated (normal                        variant) left main insertion, sampled during direct                        visualization                        - Uncomplicated dilation of the stenosis to 6mm and                        subsequent placement of a modified (to promote                        spontaneous ejection) plastic biliary stent across the                        stenosis   Recommendation:      - General anesthesia recovery with probable discharge                        home this afternoon                        - Follow up with your established providers as scheduled                        - Dr Morlaes to communicate the results of biopsies when                        available (we supsect these to return benign); this                        would prompt surveillance with a contrasted MRI/MRCP in                        3m (this may occur with established team or here)                        - All medications and diet may resume without delay                        - The findings and recommendations were discussed with                        the patient and their family                                                                                       electronically signed by JOSE RAMON Morales

## 2021-06-29 NOTE — ANESTHESIA POSTPROCEDURE EVALUATION
Patient: Dayan Madison    Procedure(s):  ENDOSCOPIC RETROGRADE CHOLANGIOPANCREATOGRAPHY, WITH DIRECT DUCT VISUALIZATION, USING PANCREATICOBILIARY FIBEROPTIC PROBE dilation, stent exchange    Diagnosis:Dilation of biliary tract [K83.8]  Diagnosis Additional Information: No value filed.    Anesthesia Type:  General    Note:  Disposition: Outpatient   Postop Pain Control: Uneventful            Sign Out: Well controlled pain   PONV: No   Neuro/Psych: Uneventful            Sign Out: Acceptable/Baseline neuro status   Airway/Respiratory: Uneventful            Sign Out: Acceptable/Baseline resp. status   CV/Hemodynamics: Uneventful            Sign Out: Acceptable CV status; No obvious hypovolemia; No obvious fluid overload   Other NRE: NONE   DID A NON-ROUTINE EVENT OCCUR? No           Last vitals:  Vitals:    06/29/21 1700 06/29/21 1715 06/29/21 1730   BP: (!) 141/92 (!) 148/93 (!) 146/76   Pulse: 66 59 55   Resp: 16 16 16   Temp:   36.8  C (98.2  F)   SpO2: 97% 97% 98%       Last vitals prior to Anesthesia Care Transfer:  CRNA VITALS  6/29/2021 1457 - 6/29/2021 1557      6/29/2021             Pulse:  81    SpO2:  96 %    Resp Rate (observed):  (!) 1          Electronically Signed By: Marcelo Carlos MD  June 29, 2021  5:38 PM

## 2021-06-29 NOTE — DISCHARGE INSTRUCTIONS
[facility], [place]  Same-Day Surgery ERCP Procedure   Adult Discharge Orders & Instructions     You had a procedure known as an Endoscopic Retrograde Cholangiopancreatography (ERCP). Your healthcare provider performed the ERCP to look at your bile or pancreatic ducts, and to locate and/or treat blockages (dilation, stenting, removal, etc.) in these ducts. Often biopsies, small samples of tissue, are taken to help diagnose and/or classify stages of disease growth. This procedure is used to diagnose diseases of the pancreas, bile ducts, pancreatic duct, liver, and gallbladder.     After your procedure   1. Make sure to clarify with your healthcare provider any diet restrictions (For example, clear liquid, low fat, no caffeine, etc.)   2. Do NOT take aspirin containing medications or any other blood-thinning medicines (anticoagulants) until your healthcare provider says it's OK.   3. You MAY be prescribed antibiotics, depending on what was done and/or found during your EUS, make sure to take antibiotics as prescribed by your healthcare provider    For 24 hours after surgery  1. Get plenty of rest.  A responsible adult must stay with you for at least 24 hours after you leave the hospital.   2. Do not drive or use heavy equipment.  If you have weakness or tingling, don't drive or use heavy equipment until this feeling goes away.  3. Do not drink alcohol.  4. Avoid strenuous or risky activities (gym, yoga, cycling, etc.).  Ask for help when climbing stairs.   5. You may feel lightheaded.  IF so, sit for a few minutes before standing.  Have someone help you get up.   6. If you have nausea (feel sick to your stomach): Drink only clear liquids such as apple juice, ginger ale, broth or 7-Up.  Rest may also help.  Be sure to drink enough fluids.  Move to a regular diet as you feel able.  7. If you feel bloated or have too much gas, use a heating pad on your belly to help reduce the discomfort.  This should help you feel better.   8. You may have a slight fever. This is normal for the first 24 hours.   9. You may have a dry mouth, a sore throat, muscle aches or trouble sleeping.  These are normal and will go away after 24 hours. A sore throat is most common. Use lozenges or gargle with salt water to ease the discomfort.   10. Do not make important or legal decisions.      Call your doctor for any of the followin. Chest pain, and/or shortness of breath  2. Abdominal  pain, bloating or cramping that has not improved or does not respond to pain reliving medications (Tylenol or narcotics if prescribed)   3. Difficulty swallowing or feeling as though food or liquids are stuck in your throat   4. Sore throat lasting more than 2 days or pain that has worsened over time   5. Black or tarry stools   6. Nausea and/or vomiting that is not resolving or has not responded to anti-nausea medications prescribed to you   7. It has been over 8 to 10 hours since surgery and you are still not able to urinate (pass water)   8. Headache for over 24 hours   9. Fever over 100.5 F (38 C) lasting more than 24 hours after the procedure   10. Signs of jaundice or blockage (fever, chills, abdominal pain, yellowing of the whites of your eyes, yellowing of your skin, and/or passing darker than normal urine)     To contact a doctor, call:   [ ] [phone] (Monday thru Friday 8:00am to 4:30pm)   [ ] [phone] and ask for the GI resident on call (answered 24 hours a day)   [ ] Emergency Department: [facility]: [phone]     Take it easy when you get home.  Remember, same day surgery DOES NOT MEAN SAME DAY RECOVERY!  Healing is a gradual process.  You will need some time to recover - you may be more tired than you realize at first.  Rest and relax for at least the first 24 hours at home.  You'll feel better and heal faster if you take good care of yourself.

## 2021-06-29 NOTE — LETTER
Patient:  Sona Frausto  :   1975  MRN:     [id number]        Ms.Jennifer HEATHER Frausto  [address] [address]        2021    Dear ,    We are writing to inform you of your test results. In short, great news. As anticipated, the sampling of the bile duct stenosis returned without any concerning finding, suggesting that the narrowing is benign (perhaps from stones). As discussed previously, our plan includes ensuring the stent migrates spontaneously out of the ducts (per stool) and a surveillance MRI/MRCP in .    I have included the formal documtentation of the results below. It continues to be a pleasure participating in your care.  Please feel free to contact our clinic with any further questions.      Sincerely,    Rodney Fritz MD PhD CODYG NAHUN MOISE  Director of Endoscopy  Associate Professor of Medicine, Surgery and Pediatrics  Interventional and Therapeutic Endoscopy    [facility]  Division of Gastroenterology and Hepatology  [facility] [address] [address]    New Consultations  [phone]  Procedure Scheduling [phone]  Clinical Nurse Coordinator [phone]  Clinical Fax   [phone]  Administrative   [phone]  Administrative Fax  [phone]        Resulted Orders   Surgical pathology exam   Result Value Ref Range    Copath Report       Patient Name: SONA FRAUSTO  MR#: [id number]  Specimen #: [id number]  Collected: 2021  Received: 2021  Reported: 2021 12:43  Ordering Phy(s): RODNEY FRITZ    For improved result formatting, select 'View Enhanced Report Format' under   Linked Documents section.    SPECIMEN(S):  Bile duct bifurcation strictures    FINAL DIAGNOSIS:  Bile Duct Bifurcation Strictures; Biopsy:  Benign, reactive biliary epithelium; no dysplasia or malignancy identified    I have personally reviewed all specimens and/or slides, including the  "  listed special stains, and used them  with my medical judgement to determine or confirm the final diagnosis.    Electronically signed out by:    Livier Leon M.D., [facility], Physicians    CLINICAL HISTORY:  Patient is a 46-year-old woman with bile duct stricture.    GROSS:  The specimen is received in formalin with proper patient identification,   labeled \"bifurcation strictures\".  The specimen consists of four pieces of pink-tan soft tissue ra nging in   size from 0.1-0.2 cm in greatest  dimension, which are wrapped and entirely submitted in cassette A1.   (Dictated by: Tonia Pereyra 6/29/2021  04:32 PM)    MICROSCOPIC:  Microscopic examination has been performed.    The technical component of this testing was completed at the [facility], with the professional component performed   at the [facility], [address] [place] [phone] ([phone])    CPT Codes:  A: 19621-YG3    COLLECTION SITE:  Client: [facility]  Location: SEN (B)               "

## 2021-06-29 NOTE — ANESTHESIA PREPROCEDURE EVALUATION
Anesthesia Pre-Procedure Evaluation    Patient: Dayan Madison   MRN: [id number] : 1975        Preoperative Diagnosis: Dilation of biliary tract [K83.8]   Procedure : Procedure(s):  ENDOSCOPIC RETROGRADE CHOLANGIOPANCREATOGRAPHY, WITH DIRECT DUCT VISUALIZATION, USING PANCREATICOBILIARY FIBEROPTIC PROBE     Past Medical History:   Diagnosis Date     Gastroesophageal reflux disease      Uncomplicated asthma       History reviewed. No pertinent surgical history.   No Known Allergies   Social History     Tobacco Use     Smoking status: Not on file   Substance Use Topics     Alcohol use: Not on file      Wt Readings from Last 1 Encounters:   21 93.4 kg (205 lb 14.6 oz)        Anesthesia Evaluation   Pt has had prior anesthetic. Type: General and MAC.    No history of anesthetic complications       ROS/MED HX  ENT/Pulmonary:     (+) Intermittent, asthma Treatment: Inhaler prn,      Neurologic:  - neg neurologic ROS     Cardiovascular:  - neg cardiovascular ROS  (-) murmur   METS/Exercise Tolerance: 4 - Raking leaves, gardening    Hematologic:  - neg hematologic  ROS     Musculoskeletal:  - neg musculoskeletal ROS     GI/Hepatic: Comment: Patient reports pain is not true GERD but more gastritis type    (+) GERD, Asymptomatic on medication,     Renal/Genitourinary:  - neg Renal ROS     Endo:  - neg endo ROS     Psychiatric/Substance Use:  - neg psychiatric ROS     Infectious Disease:  - neg infectious disease ROS     Malignancy:  - neg malignancy ROS     Other:     (-) Any chance pregnant       Physical Exam    Airway        Mallampati: II   TM distance: > 3 FB   Neck ROM: full   Mouth opening: > 3 cm    Respiratory Devices and Support         Dental  no notable dental history         Cardiovascular   cardiovascular exam normal       Rhythm and rate: regular and normal (-) no murmur    Pulmonary           breath sounds clear to auscultation           OUTSIDE LABS:  CBC: No results found for: WBC, HGB, HCT,  PLT  BMP: No results found for: NA, POTASSIUM, CHLORIDE, CO2, BUN, CR, GLC  COAGS: No results found for: PTT, INR, FIBR  POC:   Lab Results   Component Value Date    BG 95 06/29/2021     HEPATIC: No results found for: ALBUMIN, PROTTOTAL, ALT, AST, GGT, ALKPHOS, BILITOTAL, BILIDIRECT, HENRY  OTHER: No results found for: PH, LACT, A1C, ELIJAH, PHOS, MAG, LIPASE, AMYLASE, TSH, T4, T3, CRP, SED    Anesthesia Plan    ASA Status:  2   NPO Status:  NPO Appropriate    Anesthesia Type: General.     - Airway: ETT   Induction: Intravenous.   Maintenance: Inhalation.        Consents    Anesthesia Plan(s) and associated risks, benefits, and realistic alternatives discussed. Questions answered and patient/representative(s) expressed understanding.     - Discussed with:  Patient      - Extended Intubation/Ventilatory Support Discussed: No.      - Patient is DNR/DNI Status: No    Use of blood products discussed: No .     Postoperative Care    Pain management: IV analgesics.   PONV prophylaxis: Ondansetron (or other 5HT-3), Dexamethasone or Solumedrol     Comments:    Patient seen and examined.  Risks, benefits and alternatives to GETA discussed.  Questions answered and patient wishes to proceed              Thierno Emanuel MD

## 2021-06-29 NOTE — OR NURSING
Discharge instructions reviewed with patient and ,  verbalized understanding and retention of all education and instructions given, all questions and concerns addressed, written instructions sent home.

## 2021-06-29 NOTE — ANESTHESIA PROCEDURE NOTES
Airway       Patient location during procedure: OR  Staff -        Other Anesthesia Staff: Marcel Jean       Performed By: SRNA  Consent for Airway        Urgency: elective  Indications and Patient Condition       Indications for airway management: diana-procedural       Induction type:intravenous       Mask difficulty assessment: 1 - vent by mask    Final Airway Details       Final airway type: endotracheal airway       Successful airway: ETT - single  Endotracheal Airway Details        ETT size (mm): 7.0       Cuffed: yes       Successful intubation technique: direct laryngoscopy       DL Blade Type: Palacios 2       Grade View of Cords: 1       Adjucts: stylet       Position: Right       Measured from: gums/teeth       Secured at (cm): 20       Bite block used: None    Post intubation assessment        Placement verified by: capnometry, equal breath sounds and chest rise        Number of attempts at approach: 1       Secured with: pink tape       Ease of procedure: easy       Dentition: Intact and Unchanged    Medication(s) Administered   Medication Administration Time: 6/29/2021 2:10 PM

## 2021-06-29 NOTE — ANESTHESIA CARE TRANSFER NOTE
Patient: Dayan Madison    Procedure(s):  ENDOSCOPIC RETROGRADE CHOLANGIOPANCREATOGRAPHY, WITH DIRECT DUCT VISUALIZATION, USING PANCREATICOBILIARY FIBEROPTIC PROBE dilation, stent exchange    Diagnosis: Dilation of biliary tract [K83.8]  Diagnosis Additional Information: No value filed.    Anesthesia Type:   General     Note:    Oropharynx: oropharynx clear of all foreign objects  Level of Consciousness: awake  Oxygen Supplementation: face mask  Level of Supplemental Oxygen (L/min / FiO2): 6  Independent Airway: airway patency satisfactory and stable  Dentition: dentition unchanged  Vital Signs Stable: post-procedure vital signs reviewed and stable  Report to RN Given: handoff report given  Patient transferred to: PACU    Handoff Report: Identifed the Patient, Identified the Reponsible Provider, Reviewed the pertinent medical history, Discussed the surgical course, Reviewed Intra-OP anesthesia mangement and issues during anesthesia, Set expectations for post-procedure period and Allowed opportunity for questions and acknowledgement of understanding      Vitals: (Last set prior to Anesthesia Care Transfer)  CRNA VITALS  6/29/2021 1457 - 6/29/2021 1531      6/29/2021             Pulse:  81    SpO2:  96 %    Resp Rate (observed):  (!) 1        Electronically Signed By: LAUREL Carter CRNA  June 29, 2021  3:31 PM

## 2021-06-30 LAB — COPATH REPORT: NORMAL

## 2021-07-03 NOTE — ADDENDUM NOTE
Addendum Note by Bijal Solis RN at 8/7/2019 12:29 PM     Author: Bijal Solis RN Service: -- Author Type: Registered Nurse    Filed: 8/7/2019 12:29 PM Encounter Date: 8/6/2019 Status: Signed    : Bijal Solis RN (Registered Nurse)    Addended by: BIJAL SOLIS on: 8/7/2019 12:29 PM        Modules accepted: Orders

## 2021-07-03 NOTE — ADDENDUM NOTE
Addendum Note by Bridget Titus CNP at 12/9/2020  9:40 AM     Author: Bridget Titus CNP Service: -- Author Type: Nurse Practitioner    Filed: 12/9/2020  4:15 PM Encounter Date: 12/9/2020 Status: Signed    : Bridget Titus CNP (Nurse Practitioner)    Addended by: BRIDGET TITSU on: 12/9/2020 04:15 PM        Modules accepted: Orders

## 2021-07-06 ENCOUNTER — PATIENT OUTREACH (OUTPATIENT)
Dept: GASTROENTEROLOGY | Facility: CLINIC | Age: 46
End: 2021-07-06

## 2021-07-06 DIAGNOSIS — Z46.89 ENCOUNTER FOR REMOVAL OF BILIARY STENT: Primary | ICD-10-CM

## 2021-07-06 DIAGNOSIS — K83.1 BILIARY STRICTURE (H): ICD-10-CM

## 2021-07-06 NOTE — TELEPHONE ENCOUNTER
"Post ERCP (6-29-21) with Dr. Morales: Follow-up    Post procedure recommendations:   As discussed previously, our plan includes ensuring the stent migrates spontaneously out of the ducts (per stool) and a surveillance MRI/MRCP in .    Orders placed: abd xray 4 weeks, MRCP 3 months    Patient states: staying hydrated, eating soft foods, high fiber foods causing \"issues\"    Clinic contact and scheduling numbers verified for future questions/concerns.    Vy Waterman RN Care Coordinator        "

## 2021-08-19 ENCOUNTER — TELEPHONE (OUTPATIENT)
Dept: GASTROENTEROLOGY | Facility: CLINIC | Age: 46
End: 2021-08-19

## 2021-08-20 ENCOUNTER — TELEPHONE (OUTPATIENT)
Dept: FAMILY MEDICINE | Facility: CLINIC | Age: 46
End: 2021-08-20

## 2021-08-20 DIAGNOSIS — E89.0 POSTSURGICAL HYPOTHYROIDISM: ICD-10-CM

## 2021-08-20 DIAGNOSIS — C73 MALIGNANT NEOPLASM OF THYROID GLAND (H): ICD-10-CM

## 2021-08-20 NOTE — TELEPHONE ENCOUNTER
Medication: levothyroxine 0.2mg (200mcg)  Last Date Filled: 6/16/2021  Last appointment addressing medication: 6/22/2021  Last B/P:  BP Readings from Last 3 Encounters:   06/29/21 (!) 146/76   06/22/21 (!) 146/96   12/09/20 132/88     Last labs pertaining to refill: 6/29/2021      Pend medication and associate diagnosis before routing to Provider for review.       If patient has not been seen in over 1 year, pend 30 day supply and notify patient they are due for an appointment before any further refills.

## 2021-08-23 ENCOUNTER — MYC MEDICAL ADVICE (OUTPATIENT)
Dept: FAMILY MEDICINE | Facility: CLINIC | Age: 46
End: 2021-08-23

## 2021-08-24 RX ORDER — LEVOTHYROXINE SODIUM 200 UG/1
TABLET ORAL
Qty: 90 TABLET | Refills: 1 | Status: SHIPPED | OUTPATIENT
Start: 2021-08-24 | End: 2021-10-29

## 2021-08-24 NOTE — TELEPHONE ENCOUNTER
"Routing refill request to provider for review/approval because:  Labs out of range:  TSH    Last Written Prescription Date:  2/8/21  Last Fill Quantity: 90,  # refills: 1   Last office visit provider:  6/22/21     Requested Prescriptions   Pending Prescriptions Disp Refills     levothyroxine (SYNTHROID/LEVOTHROID) 200 MCG tablet 90 tablet 1       Thyroid Protocol Failed - 8/20/2021 10:53 AM        Failed - Normal TSH on file in past 12 months     Recent Labs   Lab Test 12/09/20  1026   TSH 0.05*              Passed - Patient is 12 years or older        Passed - Recent (12 mo) or future (30 days) visit within the authorizing provider's specialty     Patient has had an office visit with the authorizing provider or a provider within the authorizing providers department within the previous 12 mos or has a future within next 30 days. See \"Patient Info\" tab in inbasket, or \"Choose Columns\" in Meds & Orders section of the refill encounter.              Passed - Medication is active on med list        Passed - No active pregnancy on record     If patient is pregnant or has had a positive pregnancy test, please check TSH.          Passed - No positive pregnancy test in past 12 months     If patient is pregnant or has had a positive pregnancy test, please check TSH.               Rihcard Ying RN 08/24/21 12:58 PM  "

## 2021-09-24 ENCOUNTER — HOSPITAL ENCOUNTER (OUTPATIENT)
Dept: MRI IMAGING | Facility: CLINIC | Age: 46
End: 2021-09-24
Attending: INTERNAL MEDICINE
Payer: COMMERCIAL

## 2021-09-24 ENCOUNTER — HOSPITAL ENCOUNTER (OUTPATIENT)
Dept: RADIOLOGY | Facility: CLINIC | Age: 46
End: 2021-09-24
Attending: INTERNAL MEDICINE
Payer: COMMERCIAL

## 2021-09-24 DIAGNOSIS — Z46.89 ENCOUNTER FOR REMOVAL OF BILIARY STENT: ICD-10-CM

## 2021-09-24 DIAGNOSIS — K83.1 BILIARY STRICTURE (H): ICD-10-CM

## 2021-09-24 PROCEDURE — 74019 RADEX ABDOMEN 2 VIEWS: CPT

## 2021-09-24 PROCEDURE — 74183 MRI ABD W/O CNTR FLWD CNTR: CPT

## 2021-09-24 PROCEDURE — A9585 GADOBUTROL INJECTION: HCPCS | Performed by: INTERNAL MEDICINE

## 2021-09-24 PROCEDURE — 255N000002 HC RX 255 OP 636: Performed by: INTERNAL MEDICINE

## 2021-09-24 RX ORDER — GADOBUTROL 604.72 MG/ML
9 INJECTION INTRAVENOUS ONCE
Status: COMPLETED | OUTPATIENT
Start: 2021-09-24 | End: 2021-09-24

## 2021-09-24 RX ADMIN — GADOBUTROL 9 ML: 604.72 INJECTION INTRAVENOUS at 09:44

## 2021-10-10 ENCOUNTER — HEALTH MAINTENANCE LETTER (OUTPATIENT)
Age: 46
End: 2021-10-10

## 2021-10-11 ENCOUNTER — E-VISIT (OUTPATIENT)
Dept: URGENT CARE | Facility: CLINIC | Age: 46
End: 2021-10-11
Payer: COMMERCIAL

## 2021-10-11 DIAGNOSIS — Z20.822 SUSPECTED COVID-19 VIRUS INFECTION: Primary | ICD-10-CM

## 2021-10-11 PROCEDURE — 99421 OL DIG E/M SVC 5-10 MIN: CPT | Performed by: EMERGENCY MEDICINE

## 2021-10-11 NOTE — PATIENT INSTRUCTIONS
Dear Dayan Madison,    Your symptoms show that you may have coronavirus (COVID-19). This illness can cause fever, cough and trouble breathing. Many people get a mild case and get better on their own. Some people can get very sick.    Will I be tested for COVID-19?  We would like to test you for Covid-19 virus. I have placed orders for this test.     To schedule: go to your [employer] home page and scroll down to the section that says  You have an appointment that needs to be scheduled  and click the large green button that says  Schedule Now  and follow the steps to find the next available openings.    If you are unable to complete these [employer] scheduling steps, please call [phone] to schedule your testing.     Return to work/school/ guidance:  Please let your workplace manager and staffing office know when your quarantine ends     We can t give you an exact date as it depends on the above. You can calculate this on your own or work with your manager/staffing office to calculate this. (For example if you were exposed on 10/4, you would have to quarantine for 14 full days. That would be through 10/18. You could return on 10/19.)      If you receive a positive COVID-19 test result, follow the guidance of the those who are giving you the results. Usually the return to work is 10 (or in some cases 20 days from symptom onset.) If you work at [facility], you must also be cleared by Employee Occupational Health and Safety to return to work.        If you receive a negative COVID-19 test result and did not have a high risk exposure to someone with a known positive COVID-19 test, you can return to work once you're free of fever for 24 hours without fever-reducing medication and your symptoms are improving or resolved.      If you receive a negative COVID-19 test and If you had a high risk exposure to someone who has tested positive for COVID-19 then you can return to work 14 days after your last contact  with the positive individual    Note: If you have ongoing exposure to the covid positive person, this quarantine period may be more than 14 days. (For example, if you are continued to be exposed to your child who tested positive and cannot isolate from them, then the quarantine of 7-14 days can't start until your child is no longer contagious. This is typically 10 days from onset of the child's symptoms. So the total duration may be 17-24 days in this case.)    Sign up for [employer].   We know it's scary to hear that you might have COVID-19. We want to track your symptoms to make sure you're okay over the next 2 weeks. Please look for an email from [employer]--this is a free, online program that we'll use to keep in touch. To sign up, follow the link in the email you will receive. Learn more at [email or web address]    How can I take care of myself?    Get lots of rest. Drink extra fluids (unless a doctor has told you not to)    Take Tylenol (acetaminophen) or ibuprofen for fever or pain. If you have liver or kidney problems, ask your family doctor if it's okay to take Tylenol o ibuprofen    If you have other health problems (like cancer, heart failure, an organ transplant or severe kidney disease): Call your specialty clinic if you don't feel better in the next 2 days.    Know when to call 911. Emergency warning signs include:  o Trouble breathing or shortness of breath  o Pain or pressure in the chest that doesn't go away  o Feeling confused like you haven't felt before, or not being able to wake up  o Bluish-colored lips or face    Where can I get more information?  [facility] - About COVID-19:   [email or web address]    CDC - What to Do If You're Sick:   www.cdc.gov/coronavirus/2019-ncov/about/steps-when-sick.html    October 11, 2021  RE:  Dayan Madison                                                                                                                  [id number] [address]      To whom it may concern:    I evaluated Dayan Madison on October 11, 2021. Dayan Madison should be excused from work/school.     They should let their workplace manager and staffing office know when their quarantine ends.    We can not give an exact date as it depends on the information below. They can calculate this on their own or work with their manager/staffing office to calculate this. (For example if they were exposed on 10/04, they would have to quarantine for 14 full days. That would be through 10/18. They could return on 10/19.)    Quarantine Guidelines:      If patient receives a positive COVID-19 test result, they should follow the guidance of those who are giving the results. Usually the return to work is 10 (or in some cases 20 days from symptom onset.) If they work at [employer], they must be cleared by Employee Occupational Health and Safety to return to work.        If patient receives a negative COVID-19 test result and did not have a high risk exposure to someone with a known positive COVID-19 test, they can return to work once they're free of fever for 24 hours without fever-reducing medication and their symptoms are improving or resolved.      If patient receives a negative COVID-19 test and if they had a high risk exposure to someone who has tested positive for COVID-19 then they can return to work 14 days after their last contact with the positive individual    Note: If there is ongoing exposure to the covid positive person, this quarantine period may be longer than 14 days. (For example, if they are continually exposed to their child, who tested positive and cannot isolate from them, then the quarantine of 7-14 days can't start until their child is no longer contagious. This is typically 10 days from onset to the child's symptoms. So the total duration may be 17-24 days in this case.)     Sincerely,  Asaf Stevens,  MD

## 2021-10-12 ENCOUNTER — LAB (OUTPATIENT)
Dept: FAMILY MEDICINE | Facility: CLINIC | Age: 46
End: 2021-10-12
Attending: EMERGENCY MEDICINE
Payer: COMMERCIAL

## 2021-10-12 DIAGNOSIS — Z20.822 SUSPECTED COVID-19 VIRUS INFECTION: ICD-10-CM

## 2021-10-12 LAB — SARS-COV-2 RNA RESP QL NAA+PROBE: NEGATIVE

## 2021-10-12 PROCEDURE — U0005 INFEC AGEN DETEC AMPLI PROBE: HCPCS

## 2021-10-12 PROCEDURE — U0003 INFECTIOUS AGENT DETECTION BY NUCLEIC ACID (DNA OR RNA); SEVERE ACUTE RESPIRATORY SYNDROME CORONAVIRUS 2 (SARS-COV-2) (CORONAVIRUS DISEASE [COVID-19]), AMPLIFIED PROBE TECHNIQUE, MAKING USE OF HIGH THROUGHPUT TECHNOLOGIES AS DESCRIBED BY CMS-2020-01-R: HCPCS

## 2021-10-25 ENCOUNTER — E-VISIT (OUTPATIENT)
Dept: FAMILY MEDICINE | Facility: CLINIC | Age: 46
End: 2021-10-25
Payer: COMMERCIAL

## 2021-10-25 DIAGNOSIS — R10.84 ABDOMINAL PAIN, GENERALIZED: Primary | ICD-10-CM

## 2021-10-25 PROCEDURE — 99207 PR NON-BILLABLE SERV PER CHARTING: CPT | Performed by: FAMILY MEDICINE

## 2021-10-25 NOTE — PATIENT INSTRUCTIONS
Thank you for choosing us for your care. I think an in-clinic visit would be best next steps based on your symptoms. Please schedule a clinic appointment; you won t be charged for this eVisit.      You can schedule an appointment right here in [facility], or call [phone]

## 2021-10-29 DIAGNOSIS — E89.0 POSTSURGICAL HYPOTHYROIDISM: ICD-10-CM

## 2021-10-29 DIAGNOSIS — C73 MALIGNANT NEOPLASM OF THYROID GLAND (H): ICD-10-CM

## 2021-10-29 RX ORDER — LEVOTHYROXINE SODIUM 200 UG/1
TABLET ORAL
Qty: 90 TABLET | Refills: 0 | Status: SHIPPED | OUTPATIENT
Start: 2021-10-29 | End: 2022-01-28

## 2021-10-29 NOTE — TELEPHONE ENCOUNTER
Reason for Call:  Medication or medication refill:    Do you use a [facility]?  Name of the pharmacy and phone number for the current request:  [facility]- will be using express mail scripts [facility]#[phone]    Name of the medication requested: levothyroxine 200 mcg    Other request:     Can we leave a detailed message on this number? YES    Phone number patient can be reached at: Cell number on file:    Telephone Information:   Mobile [phone]       Best Time:     Call taken on 10/29/2021 at 11:16 AM by Maryam Hamilton

## 2021-10-29 NOTE — TELEPHONE ENCOUNTER
Medication: levothyroxine 200mg  Last Date Filled: 8/24/21  Last appointment addressing medication: 2/8/21  Last B/P:  BP Readings from Last 3 Encounters:   06/29/21 (!) 146/76   06/22/21 (!) 146/96   12/09/20 132/88     Last labs pertaining to refill: 6/29/21      Pend medication and associate diagnosis before routing to Provider for review.       If patient has not been seen in over 1 year, pend 30 day supply and notify patient they are due for an appointment before any further refills.

## 2021-11-03 ENCOUNTER — NURSE TRIAGE (OUTPATIENT)
Dept: NURSING | Facility: CLINIC | Age: 46
End: 2021-11-03

## 2021-11-03 NOTE — TELEPHONE ENCOUNTER
RN triage   Call from pt   Pt states she has covid - dx 10/30- has cough and SOB -- no chest pain - on prednisone and inhalers -- breathing OK -- some SOB -- no fevers now   Pt states she has had abd pain and fevers since 10/7 -- initially off and on --- for the past 2 weeks = constant -- wakes her when sleeping   No fevers now   No nausea - no vomiting - no constipation - no diarrhea   Pain is upper abd   Taking 1000mg 1x/day ibuprofen   Pain worse when stomach is empty   Feels some better 1 hr after taking prednisone   Pt states she has sphincter if Snony dysfunction     Reviewed home care advice   Per protocol = should go to ED or [facility] --- check w/ PCP first   Please advise :  When and where do you want pt seen ?      Janel Hill RN  [facility]  Triage Nurse Advisor      COVID 19 Nurse Triage Plan/Patient Instructions    Please be aware that novel coronavirus (COVID-19) may be circulating in the community. If you develop symptoms such as fever, cough, or SOB or if you have concerns about the presence of another infection including coronavirus (COVID-19), please contact your health care provider or visit [email or web address].     Disposition/Instructions    ED Visit recommended. Follow protocol based instructions.     Bring Your Own Device:  Please also bring your smart device(s) (smart phones, tablets, laptops) and their charging cables for your personal use and to communicate with your care team during your visit.    Thank you for taking steps to prevent the spread of this virus.  o Limit your contact with others.  o Wear a simple mask to cover your cough.  o Wash your hands well and often.    Resources    [facility]: About COVID-19: [email or web address]    CDC: What to Do If You're Sick: www.cdc.gov/coronavirus/2019-ncov/about/steps-when-sick.html    CDC: Ending Home Isolation: www.cdc.gov/coronavirus/2019-ncov/hcp/disposition-in-home-patients.html     CDC: Caring for Someone:  www.cdc.gov/coronavirus/2019-ncov/if-you-are-sick/care-for-someone.html     [facility]: Interim Guidance for Hospital Discharge to Home: www.health.[facility]/diseases/coronavirus/hcp/hospdischarge.pdf    [facility] clinical trials (COVID-19 research studies): [email or web address]-[email or web address]     Below are the COVID-19 hotlines at the [place] Department of [facility] ([facility]). Interpreters are available.   o For health questions: Call 204-014-4240 or 1-986.251.5397 (7 a.m. to 7 p.m.)  o For questions about schools and childcare: Call 380-508-2442 or 1-365.254.3814 (7 a.m. to 7 p.m.)                   Reason for Disposition    Constant abdominal pain lasting > 2 hours    Additional Information    Negative: Passed out (i.e., fainted, collapsed and was not responding)    Negative: Shock suspected (e.g., cold/pale/clammy skin, too weak to stand, low BP, rapid pulse)    Negative: Visible sweat on face or sweat is dripping down    Negative: Chest pain    Negative: Pain lasting > 10 minutes and over 40 years old and associated chest, arm, neck, upper back, or jaw pain    Negative: Bloody, black, or tarry bowel movements (Exception: chronic-unchanged  black-grey bowel movements and is taking iron pills or Pepto-bismol)    Protocols used: ABDOMINAL PAIN - UPPER-A-OH

## 2021-11-03 NOTE — TELEPHONE ENCOUNTER
Please check in with the patient.  Has she tried reaching out to her gastroenterologist yet?  It looks like Dr. Morales was working with her most recently this summer.  I would probably start there to get guidance and direction since he also do the most recent MRI of the abdomen.  If severe, would need to ED evaluation since she is positive Covid.    Tera Titus, CNP

## 2021-11-07 ENCOUNTER — OFFICE VISIT (OUTPATIENT)
Dept: FAMILY MEDICINE | Facility: CLINIC | Age: 46
End: 2021-11-07
Payer: COMMERCIAL

## 2021-11-07 ENCOUNTER — HOSPITAL ENCOUNTER (OUTPATIENT)
Dept: CT IMAGING | Facility: CLINIC | Age: 46
Discharge: HOME | End: 2021-11-07
Attending: FAMILY MEDICINE | Admitting: FAMILY MEDICINE
Payer: COMMERCIAL

## 2021-11-07 VITALS
HEART RATE: 81 BPM | WEIGHT: 210 LBS | TEMPERATURE: 98.7 F | RESPIRATION RATE: 18 BRPM | SYSTOLIC BLOOD PRESSURE: 147 MMHG | OXYGEN SATURATION: 97 % | DIASTOLIC BLOOD PRESSURE: 89 MMHG | BODY MASS INDEX: 36.05 KG/M2

## 2021-11-07 DIAGNOSIS — R10.13 ABDOMINAL PAIN, EPIGASTRIC: Primary | ICD-10-CM

## 2021-11-07 DIAGNOSIS — R50.9 FEVER, UNSPECIFIED FEVER CAUSE: ICD-10-CM

## 2021-11-07 DIAGNOSIS — R10.13 ABDOMINAL PAIN, EPIGASTRIC: ICD-10-CM

## 2021-11-07 LAB
ALBUMIN SERPL-MCNC: 2.7 G/DL (ref 3.5–5)
ALBUMIN UR-MCNC: NEGATIVE MG/DL
ALP SERPL-CCNC: 123 U/L (ref 45–120)
ALT SERPL W P-5'-P-CCNC: 34 U/L (ref 0–45)
ANION GAP SERPL CALCULATED.3IONS-SCNC: 6 MMOL/L (ref 5–18)
APPEARANCE UR: CLEAR
AST SERPL W P-5'-P-CCNC: 17 U/L (ref 0–40)
BASOPHILS # BLD AUTO: 0 10E3/UL (ref 0–0.2)
BASOPHILS NFR BLD AUTO: 0 %
BILIRUB DIRECT SERPL-MCNC: 0.2 MG/DL
BILIRUB SERPL-MCNC: 0.7 MG/DL (ref 0–1)
BILIRUB UR QL STRIP: NEGATIVE
BUN SERPL-MCNC: 13 MG/DL (ref 8–22)
CALCIUM SERPL-MCNC: 10.2 MG/DL (ref 8.5–10.5)
CHLORIDE BLD-SCNC: 99 MMOL/L (ref 98–107)
CO2 SERPL-SCNC: 31 MMOL/L (ref 22–31)
COLOR UR AUTO: YELLOW
CREAT SERPL-MCNC: 0.72 MG/DL (ref 0.6–1.1)
EOSINOPHIL # BLD AUTO: 0.1 10E3/UL (ref 0–0.7)
EOSINOPHIL NFR BLD AUTO: 1 %
ERYTHROCYTE [DISTWIDTH] IN BLOOD BY AUTOMATED COUNT: 12.4 % (ref 10–15)
GFR SERPL CREATININE-BSD FRML MDRD: >90 ML/MIN/1.73M2
GLUCOSE BLD-MCNC: 106 MG/DL (ref 70–125)
GLUCOSE UR STRIP-MCNC: NEGATIVE MG/DL
HCT VFR BLD AUTO: 38.6 % (ref 35–47)
HGB BLD-MCNC: 12.5 G/DL (ref 11.7–15.7)
HGB UR QL STRIP: NEGATIVE
IMM GRANULOCYTES # BLD: 0.2 10E3/UL
IMM GRANULOCYTES NFR BLD: 2 %
KETONES UR STRIP-MCNC: NEGATIVE MG/DL
LEUKOCYTE ESTERASE UR QL STRIP: NEGATIVE
LIPASE SERPL-CCNC: 22 U/L (ref 0–52)
LYMPHOCYTES # BLD AUTO: 2.1 10E3/UL (ref 0.8–5.3)
LYMPHOCYTES NFR BLD AUTO: 20 %
MCH RBC QN AUTO: 27.2 PG (ref 26.5–33)
MCHC RBC AUTO-ENTMCNC: 32.4 G/DL (ref 31.5–36.5)
MCV RBC AUTO: 84 FL (ref 78–100)
MONOCYTES # BLD AUTO: 0.7 10E3/UL (ref 0–1.3)
MONOCYTES NFR BLD AUTO: 7 %
NEUTROPHILS # BLD AUTO: 7.5 10E3/UL (ref 1.6–8.3)
NEUTROPHILS NFR BLD AUTO: 70 %
NITRATE UR QL: NEGATIVE
PH UR STRIP: 7 [PH] (ref 5–8)
PLATELET # BLD AUTO: 330 10E3/UL (ref 150–450)
POTASSIUM BLD-SCNC: 4.2 MMOL/L (ref 3.5–5)
PROT SERPL-MCNC: 7.3 G/DL (ref 6–8)
RBC # BLD AUTO: 4.6 10E6/UL (ref 3.8–5.2)
SODIUM SERPL-SCNC: 136 MMOL/L (ref 136–145)
SP GR UR STRIP: 1.01 (ref 1–1.03)
UROBILINOGEN UR STRIP-ACNC: 0.2 E.U./DL
WBC # BLD AUTO: 10.7 10E3/UL (ref 4–11)

## 2021-11-07 PROCEDURE — 74177 CT ABD & PELVIS W/CONTRAST: CPT

## 2021-11-07 PROCEDURE — 83690 ASSAY OF LIPASE: CPT

## 2021-11-07 PROCEDURE — 99214 OFFICE O/P EST MOD 30 MIN: CPT | Performed by: FAMILY MEDICINE

## 2021-11-07 PROCEDURE — 81003 URINALYSIS AUTO W/O SCOPE: CPT | Performed by: FAMILY MEDICINE

## 2021-11-07 PROCEDURE — 36415 COLL VENOUS BLD VENIPUNCTURE: CPT

## 2021-11-07 PROCEDURE — 85025 COMPLETE CBC W/AUTO DIFF WBC: CPT

## 2021-11-07 PROCEDURE — 80053 COMPREHEN METABOLIC PANEL: CPT

## 2021-11-07 PROCEDURE — 250N000011 HC RX IP 250 OP 636: Performed by: FAMILY MEDICINE

## 2021-11-07 PROCEDURE — 82248 BILIRUBIN DIRECT: CPT

## 2021-11-07 RX ORDER — IOPAMIDOL 755 MG/ML
100 INJECTION, SOLUTION INTRAVASCULAR ONCE
Status: COMPLETED | OUTPATIENT
Start: 2021-11-07 | End: 2021-11-07

## 2021-11-07 RX ADMIN — IOPAMIDOL 100 ML: 755 INJECTION, SOLUTION INTRAVENOUS at 12:00

## 2021-11-07 NOTE — PROGRESS NOTES
"  Assessment & Plan     Abdominal pain, epigastric  Labs as below.  CT abdomen pelvis was stable from previous, cause of abdominal pain not identified on CT.  With recent frequent ibuprofen use for a month and recent prednisone use, concern for gastritis or ulcer.  Recommend increasing omeprazole to 20 mg twice daily and follow-up right away if severe pain or if bloody or black stool.   - Lipase  - Hepatic function panel  - Basic metabolic panel  (Ca, Cl, CO2, Creat, Gluc, K, Na, BUN)  - CBC with platelets and differential  - CT Abdomen Pelvis w Contrast  - omeprazole (PRILOSEC) 20 MG DR capsule  Dispense: 60 capsule; Refill: 0    Fever  Patient reports intermittent fevers over the last month.  She recently tested positive for coronavirus by her report, with symptom onset 11 days ago.  He is still coughing but appears well.  Chest x-ray completed today and clear.  UA negative for infection.  Abdominal pain work-up as above was unremarkable including CT, no source of infection, and WBC is wnl.  Recommend she not return to work until fever free for 24 hours.  Recommend she follow-up with her PCP within a week for recheck.  - XR Chest 2 Views  - UA macro with reflex to Microscopic and Culture - Clinc Collect               BMI:   Estimated body mass index is 36.05 kg/m  as calculated from the following:    Height as of 6/29/21: 1.626 m (5' 4\").    Weight as of this encounter: 95.3 kg (210 lb).           No follow-ups on file.    Farzana Ramos MD  [facility]    Dionte Hanley is a 46 year old who presents for the following health issues     HPI   Abd pain x 1 month, intermittent fevers x 1 month. Similar symptoms in past when biliary duct obstruction recurs (last stented in May)    She has been delaying coming in b/c no apts. Also was waiting to get out of isolation period for covid (symptoms started 12 days ago, tested positive about a week ago).    Able to eat as long as " careful what she eats (bland) and takes ibu for pain. Good fluid intake and normal UO.     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 Pain feels similar but usually stents have lasted 2 years. In past pain has been acute onset and more severe and not letting up. Her current pain is not as severe as previous    Appt w [facility] appt Dec 22 (soonest she could get), can't wait  Saw [facility] for biliary stents end of June    Was on prednisone at start of covid, held ibu during that time. No blood in stool, no black stool.           Review of Systems         Objective    BP (!) 147/89   Pulse 81   Temp 98.7  F (37.1  C) (Oral)   Resp 18   Wt 95.3 kg (210 lb)   LMP 11/05/2021 (Approximate)   SpO2 97%   BMI 36.05 kg/m    Body mass index is 36.05 kg/m .  Physical Exam   GENERAL: healthy, alert and no distress  EYES: Eyes grossly normal to inspection, PERRL and conjunctivae and sclerae normal  HENT: ear canals and TM's normal, nose and mouth without ulcers or lesions  NECK: no adenopathy, no asymmetry  RESP: lungs clear to auscultation - no rales, rhonchi or wheezes  CV: regular rate and rhythm, normal S1 S2, no S3 or S4, no murmur, click or rub, no peripheral edema  ABDOMEN: soft, nondistended.  Pain with palpation in the epigastric region with mild voluntary guarding and no rebound.   MS: no gross musculoskeletal defects noted, no edema

## 2021-11-07 NOTE — PATIENT INSTRUCTIONS
CT was stable compared to previous, and cause for abdominal pain was not seen on CT scan.    I recommend stopping ibuprofen, Aleve, or other NSAIDs, and increase your omeprazole to 20 mg twice daily for at least the next 2 weeks.  If the upper abdominal pain is not getting better over 2 weeks I recommend you be reevaluated, but right away if severe pain, worsening pain, new symptoms such as unable to eat, nausea and vomiting, blood in the stool or black stool.    Your chest x-ray was clear, no pneumonia.  Your urine was normal.  CBC, kidney and liver labs, and pancreas lab lipase were all unremarkable.    Follow-up with your PCP within a week for recheck.  Sooner if worsening or new symptoms.    I recommend you not return to work until fever free for over 24 hours without Tylenol or ibuprofen.

## 2022-01-12 ENCOUNTER — VIRTUAL VISIT (OUTPATIENT)
Dept: FAMILY MEDICINE | Facility: CLINIC | Age: 47
End: 2022-01-12
Payer: COMMERCIAL

## 2022-01-12 DIAGNOSIS — L30.9 DERMATITIS: Primary | ICD-10-CM

## 2022-01-12 PROCEDURE — 99213 OFFICE O/P EST LOW 20 MIN: CPT | Mod: 95 | Performed by: NURSE PRACTITIONER

## 2022-01-12 RX ORDER — PREDNISONE 20 MG/1
20 TABLET ORAL 2 TIMES DAILY
Qty: 10 TABLET | Refills: 0 | Status: SHIPPED | OUTPATIENT
Start: 2022-01-12 | End: 2022-01-17

## 2022-01-12 ASSESSMENT — ENCOUNTER SYMPTOMS
CHILLS: 0
FEVER: 0
SHORTNESS OF BREATH: 0

## 2022-01-12 NOTE — PROGRESS NOTES
Dayan is a 46 year old who is being evaluated via a billable video visit.    Text link to [phone]   How would you like to obtain your AVS? MyChart  If the video visit is dropped, the invitation should be resent by: Text to cell phone: [phone]  Will anyone else be joining your video visit? No    Video Start Time: 3:52    Assessment & Plan     1. Dermatitis  - recommend daily antihistamine, cool baths, loose cotton clothing.   - predniSONE (DELTASONE) 20 MG tablet; Take 1 tablet (20 mg) by mouth 2 times daily for 5 days  Dispense: 10 tablet; Refill: 0      Return in about 5 days (around 1/17/2022) for worsening symptoms or failure to improve.    LA[facility]L [facility]   Dayan is a 46 year old who presents for the following health issues     HPI     Rash  Onset/Duration: 4 days  Description  Location: left inner thigh & abdomen area and spreading  Character: red, pimples  Itching: severe  Intensity:  severe  Progression of Symptoms:  worsening  Accompanying signs and symptoms:   Fever: no  Body aches or joint pain: no  Sore throat symptoms: no  Recent cold symptoms: no  History:           Previous episodes of similar rash: None  New exposures:  None  Recent travel: no  Exposure to similar rash: no  Precipitating or alleviating factors:   Therapies tried and outcome: kenalog cream helps some, oatmeal baths, cortisone.     Reports red itching rash on abdomen, inner arms, top of thighs and inner thighs x 4 days.  Denies any new soaps, lotion, detergent, or contact with known irritants.  No associated respiratory symptoms.  No one in her home has a similar rash.       Review of Systems   Constitutional: Negative for chills and fever.   Respiratory: Negative for shortness of breath.    Cardiovascular: Negative for chest pain.   Skin: Positive for rash.            Objective           Vitals:  No vitals were obtained today due to virtual visit.    Physical  Exam   GENERAL: Healthy, alert and no distress  EYES: Eyes grossly normal to inspection.  No discharge or erythema, or obvious scleral/conjunctival abnormalities.  RESP: No audible wheeze, cough, or visible cyanosis.  No visible retractions or increased work of breathing.    SKIN: Erythematous maculopapular lesion visible on abdomen.   NEURO: Cranial nerves grossly intact.  Mentation and speech appropriate for age.  PSYCH: Mentation appears normal, affect normal/bright, judgement and insight intact, normal speech and appearance well-groomed.                Video-Visit Details    Type of service:  Video Visit    Video End Time:4:00    Originating Location (pt. Location): Other Employer    Distant Location (provider location):  [facility]     Platform used for Video Visit: LuannUUSEE

## 2022-01-28 ENCOUNTER — MYC REFILL (OUTPATIENT)
Dept: FAMILY MEDICINE | Facility: CLINIC | Age: 47
End: 2022-01-28
Payer: COMMERCIAL

## 2022-01-28 DIAGNOSIS — K83.4 SPHINCTER OF ODDI DYSFUNCTION: ICD-10-CM

## 2022-01-28 DIAGNOSIS — C73 MALIGNANT NEOPLASM OF THYROID GLAND (H): ICD-10-CM

## 2022-01-28 DIAGNOSIS — E89.0 POSTSURGICAL HYPOTHYROIDISM: ICD-10-CM

## 2022-01-31 NOTE — TELEPHONE ENCOUNTER
"Routing refill request to provider for review/approval because:  Labs not current:  TSH    Last Written Prescription Date:  10/29/21  Last Fill Quantity: 90,  # refills: 0   Last office visit provider:  6/22/21     Requested Prescriptions   Pending Prescriptions Disp Refills     levothyroxine (SYNTHROID/LEVOTHROID) 200 MCG tablet 90 tablet 0     Sig: [LEVOTHYROXINE (SYNTHROID, LEVOTHROID) 200 MCG TABLET] TAKE ONE TABLET BY MOUTH ONCE DAILY       Thyroid Protocol Failed - 1/28/2022  3:53 PM        Failed - Normal TSH on file in past 12 months     Recent Labs   Lab Test 12/09/20  1026   TSH 0.05*              Passed - Patient is 12 years or older        Passed - Recent (12 mo) or future (30 days) visit within the authorizing provider's specialty     Patient has had an office visit with the authorizing provider or a provider within the authorizing providers department within the previous 12 mos or has a future within next 30 days. See \"Patient Info\" tab in inbasket, or \"Choose Columns\" in Meds & Orders section of the refill encounter.              Passed - Medication is active on med list        Passed - No active pregnancy on record     If patient is pregnant or has had a positive pregnancy test, please check TSH.          Passed - No positive pregnancy test in past 12 months     If patient is pregnant or has had a positive pregnancy test, please check TSH.               Richard Ying RN 01/31/22 10:53 AM  "

## 2022-01-31 NOTE — TELEPHONE ENCOUNTER
"Last Written Prescription Date:  2/8/21  Last Fill Quantity: 90,  # refills: 3   Last office visit provider:  6/22/21     Requested Prescriptions   Pending Prescriptions Disp Refills     omeprazole (PRILOSEC) 20 MG DR capsule [Pharmacy Med Name: OMEPRAZOLE 20MG CPDR] 60 capsule 0     Sig: TAKE ONE CAPSULE BY MOUTH TWICE A DAY       PPI Protocol Passed - 1/28/2022  4:00 PM        Passed - Not on Clopidogrel (unless Pantoprazole ordered)        Passed - No diagnosis of osteoporosis on record        Passed - Recent (12 mo) or future (30 days) visit within the authorizing provider's specialty     Patient has had an office visit with the authorizing provider or a provider within the authorizing providers department within the previous 12 mos or has a future within next 30 days. See \"Patient Info\" tab in inbasket, or \"Choose Columns\" in Meds & Orders section of the refill encounter.              Passed - Medication is active on med list        Passed - Patient is age 18 or older        Passed - No active pregnacy on record        Passed - No positive pregnancy test in past 12 months             Richard Ying RN 01/31/22 10:55 AM  "

## 2022-02-01 RX ORDER — LEVOTHYROXINE SODIUM 200 UG/1
TABLET ORAL
Qty: 90 TABLET | Refills: 1 | Status: SHIPPED | OUTPATIENT
Start: 2022-02-01 | End: 2022-07-15

## 2022-07-14 DIAGNOSIS — E89.0 POSTSURGICAL HYPOTHYROIDISM: ICD-10-CM

## 2022-07-14 DIAGNOSIS — C73 MALIGNANT NEOPLASM OF THYROID GLAND (H): ICD-10-CM

## 2022-07-14 DIAGNOSIS — F41.8 DEPRESSION WITH ANXIETY: ICD-10-CM

## 2022-07-14 NOTE — LETTER
August 17, 2022      Dayan Madison  [address] [address]          Dear Dayan,     Our records indicate that you are overdue for an appointment with your physician for one of the following: Physical or Medication Follow Up. Please contact our office at [phone] as soon as possible to schedule an appointment.    If you have receive this care elsewhere, please contact our office so we can update your medical record. If you have transferred care please call and request a records release. This will ensure that your new physician has complete records to provide you with the best care.             Sincerely,        The physicians and staff at the [facility]

## 2022-07-15 RX ORDER — LEVOTHYROXINE SODIUM 200 UG/1
TABLET ORAL
Qty: 90 TABLET | Refills: 0 | Status: SHIPPED | OUTPATIENT
Start: 2022-07-15 | End: 2022-10-13

## 2022-07-15 NOTE — TELEPHONE ENCOUNTER
"Routing refill request to provider for review/approval because:  Labs not current:  phq 9  Due to be seen    Last Written Prescription Date:  2/1/22 levo; 4/27/21 90/3 prozac  Last Fill Quantity: 90,  # refills: 1   Last office visit provider:  6/22/21     Requested Prescriptions   Pending Prescriptions Disp Refills     levothyroxine (SYNTHROID/LEVOTHROID) 200 MCG tablet [Pharmacy Med Name: LEVOTHYROXINE SOD 200MCG TAB] 90 tablet 1     Sig: TAKE ONE TABLET BY MOUTH EVERY DAY       Thyroid Protocol Failed - 7/14/2022  8:46 PM        Failed - Normal TSH on file in past 12 months     Recent Labs   Lab Test 12/09/20  1026   TSH 0.05*              Passed - Patient is 12 years or older        Passed - Recent (12 mo) or future (30 days) visit within the authorizing provider's specialty     Patient has had an office visit with the authorizing provider or a provider within the authorizing providers department within the previous 12 mos or has a future within next 30 days. See \"Patient Info\" tab in inbasket, or \"Choose Columns\" in Meds & Orders section of the refill encounter.              Passed - Medication is active on med list        Passed - No active pregnancy on record     If patient is pregnant or has had a positive pregnancy test, please check TSH.          Passed - No positive pregnancy test in past 12 months     If patient is pregnant or has had a positive pregnancy test, please check TSH.             FLUoxetine (PROZAC) 20 MG capsule [Pharmacy Med Name: FLUOXETINE HCL 20MG CAPS] 90 capsule 2     Sig: TAKE ONE CAPSULE BY MOUTH EVERY DAY       SSRIs Protocol Failed - 7/14/2022  8:46 PM        Failed - PHQ-9 score less than 5 in past 6 months     Please review last PHQ-9 score.           Failed - Recent (6 mo) or future (30 days) visit within the authorizing provider's specialty     Patient had office visit in the last 6 months or has a visit in the next 30 days with authorizing provider or within the authorizing " "provider's specialty.  See \"Patient Info\" tab in inbasket, or \"Choose Columns\" in Meds & Orders section of the refill encounter.            Passed - Medication is active on med list        Passed - Patient is age 18 or older        Passed - No active pregnancy on record        Passed - No positive pregnancy test in last 12 months             Imani Robin RN 07/15/22 10:43 AM  "

## 2022-07-15 NOTE — TELEPHONE ENCOUNTER
Refill sent.  Please call patient.  Gram stain 1 year since seen and due for some lab work as well.  Please help get set up for physical or med check    Tera Titus NP

## 2022-07-16 ENCOUNTER — HEALTH MAINTENANCE LETTER (OUTPATIENT)
Age: 47
End: 2022-07-16

## 2022-08-23 ENCOUNTER — PATIENT OUTREACH (OUTPATIENT)
Dept: FAMILY MEDICINE | Facility: CLINIC | Age: 47
End: 2022-08-23

## 2022-08-23 NOTE — TELEPHONE ENCOUNTER
Patient Quality Outreach Summary      Summary:    Patient is due/failing the following:   ACT needed    Type of outreach:    Sent [employer] message.    Questions for provider review:    None                                                                                                                    Gen JEREMY CMA      Chart routed to n/a.

## 2022-09-18 ENCOUNTER — HEALTH MAINTENANCE LETTER (OUTPATIENT)
Age: 47
End: 2022-09-18

## 2022-10-12 ASSESSMENT — ENCOUNTER SYMPTOMS
PALPITATIONS: 0
DIARRHEA: 0
CHILLS: 0
SORE THROAT: 0
ABDOMINAL PAIN: 0
NERVOUS/ANXIOUS: 0
HEMATURIA: 0
JOINT SWELLING: 0
FEVER: 0
HEADACHES: 0
PARESTHESIAS: 0
SHORTNESS OF BREATH: 0
DIZZINESS: 0
MYALGIAS: 0
HEARTBURN: 0
HEMATOCHEZIA: 0
COUGH: 0
BREAST MASS: 0
CONSTIPATION: 0
FREQUENCY: 0
NAUSEA: 0
EYE PAIN: 0
WEAKNESS: 0
ARTHRALGIAS: 0
DYSURIA: 0

## 2022-10-12 ASSESSMENT — ASTHMA QUESTIONNAIRES
ACT_TOTALSCORE: 24
QUESTION_5 LAST FOUR WEEKS HOW WOULD YOU RATE YOUR ASTHMA CONTROL: WELL CONTROLLED
QUESTION_1 LAST FOUR WEEKS HOW MUCH OF THE TIME DID YOUR ASTHMA KEEP YOU FROM GETTING AS MUCH DONE AT WORK, SCHOOL OR AT HOME: NONE OF THE TIME
QUESTION_2 LAST FOUR WEEKS HOW OFTEN HAVE YOU HAD SHORTNESS OF BREATH: NOT AT ALL
QUESTION_4 LAST FOUR WEEKS HOW OFTEN HAVE YOU USED YOUR RESCUE INHALER OR NEBULIZER MEDICATION (SUCH AS ALBUTEROL): NOT AT ALL
ACT_TOTALSCORE: 24
QUESTION_3 LAST FOUR WEEKS HOW OFTEN DID YOUR ASTHMA SYMPTOMS (WHEEZING, COUGHING, SHORTNESS OF BREATH, CHEST TIGHTNESS OR PAIN) WAKE YOU UP AT NIGHT OR EARLIER THAN USUAL IN THE MORNING: NOT AT ALL

## 2022-10-13 ENCOUNTER — OFFICE VISIT (OUTPATIENT)
Dept: FAMILY MEDICINE | Facility: CLINIC | Age: 47
End: 2022-10-13
Payer: COMMERCIAL

## 2022-10-13 VITALS
SYSTOLIC BLOOD PRESSURE: 122 MMHG | OXYGEN SATURATION: 98 % | RESPIRATION RATE: 16 BRPM | DIASTOLIC BLOOD PRESSURE: 88 MMHG | TEMPERATURE: 98.4 F | WEIGHT: 218 LBS | HEART RATE: 76 BPM | BODY MASS INDEX: 37.22 KG/M2 | HEIGHT: 64 IN

## 2022-10-13 DIAGNOSIS — Z12.31 ENCOUNTER FOR SCREENING MAMMOGRAM FOR BREAST CANCER: ICD-10-CM

## 2022-10-13 DIAGNOSIS — Z12.4 CERVICAL CANCER SCREENING: ICD-10-CM

## 2022-10-13 DIAGNOSIS — F41.8 DEPRESSION WITH ANXIETY: ICD-10-CM

## 2022-10-13 DIAGNOSIS — Z85.850 HISTORY OF THYROID CANCER: ICD-10-CM

## 2022-10-13 DIAGNOSIS — Z12.31 VISIT FOR SCREENING MAMMOGRAM: ICD-10-CM

## 2022-10-13 DIAGNOSIS — Z12.11 SCREEN FOR COLON CANCER: ICD-10-CM

## 2022-10-13 DIAGNOSIS — Z00.00 ANNUAL PHYSICAL EXAM: Primary | ICD-10-CM

## 2022-10-13 DIAGNOSIS — L30.9 ECZEMA OF HAND: ICD-10-CM

## 2022-10-13 DIAGNOSIS — K83.4 SPHINCTER OF ODDI DYSFUNCTION: ICD-10-CM

## 2022-10-13 DIAGNOSIS — C73 MALIGNANT NEOPLASM OF THYROID GLAND (H): ICD-10-CM

## 2022-10-13 DIAGNOSIS — E66.01 MORBID OBESITY (H): ICD-10-CM

## 2022-10-13 DIAGNOSIS — B37.2 CANDIDIASIS OF SKIN: ICD-10-CM

## 2022-10-13 DIAGNOSIS — E89.0 POSTSURGICAL HYPOTHYROIDISM: ICD-10-CM

## 2022-10-13 PROBLEM — D64.9 ANEMIA: Status: ACTIVE | Noted: 2022-10-13

## 2022-10-13 PROBLEM — K44.9 DIAPHRAGMATIC HERNIA: Status: ACTIVE | Noted: 2017-11-10

## 2022-10-13 LAB
ERYTHROCYTE [DISTWIDTH] IN BLOOD BY AUTOMATED COUNT: 12.3 % (ref 10–15)
HCT VFR BLD AUTO: 37.2 % (ref 35–47)
HGB BLD-MCNC: 12.6 G/DL (ref 11.7–15.7)
MCH RBC QN AUTO: 28.4 PG (ref 26.5–33)
MCHC RBC AUTO-ENTMCNC: 33.9 G/DL (ref 31.5–36.5)
MCV RBC AUTO: 84 FL (ref 78–100)
PLATELET # BLD AUTO: 275 10E3/UL (ref 150–450)
RBC # BLD AUTO: 4.44 10E6/UL (ref 3.8–5.2)
WBC # BLD AUTO: 9.1 10E3/UL (ref 4–11)

## 2022-10-13 PROCEDURE — 80053 COMPREHEN METABOLIC PANEL: CPT | Performed by: NURSE PRACTITIONER

## 2022-10-13 PROCEDURE — 84443 ASSAY THYROID STIM HORMONE: CPT | Performed by: NURSE PRACTITIONER

## 2022-10-13 PROCEDURE — 99214 OFFICE O/P EST MOD 30 MIN: CPT | Mod: 25 | Performed by: NURSE PRACTITIONER

## 2022-10-13 PROCEDURE — 36415 COLL VENOUS BLD VENIPUNCTURE: CPT | Performed by: NURSE PRACTITIONER

## 2022-10-13 PROCEDURE — 84439 ASSAY OF FREE THYROXINE: CPT | Performed by: NURSE PRACTITIONER

## 2022-10-13 PROCEDURE — 85027 COMPLETE CBC AUTOMATED: CPT | Performed by: NURSE PRACTITIONER

## 2022-10-13 PROCEDURE — 99396 PREV VISIT EST AGE 40-64: CPT | Performed by: NURSE PRACTITIONER

## 2022-10-13 RX ORDER — LEVOTHYROXINE SODIUM 200 UG/1
TABLET ORAL
Qty: 90 TABLET | Refills: 3 | Status: SHIPPED | OUTPATIENT
Start: 2022-10-13 | End: 2022-10-16

## 2022-10-13 RX ORDER — TRIAMCINOLONE ACETONIDE 5 MG/G
CREAM TOPICAL
Qty: 30 G | Refills: 2 | Status: SHIPPED | OUTPATIENT
Start: 2022-10-13

## 2022-10-13 RX ORDER — NYSTATIN 100000 [USP'U]/G
POWDER TOPICAL 3 TIMES DAILY PRN
Qty: 60 G | Refills: 0 | Status: SHIPPED | OUTPATIENT
Start: 2022-10-13

## 2022-10-13 ASSESSMENT — ENCOUNTER SYMPTOMS
HEARTBURN: 0
ABDOMINAL PAIN: 0
HEADACHES: 0
ARTHRALGIAS: 0
PARESTHESIAS: 0
SORE THROAT: 0
FEVER: 0
DYSURIA: 0
DIARRHEA: 0
BREAST MASS: 0
CONSTIPATION: 0
HEMATURIA: 0
PALPITATIONS: 0
WEAKNESS: 0
COUGH: 0
MYALGIAS: 0
HEMATOCHEZIA: 0
FREQUENCY: 0
NAUSEA: 0
JOINT SWELLING: 0
CHILLS: 0
SHORTNESS OF BREATH: 0
NERVOUS/ANXIOUS: 0
EYE PAIN: 0
DIZZINESS: 0

## 2022-10-13 ASSESSMENT — PAIN SCALES - GENERAL: PAINLEVEL: NO PAIN (1)

## 2022-10-13 NOTE — PROGRESS NOTES
SUBJECTIVE:   CC: Dayan is an 47 year old who presents for preventive health visit.     Needs recheck of thyroid. Mood good with prozac.   Asthma has been under good control. Not needing the flovent right now. Goes to the gym doing exercise classes. Has a rash on groin, abdomen folds, and under breasts.      Patient has been advised of split billing requirements and indicates understanding: Yes  Healthy Habits:     Getting at least 3 servings of Calcium per day:  Yes    Bi-annual eye exam:  NO    Dental care twice a year:  Yes    Sleep apnea or symptoms of sleep apnea:  Daytime drowsiness    Diet:  Regular (no restrictions)    Frequency of exercise:  4-5 days/week    Duration of exercise:  45-60 minutes    Taking medications regularly:  Yes    Medication side effects:  None    PHQ-2 Total Score: 0    Additional concerns today:  Yes    Today's PHQ-2 Score:   PHQ-2 ( 1999 Pfizer) 10/12/2022   Q1: Little interest or pleasure in doing things 0   Q2: Feeling down, depressed or hopeless 0   PHQ-2 Score 0   Q1: Little interest or pleasure in doing things Not at all   Q2: Feeling down, depressed or hopeless Not at all   PHQ-2 Score 0       Abuse: Current or Past (Physical, Sexual or Emotional) - No  Do you feel safe in your environment? Yes    Have you ever done Advance Care Planning? (For example, a Health Directive, POLST, or a discussion with a medical provider or your loved ones about your wishes): No, advance care planning information given to patient to review.  Patient plans to discuss their wishes with loved ones or provider.      Social History     Tobacco Use     Smoking status: Never     Smokeless tobacco: Never   Substance Use Topics     Alcohol use: Yes     If you drink alcohol do you typically have >3 drinks per day or >7 drinks per week? No    Alcohol Use 10/12/2022   Prescreen: >3 drinks/day or >7 drinks/week? No       Reviewed orders with patient.  Reviewed health maintenance and updated orders  accordingly - Yes  Lab work is in process    Breast Cancer Screening:    FHS-7:   Breast CA Risk Assessment (FHS-7) 10/12/2022   Did any of your first-degree relatives have breast or ovarian cancer? No   Did any of your relatives have bilateral breast cancer? No   Did any man in your family have breast cancer? No   Did any woman in your family have breast and ovarian cancer? Yes   Did any woman in your family have breast cancer before age 50 y? No   Do you have 2 or more relatives with breast and/or ovarian cancer? No   Do you have 2 or more relatives with breast and/or bowel cancer? Yes     Mammogram Screening: Recommended annual mammography  Pertinent mammograms are reviewed under the imaging tab.    History of abnormal Pap smear: due now. Scheduled with obgyn     Reviewed and updated as needed this visit by clinical staff   Tobacco  Allergies  Meds              Reviewed and updated as needed this visit by Provider                 Past Medical History:   Diagnosis Date     Anxiety      Asthma      Depression      Disease of thyroid gland      Eczema      Gastroesophageal reflux disease      GERD (gastroesophageal reflux disease)      Obese      Sphincter of Oddi dysfunction      Thyroid cancer (H) 2000     Uncomplicated asthma       Past Surgical History:   Procedure Laterality Date     CHOLECYSTECTOMY       ENDOSCOPIC RETROGRADE CHOLANGIOPANCREATOGRAM N/A 8/16/2019    Procedure: ENDOSCOPIC RETROGRADE CHOLANGIOPANCREATOGRAPHY bilinary  spinctomy;  Surgeon: Richard Wade MD;  [facility];  Service: Gastroenterology     ENDOSCOPIC RETROGRADE CHOLANGIOPANCREATOGRAM WITH SPYGLASS N/A 6/29/2021    Procedure: ENDOSCOPIC RETROGRADE CHOLANGIOPANCREATOGRAPHY, WITH DIRECT DUCT VISUALIZATION, USING PANCREATICOBILIARY FIBEROPTIC PROBE dilation, stent exchange;  Surgeon: Rodney Morales MD;  Location: [facility] OR     HC REMOVAL GALLBLADDER      Description: Cholecystectomy;  Recorded: 03/22/2010;   "Comments: 2000     [place] THYROIDECTOMY  2005    Thyroid Surgery Total Thyroidectomy;  Papillary thyroid cancer     XR ERCP BILIARY ONLY  8/16/2019       Review of Systems   Constitutional: Negative for chills and fever.   HENT: Negative for congestion, ear pain, hearing loss and sore throat.    Eyes: Negative for pain and visual disturbance.   Respiratory: Negative for cough and shortness of breath.    Cardiovascular: Negative for chest pain, palpitations and peripheral edema.   Gastrointestinal: Negative for abdominal pain, constipation, diarrhea, heartburn, hematochezia and nausea.   Breasts:  Negative for tenderness, breast mass and discharge.   Genitourinary: Negative for dysuria, frequency, genital sores, hematuria, pelvic pain, urgency, vaginal bleeding and vaginal discharge.   Musculoskeletal: Negative for arthralgias, joint swelling and myalgias.   Skin: Negative for rash.   Neurological: Negative for dizziness, weakness, headaches and paresthesias.   Psychiatric/Behavioral: Negative for mood changes. The patient is not nervous/anxious.        OBJECTIVE:   /88   Pulse 76   Temp 98.4  F (36.9  C)   Resp 16   Ht 1.626 m (5' 4\")   Wt 98.9 kg (218 lb)   LMP 09/15/2022 (Approximate)   SpO2 98%   Breastfeeding No   BMI 37.42 kg/m    Physical Exam  GENERAL: healthy, alert and no distress  EYES: Eyes grossly normal to inspection, PERRL and conjunctivae and sclerae normal  HENT: ear canals and TM's normal, nose and mouth without ulcers or lesions  NECK: no adenopathy, no asymmetry, masses, or scars and thyroid normal to palpation  RESP: lungs clear to auscultation - no rales, rhonchi or wheezes  CV: regular rate and rhythm, normal S1 S2, no S3 or S4, no murmur, click or rub, no peripheral edema and peripheral pulses strong  ABDOMEN: soft, nontender, no hepatosplenomegaly, no masses and bowel sounds normal  MS: no gross musculoskeletal defects noted, no edema  SKIN: with folds on the abdomen and under the " breast there is diffuse redness. Splotchy. Blanchable. No blisters or wheals.  NEURO: Normal strength and tone, mentation intact and speech normal  PSYCH: mentation appears normal, affect normal/bright    Diagnostic Test Results:  Labs reviewed in Epic    ASSESSMENT/PLAN:       ICD-10-CM    1. Annual physical exam  Z00.00       2. Screen for colon cancer  Z12.11 Colonoscopy Screening  Referral      3. Cervical cancer screening  Z12.4       4. Visit for screening mammogram  Z12.31 MA SCREENING DIGITAL BILAT - Future  (s+30)      5. Encounter for screening mammogram for breast cancer  Z12.31 MA SCREENING DIGITAL BILAT - Future  (s+30)      6. Depression with anxiety  F41.8 FLUoxetine (PROZAC) 20 MG capsule      7. Malignant neoplasm of thyroid gland (H)  C73 DISCONTINUED: levothyroxine (SYNTHROID/LEVOTHROID) 200 MCG tablet      8. Postsurgical hypothyroidism  E89.0 TSH     CBC with platelets     Comprehensive metabolic panel (BMP + Alb, Alk Phos, ALT, AST, Total. Bili, TP)     TSH     CBC with platelets     Comprehensive metabolic panel (BMP + Alb, Alk Phos, ALT, AST, Total. Bili, TP)     DISCONTINUED: levothyroxine (SYNTHROID/LEVOTHROID) 200 MCG tablet      9. History of thyroid cancer  Z85.850 T4, free     T4, free      10. Morbid obesity (H)  E66.01 Comprehensive metabolic panel (BMP + Alb, Alk Phos, ALT, AST, Total. Bili, TP)     Comprehensive metabolic panel (BMP + Alb, Alk Phos, ALT, AST, Total. Bili, TP)      11. Candidiasis of skin  B37.2 nystatin (MYCOSTATIN) 771295 UNIT/GM external powder      12. Eczema of hand  L30.9 triamcinolone (ARISTOCORT HP) 0.5 % external cream      13. Sphincter of Oddi dysfunction  K83.4 omeprazole (PRILOSEC) 20 MG DR capsule        Update mammogram, nystatin for suspected candidiasis.  Update monitoring labs for history of thyroid CA s/p thyroidectomy. Pap smear scheduled.  Mood stable. Encouraged exercise and weight loss.  Recommended  "colonoscopy.      COUNSELING:  Reviewed preventive health counseling, as reflected in patient instructions    Estimated body mass index is 37.42 kg/m  as calculated from the following:    Height as of this encounter: 1.626 m (5' 4\").    Weight as of this encounter: 98.9 kg (218 lb).    Weight management plan: Discussed healthy diet and exercise guidelines    She reports that she has never smoked. She has never used smokeless tobacco.      Counseling Resources:  ATP IV Guidelines  Pooled Cohorts Equation Calculator  Breast Cancer Risk Calculator  BRCA-Related Cancer Risk Assessment: FHS-7 Tool  FRAX Risk Assessment  ICSI Preventive Guidelines  Dietary Guidelines for Americans, 2010  USDA's MyPlate  ASA Prophylaxis  Lung CA Screening    Tera Titus NP  [facility]  "

## 2022-10-13 NOTE — PATIENT INSTRUCTIONS
Nystatin powder sent for the rashes.  If this does not take care of it, let me know    Refills of your other medication sent to the pharmacy.    Updating lab work today.    You are due for your colonoscopy.  Contact information is in your paperwork    You are due for your mammogram    Radiology scheduling should be contacting you, but their number is [phone] if you don't hear anything.

## 2022-10-14 ENCOUNTER — TELEPHONE (OUTPATIENT)
Dept: FAMILY MEDICINE | Facility: CLINIC | Age: 47
End: 2022-10-14

## 2022-10-14 LAB
ALBUMIN SERPL BCG-MCNC: 3.9 G/DL (ref 3.5–5.2)
ALP SERPL-CCNC: 88 U/L (ref 35–104)
ALT SERPL W P-5'-P-CCNC: 19 U/L (ref 10–35)
ANION GAP SERPL CALCULATED.3IONS-SCNC: 11 MMOL/L (ref 7–15)
AST SERPL W P-5'-P-CCNC: 23 U/L (ref 10–35)
BILIRUB SERPL-MCNC: 0.5 MG/DL
BUN SERPL-MCNC: 14.1 MG/DL (ref 6–20)
CALCIUM SERPL-MCNC: 9.2 MG/DL (ref 8.6–10)
CHLORIDE SERPL-SCNC: 106 MMOL/L (ref 98–107)
CREAT SERPL-MCNC: 0.78 MG/DL (ref 0.51–0.95)
DEPRECATED HCO3 PLAS-SCNC: 22 MMOL/L (ref 22–29)
GFR SERPL CREATININE-BSD FRML MDRD: >90 ML/MIN/1.73M2
GLUCOSE SERPL-MCNC: 74 MG/DL (ref 70–99)
POTASSIUM SERPL-SCNC: 3.9 MMOL/L (ref 3.4–5.3)
PROT SERPL-MCNC: 6.7 G/DL (ref 6.4–8.3)
SODIUM SERPL-SCNC: 139 MMOL/L (ref 136–145)
T4 FREE SERPL-MCNC: 1.66 NG/DL (ref 0.9–1.7)
TSH SERPL DL<=0.005 MIU/L-ACNC: 0.02 UIU/ML (ref 0.3–4.2)

## 2022-10-14 NOTE — TELEPHONE ENCOUNTER
Prior Authorization Request  Who s requesting:  covermymeds  Pharmacy Name and Location: [facility]  Medication Name: omeprazole  Insurance Plan: [employer]  Insurance Member ID Number:  [id number]  CoverMyMeds Key: [id number]  Informed patient that prior authorizations can take up to 10 business days for response:   Yes  Okay to leave a detailed message:

## 2022-10-14 NOTE — TELEPHONE ENCOUNTER
Central Prior Authorization Team   Phone: [phone]    PA Initiation    Medication: Omeprazole 20MG dr capsules  Insurance Company: [employer] - Phone [phone] Fax [phone]  [facility] the [facility] [place] [address]  Filling Pharmacy Phone: [phone]  Filling Pharmacy Fax:    Start Date: 10/14/2022

## 2022-10-16 ENCOUNTER — MYC MEDICAL ADVICE (OUTPATIENT)
Dept: FAMILY MEDICINE | Facility: CLINIC | Age: 47
End: 2022-10-16

## 2022-10-16 DIAGNOSIS — E89.0 POSTSURGICAL HYPOTHYROIDISM: ICD-10-CM

## 2022-10-16 DIAGNOSIS — E89.0 POSTSURGICAL HYPOTHYROIDISM: Primary | ICD-10-CM

## 2022-10-16 RX ORDER — LEVOTHYROXINE SODIUM 175 UG/1
175 TABLET ORAL DAILY
Qty: 90 TABLET | Refills: 0 | Status: SHIPPED | OUTPATIENT
Start: 2022-10-16 | End: 2022-10-18

## 2022-10-17 NOTE — TELEPHONE ENCOUNTER
To pcp. JULIAN    incoming message from patient about her TSH level.    La Glover RN on 10/17/2022 at 9:38 AM

## 2022-10-18 RX ORDER — LEVOTHYROXINE SODIUM 200 UG/1
200 TABLET ORAL DAILY
Qty: 90 TABLET | Refills: 3 | Status: SHIPPED | OUTPATIENT
Start: 2022-10-18 | End: 2023-10-31

## 2022-10-18 NOTE — TELEPHONE ENCOUNTER
Prior Authorization Approval    Authorization Effective Date: 9/14/2022  Authorization Expiration Date: 10/14/2023  Medication: Omeprazole 20MG dr capsules  Approved Dose/Quantity:    Reference #:     Insurance Company: [employer] - Phone [phone] Fax [phone]  Expected CoPay:       CoPay Card Available:      Foundation Assistance Needed:    Which Pharmacy is filling the prescription (Not needed for infusion/clinic administered): [facility], [place] - [address]  Pharmacy Notified: Yes  Patient Notified: Yes

## 2022-11-01 ENCOUNTER — TRANSFERRED RECORDS (OUTPATIENT)
Dept: MULTI SPECIALTY CLINIC | Facility: CLINIC | Age: 47
End: 2022-11-01

## 2022-11-01 LAB — PAP SMEAR - HIM PATIENT REPORTED: NEGATIVE

## 2022-11-10 ENCOUNTER — TRANSFERRED RECORDS (OUTPATIENT)
Dept: HEALTH INFORMATION MANAGEMENT | Facility: CLINIC | Age: 47
End: 2022-11-10

## 2022-11-20 ENCOUNTER — TRANSFERRED RECORDS (OUTPATIENT)
Dept: HEALTH INFORMATION MANAGEMENT | Facility: CLINIC | Age: 47
End: 2022-11-20

## 2022-11-24 ENCOUNTER — MYC MEDICAL ADVICE (OUTPATIENT)
Dept: FAMILY MEDICINE | Facility: CLINIC | Age: 47
End: 2022-11-24

## 2022-11-28 ENCOUNTER — E-VISIT (OUTPATIENT)
Dept: FAMILY MEDICINE | Facility: CLINIC | Age: 47
End: 2022-11-28
Payer: COMMERCIAL

## 2022-11-28 DIAGNOSIS — R05.1 ACUTE COUGH: Primary | ICD-10-CM

## 2022-11-28 PROCEDURE — 99207 PR NON-BILLABLE SERV PER CHARTING: CPT | Performed by: FAMILY MEDICINE

## 2022-11-28 NOTE — PATIENT INSTRUCTIONS
Dear Dayan Madison,    We are sorry you are not feeling well. Based on the responses you provided, it is recommended that you be seen in-person in urgent care so we can better evaluate your symptoms. Please click here to find the nearest urgent care location to you.   You will not be charged for this Visit. Thank you for trusting us with your care.    Savannah Saleem MD

## 2023-03-07 ENCOUNTER — NURSE TRIAGE (OUTPATIENT)
Dept: NURSING | Facility: CLINIC | Age: 48
End: 2023-03-07
Payer: COMMERCIAL

## 2023-03-07 DIAGNOSIS — J45.21 MILD INTERMITTENT ASTHMA WITH ACUTE EXACERBATION: ICD-10-CM

## 2023-03-07 RX ORDER — ALBUTEROL SULFATE 90 UG/1
2 AEROSOL, METERED RESPIRATORY (INHALATION) EVERY 6 HOURS PRN
Status: CANCELLED
Start: 2023-03-07

## 2023-03-07 RX ORDER — ALBUTEROL SULFATE 90 UG/1
2 AEROSOL, METERED RESPIRATORY (INHALATION) EVERY 6 HOURS PRN
Status: CANCELLED | OUTPATIENT
Start: 2023-03-07

## 2023-03-07 NOTE — TELEPHONE ENCOUNTER
Had been having a cough for 4 months and is out of Albuterol. Denies SOB. Denies Chest pain. Care advice is to be seen within 3 days.    Already has appt with PCP tomorrow.  Evi Leon RN on 3/7/2023 at 3:58 PM      Reason for Disposition    History of asthma or has mild wheezing    Additional Information    Negative: SEVERE difficulty breathing (e.g., struggling for each breath, speaks in single words)    Negative: Lips or face are bluish now and persists when not coughing    Negative: Sounds like a life-threatening emergency to the triager    Negative: MODERATE difficulty breathing (e.g., speaks in phrases, SOB even at rest, pulse 100-120) and still present when not coughing    Negative: Chest pain  (Exception: MILD central chest pain, present only when coughing.)    Negative: Increasing difficulty breathing and always has some difficulty breathing    Negative: Patient sounds very sick or weak to the triager    Negative: MILD difficulty breathing (e.g., minimal/no SOB at rest, SOB with walking, pulse < 100) and still present when not coughing  (Exception: No change from usual, chronic shortness of breath.)    Negative: Coughed up blood and > 1 tablespoon (15 ml)  (Exception: Blood-tinged sputum.)    Negative: Fever > 103 F (39.4 C)    Negative: Fever > 101 F (38.3 C) and age > 60 years    Negative: Fever > 100.0 F (37.8 C) and bedridden (e.g., nursing home patient, CVA, chronic illness, recovering from surgery)    Negative: Fever > 100.0 F (37.8 C) and diabetes mellitus or weak immune system (e.g., HIV positive, cancer chemo, splenectomy, organ transplant, chronic steroids)    Negative: SEVERE coughing spells (e.g., whooping sound after coughing, vomiting after coughing)    Negative: Continuous (nonstop) coughing interferes with work or school and no improvement using cough treatment per Care Advice    Negative: Fever present > 3 days (72 hours)    Negative: Coughing up kenn-colored sputum    Negative:  Increase in amount of sputum    Negative: Change in color of sputum (e.g., from white to yellow-green sputum)    Negative: Blood-tinged sputum has been coughed up and more than once    Negative: Nasal discharge and present > 10 days    Negative: Sinus pain or pressure (around cheekbone or eye)    Negative: Chest or rib pain and only occurs while coughing    Protocols used: COUGH - CHRONIC-A-OH

## 2023-03-07 NOTE — TELEPHONE ENCOUNTER
Patient calling back - updated her that FNA RN's were not able to re-order the albuterol under our protocols.  If needed before her appointment tomorrow she can go to  or ED tonight. Please call back with any other questions or concerns.    Albuterol refill request was sent in a refill encounter.    NATALIA JOHNSON RN on 3/7/2023 at 4:38 PM

## 2023-03-07 NOTE — TELEPHONE ENCOUNTER
"Routing refill request to provider for review/approval because:  A break in medication  Last Written Prescription Date:  5/23/2019  Last Fill Quantity: 3,  # refills: 3   Last office visit provider:  10/13/2022       Requested Prescriptions   Pending Prescriptions Disp Refills     albuterol (PROAIR HFA/PROVENTIL HFA/VENTOLIN HFA) 108 (90 Base) MCG/ACT inhaler       Sig: Inhale 2 puffs into the lungs every 6 hours as needed       Asthma Maintenance Inhalers - Anticholinergics Passed - 3/7/2023  4:15 PM        Passed - Patient is age 12 years or older        Passed - Asthma control assessment score within normal limits in last 6 months     Please review ACT score.           Passed - Medication is active on med list        Passed - Recent (6 mo) or future (30 days) visit within the authorizing provider's specialty     Patient had office visit in the last 6 months or has a visit in the next 30 days with authorizing provider or within the authorizing provider's specialty.  See \"Patient Info\" tab in inbasket, or \"Choose Columns\" in Meds & Orders section of the refill encounter.           Short-Acting Beta Agonist Inhalers Protocol  Passed - 3/7/2023  4:15 PM        Passed - Patient is age 12 or older        Passed - Asthma control assessment score within normal limits in last 6 months     Please review ACT score.           Passed - Medication is active on med list        Passed - Recent (6 mo) or future (30 days) visit within the authorizing provider's specialty     Patient had office visit in the last 6 months or has a visit in the next 30 days with authorizing provider or within the authorizing provider's specialty.  See \"Patient Info\" tab in inbasket, or \"Choose Columns\" in Meds & Orders section of the refill encounter.                 Evi Leon RN 03/07/23 4:20 PM  "

## 2023-03-08 ENCOUNTER — OFFICE VISIT (OUTPATIENT)
Dept: FAMILY MEDICINE | Facility: CLINIC | Age: 48
End: 2023-03-08
Payer: COMMERCIAL

## 2023-03-08 VITALS
SYSTOLIC BLOOD PRESSURE: 126 MMHG | DIASTOLIC BLOOD PRESSURE: 76 MMHG | TEMPERATURE: 98.1 F | RESPIRATION RATE: 18 BRPM | OXYGEN SATURATION: 100 % | HEART RATE: 74 BPM | BODY MASS INDEX: 37.87 KG/M2 | WEIGHT: 220.6 LBS

## 2023-03-08 DIAGNOSIS — J45.21 MILD INTERMITTENT ASTHMA WITH ACUTE EXACERBATION: ICD-10-CM

## 2023-03-08 DIAGNOSIS — F41.8 DEPRESSION WITH ANXIETY: Primary | ICD-10-CM

## 2023-03-08 PROCEDURE — 99214 OFFICE O/P EST MOD 30 MIN: CPT | Performed by: FAMILY MEDICINE

## 2023-03-08 RX ORDER — PREDNISONE 20 MG/1
40 TABLET ORAL DAILY
Qty: 10 TABLET | Refills: 0 | Status: SHIPPED | OUTPATIENT
Start: 2023-03-08 | End: 2023-03-08

## 2023-03-08 RX ORDER — FLUTICASONE PROPIONATE 220 UG/1
1 AEROSOL, METERED RESPIRATORY (INHALATION) 2 TIMES DAILY
COMMUNITY
End: 2023-11-10

## 2023-03-08 RX ORDER — FLUTICASONE PROPIONATE 220 UG/1
1 AEROSOL, METERED RESPIRATORY (INHALATION) 2 TIMES DAILY
Qty: 12 G | Refills: 1 | Status: SHIPPED | OUTPATIENT
Start: 2023-03-08 | End: 2023-03-08

## 2023-03-08 RX ORDER — ALBUTEROL SULFATE 90 UG/1
2 AEROSOL, METERED RESPIRATORY (INHALATION) EVERY 4 HOURS PRN
Qty: 18 G | Refills: 1 | Status: SHIPPED | OUTPATIENT
Start: 2023-03-08 | End: 2024-05-17

## 2023-03-08 RX ORDER — ALBUTEROL SULFATE 90 UG/1
2 AEROSOL, METERED RESPIRATORY (INHALATION) EVERY 4 HOURS PRN
Qty: 18 G | Refills: 1 | Status: SHIPPED | OUTPATIENT
Start: 2023-03-08 | End: 2023-03-08

## 2023-03-08 RX ORDER — PREDNISONE 20 MG/1
40 TABLET ORAL DAILY
Qty: 10 TABLET | Refills: 0 | Status: SHIPPED | OUTPATIENT
Start: 2023-03-08 | End: 2023-11-10

## 2023-03-08 RX ORDER — FLUTICASONE PROPIONATE 220 UG/1
1 AEROSOL, METERED RESPIRATORY (INHALATION) 2 TIMES DAILY
Qty: 12 G | Refills: 1 | Status: SHIPPED | OUTPATIENT
Start: 2023-03-08 | End: 2024-05-20

## 2023-03-08 ASSESSMENT — PAIN SCALES - GENERAL: PAINLEVEL: NO PAIN (0)

## 2023-03-08 ASSESSMENT — ENCOUNTER SYMPTOMS: COUGH: 1

## 2023-03-08 NOTE — PATIENT INSTRUCTIONS
- predniSONE (DELTASONE) 20 MG tablet; Take 2 tablets (40 mg) by mouth daily for 5 days  - Follow if symptoms worsen or fail to improve.

## 2023-03-08 NOTE — PROGRESS NOTES
Assessment & Plan     Mild intermittent asthma with acute exacerbation  History of depression and anxiety   - She has previously tolerated prednisone burst   - Advised to increase Flovent from one time daily to two times daily   - Advised short burst of prednisone and Dayan agreeable   - fluticasone (FLOVENT HFA) 220 MCG/ACT inhaler; Inhale 1 puff into the lungs 2 times daily  Dispense: 12 g; Refill: 1  - albuterol (PROAIR HFA/PROVENTIL HFA/VENTOLIN HFA) 108 (90 Base) MCG/ACT inhaler; Inhale 2 puffs into the lungs every 4 hours as needed for shortness of breath or wheezing  Dispense: 18 g; Refill: 1  - predniSONE (DELTASONE) 20 MG tablet; Take 2 tablets (40 mg) by mouth daily  Dispense: 10 tablet; Refill: 0  - Follow if symptoms worsen or fail to improve.      Return in about 1 week (around 3/15/2023) for sympotms are not improving.    Baudilio Parker MD  [facility]    Dionte Hanley is a 47 year old, presenting for the following health issues:  Cough (Pt. Stated that notices cough for the last 3 months. )      Cough    History of Present Illness     Asthma:  She presents for follow up of asthma.  She has some cough, some wheezing, and no shortness of breath. She is using a relief medication 2-3 times per day. She typically misses taking her controller medication 1 time(s) per week.Patient is aware of the following triggers: cold air, emotions, exercise or sports and upper respiratory infections. The patient has not had a visit to the Emergency Room, Urgent Care or Hospital due to asthma since the last clinic visit.     She eats 4 or more servings of fruits and vegetables daily.She consumes 0 sweetened beverage(s) daily.She exercises with enough effort to increase her heart rate 30 to 60 minutes per day.  She exercises with enough effort to increase her heart rate 5 days per week.   She is taking medications regularly.       Nov 2022 had a virus with cough. Cough never  completely resolved and then had another cold in Feb 2022. Cold symptoms have resolved but cough is lingering. Cough is dry. A/s with shortness of breath with activity and some wheezing. No rhinorrhea, nasal congestion or PND. Restarted Flovent once daily in Nov 2022 and taking it more consisently Feb 2023. No home COVID test.     Review of Systems   Respiratory: Positive for cough.             Objective    /76 (BP Location: Right arm, Patient Position: Sitting, Cuff Size: Adult Large)   Pulse 74   Temp 98.1  F (36.7  C) (Temporal)   Resp 18   Wt 100.1 kg (220 lb 9.6 oz)   LMP 03/06/2023 (Exact Date)   SpO2 100%   BMI 37.87 kg/m    Body mass index is 37.87 kg/m .  Physical Exam   GENERAL: healthy, alert and no distress  RESP: lungs clear to auscultation - no rales, rhonchi or wheezes  CV: regular rate and rhythm, normal S1 S2

## 2023-06-27 ENCOUNTER — E-VISIT (OUTPATIENT)
Dept: FAMILY MEDICINE | Facility: CLINIC | Age: 48
End: 2023-06-27
Payer: COMMERCIAL

## 2023-06-27 DIAGNOSIS — H00.013 HORDEOLUM EXTERNUM OF RIGHT EYE, UNSPECIFIED EYELID: Primary | ICD-10-CM

## 2023-06-27 PROCEDURE — 99422 OL DIG E/M SVC 11-20 MIN: CPT | Performed by: NURSE PRACTITIONER

## 2023-06-27 RX ORDER — ERYTHROMYCIN 5 MG/G
0.5 OINTMENT OPHTHALMIC 2 TIMES DAILY
Qty: 3.5 G | Refills: 0 | Status: SHIPPED | OUTPATIENT
Start: 2023-06-27 | End: 2023-11-10

## 2023-07-30 ENCOUNTER — HEALTH MAINTENANCE LETTER (OUTPATIENT)
Age: 48
End: 2023-07-30

## 2023-11-07 ASSESSMENT — ENCOUNTER SYMPTOMS
ARTHRALGIAS: 0
PARESTHESIAS: 0
MYALGIAS: 0
HEMATOCHEZIA: 0
DIARRHEA: 0
BREAST MASS: 0
ABDOMINAL PAIN: 0
DIZZINESS: 0
SORE THROAT: 0
COUGH: 0
EYE PAIN: 0
CONSTIPATION: 0
HEADACHES: 1
HEMATURIA: 0
JOINT SWELLING: 0
NAUSEA: 0
FEVER: 0
PALPITATIONS: 0
FREQUENCY: 0
CHILLS: 0
WEAKNESS: 0
SHORTNESS OF BREATH: 0
DYSURIA: 0
HEARTBURN: 0
NERVOUS/ANXIOUS: 1

## 2023-11-07 ASSESSMENT — ASTHMA QUESTIONNAIRES: ACT_TOTALSCORE: 20

## 2023-11-10 ENCOUNTER — OFFICE VISIT (OUTPATIENT)
Dept: FAMILY MEDICINE | Facility: CLINIC | Age: 48
End: 2023-11-10
Payer: COMMERCIAL

## 2023-11-10 VITALS
HEART RATE: 86 BPM | DIASTOLIC BLOOD PRESSURE: 86 MMHG | OXYGEN SATURATION: 96 % | HEIGHT: 63 IN | RESPIRATION RATE: 16 BRPM | SYSTOLIC BLOOD PRESSURE: 120 MMHG | WEIGHT: 226 LBS | BODY MASS INDEX: 40.04 KG/M2 | TEMPERATURE: 98.2 F

## 2023-11-10 DIAGNOSIS — Z12.4 CERVICAL CANCER SCREENING: ICD-10-CM

## 2023-11-10 DIAGNOSIS — B37.2 CANDIDIASIS OF SKIN: ICD-10-CM

## 2023-11-10 DIAGNOSIS — Z13.220 LIPID SCREENING: ICD-10-CM

## 2023-11-10 DIAGNOSIS — Z12.11 SCREEN FOR COLON CANCER: ICD-10-CM

## 2023-11-10 DIAGNOSIS — F34.1 PERSISTENT DEPRESSIVE DISORDER: ICD-10-CM

## 2023-11-10 DIAGNOSIS — Z12.31 VISIT FOR SCREENING MAMMOGRAM: ICD-10-CM

## 2023-11-10 DIAGNOSIS — E89.0 POSTSURGICAL HYPOTHYROIDISM: ICD-10-CM

## 2023-11-10 DIAGNOSIS — Z00.00 ROUTINE GENERAL MEDICAL EXAMINATION AT A HEALTH CARE FACILITY: Primary | ICD-10-CM

## 2023-11-10 LAB
ALBUMIN SERPL BCG-MCNC: 3.9 G/DL (ref 3.5–5.2)
ALP SERPL-CCNC: 103 U/L (ref 35–104)
ALT SERPL W P-5'-P-CCNC: 25 U/L (ref 0–50)
ANION GAP SERPL CALCULATED.3IONS-SCNC: 9 MMOL/L (ref 7–15)
AST SERPL W P-5'-P-CCNC: 24 U/L (ref 0–45)
BILIRUB SERPL-MCNC: 0.8 MG/DL
BUN SERPL-MCNC: 13.1 MG/DL (ref 6–20)
CALCIUM SERPL-MCNC: 9.8 MG/DL (ref 8.6–10)
CHLORIDE SERPL-SCNC: 106 MMOL/L (ref 98–107)
CHOLEST SERPL-MCNC: 174 MG/DL
CREAT SERPL-MCNC: 0.67 MG/DL (ref 0.51–0.95)
DEPRECATED HCO3 PLAS-SCNC: 23 MMOL/L (ref 22–29)
EGFRCR SERPLBLD CKD-EPI 2021: >90 ML/MIN/1.73M2
ERYTHROCYTE [DISTWIDTH] IN BLOOD BY AUTOMATED COUNT: 12.4 % (ref 10–15)
GLUCOSE SERPL-MCNC: 106 MG/DL (ref 70–99)
HCT VFR BLD AUTO: 37.7 % (ref 35–47)
HDLC SERPL-MCNC: 65 MG/DL
HGB BLD-MCNC: 12.7 G/DL (ref 11.7–15.7)
LDLC SERPL CALC-MCNC: 92 MG/DL
MCH RBC QN AUTO: 28.7 PG (ref 26.5–33)
MCHC RBC AUTO-ENTMCNC: 33.7 G/DL (ref 31.5–36.5)
MCV RBC AUTO: 85 FL (ref 78–100)
NONHDLC SERPL-MCNC: 109 MG/DL
PLATELET # BLD AUTO: 284 10E3/UL (ref 150–450)
POTASSIUM SERPL-SCNC: 3.7 MMOL/L (ref 3.4–5.3)
PROT SERPL-MCNC: 7 G/DL (ref 6.4–8.3)
RBC # BLD AUTO: 4.43 10E6/UL (ref 3.8–5.2)
SODIUM SERPL-SCNC: 138 MMOL/L (ref 135–145)
T4 FREE SERPL-MCNC: 1.85 NG/DL (ref 0.9–1.7)
TRIGL SERPL-MCNC: 87 MG/DL
TSH SERPL DL<=0.005 MIU/L-ACNC: 0.03 UIU/ML (ref 0.3–4.2)
WBC # BLD AUTO: 7.2 10E3/UL (ref 4–11)

## 2023-11-10 PROCEDURE — 80061 LIPID PANEL: CPT | Performed by: NURSE PRACTITIONER

## 2023-11-10 PROCEDURE — 36415 COLL VENOUS BLD VENIPUNCTURE: CPT | Performed by: NURSE PRACTITIONER

## 2023-11-10 PROCEDURE — 84439 ASSAY OF FREE THYROXINE: CPT | Performed by: NURSE PRACTITIONER

## 2023-11-10 PROCEDURE — 84443 ASSAY THYROID STIM HORMONE: CPT | Performed by: NURSE PRACTITIONER

## 2023-11-10 PROCEDURE — 80053 COMPREHEN METABOLIC PANEL: CPT | Performed by: NURSE PRACTITIONER

## 2023-11-10 PROCEDURE — 85027 COMPLETE CBC AUTOMATED: CPT | Performed by: NURSE PRACTITIONER

## 2023-11-10 PROCEDURE — 99396 PREV VISIT EST AGE 40-64: CPT | Performed by: NURSE PRACTITIONER

## 2023-11-10 RX ORDER — LEVOTHYROXINE SODIUM 200 UG/1
200 TABLET ORAL DAILY
Qty: 90 TABLET | Refills: 3 | Status: SHIPPED | OUTPATIENT
Start: 2023-11-10 | End: 2024-02-18

## 2023-11-10 RX ORDER — NYSTATIN 100000 [USP'U]/G
POWDER TOPICAL 3 TIMES DAILY PRN
Qty: 60 G | Refills: 0 | Status: CANCELLED | OUTPATIENT
Start: 2023-11-10

## 2023-11-10 ASSESSMENT — ENCOUNTER SYMPTOMS
CONSTIPATION: 0
DIZZINESS: 0
WEAKNESS: 0
FREQUENCY: 0
ABDOMINAL PAIN: 0
CHILLS: 0
NERVOUS/ANXIOUS: 1
EYE PAIN: 0
SORE THROAT: 0
PARESTHESIAS: 0
SHORTNESS OF BREATH: 0
FEVER: 0
HEADACHES: 1
ARTHRALGIAS: 0
COUGH: 0
NAUSEA: 0
DYSURIA: 0
HEMATOCHEZIA: 0
BREAST MASS: 0
PALPITATIONS: 0
MYALGIAS: 0
JOINT SWELLING: 0
HEARTBURN: 0
DIARRHEA: 0
HEMATURIA: 0

## 2023-11-10 ASSESSMENT — PAIN SCALES - GENERAL: PAINLEVEL: NO PAIN (0)

## 2023-11-10 NOTE — PATIENT INSTRUCTIONS
Wellbutrin and cymbalta are other options.  I included information on these in your paperwork.      We'll try to get your pap and cologuard from [employer].        Preventive Health Recommendations  Female Ages 40 to 49    Yearly exam:   See your health care provider every year in order to  Review health changes.   Discuss preventive care.    Review your medicines if your doctor prescribed any.    Get a Pap test every three years (unless you have an abnormal result and your provider advises testing more often).    If you get Pap tests with HPV test, you only need to test every 5 years, unless you have an abnormal result. You do not need a Pap test if your uterus was removed (hysterectomy) and you have not had cancer.    You should be tested each year for STDs (sexually transmitted diseases), if you're at risk.   Ask your doctor if you should have a mammogram.    Have a colonoscopy (test for colon cancer) beginning at age 45.  Ask your provider about other options like a yearly FIT test or Cologuard test every 3 years (stool tests)      Have a cholesterol test every 5 years.     Have a diabetes test (fasting glucose) after age 45. If you are at risk for diabetes, you should have this test every 3 years.    Shots: Get a flu shot each year. Get a tetanus shot every 10 years.     Nutrition:   Eat at least 5 servings of fruits and vegetables each day.  Eat whole-grain bread, whole-wheat pasta and brown rice instead of white grains and rice.  Get adequate Calcium and Vitamin D.      Lifestyle  Exercise at least 150 minutes a week (an average of 30 minutes a day, 5 days a week). This will help you control your weight and prevent disease.  Limit alcohol to one drink per day.  No smoking.   Wear sunscreen to prevent skin cancer.  See your dentist every six months for an exam and cleaning.

## 2023-11-10 NOTE — LETTER
November 13, 2023      Dayan Madison  [address] [id number]        Dear ,    We are writing to inform you of your test results.    Thyroid levels look relatively stable. If not having symptoms of over-treatment like anxiety, jitteriness, insomnia, etc then we can leave this alone.     Kidneys, liver, electrolytes, cholesterol levels, and sugars look normal.     Tera Titus, CNP     Resulted Orders   TSH WITH FREE T4 REFLEX   Result Value Ref Range    TSH 0.03 (L) 0.30 - 4.20 uIU/mL   Comprehensive metabolic panel (BMP + Alb, Alk Phos, ALT, AST, Total. Bili, TP)   Result Value Ref Range    Sodium 138 135 - 145 mmol/L      Comment:      Reference intervals for this test were updated on 09/26/2023 to more accurately reflect our healthy population. There may be differences in the flagging of prior results with similar values performed with this method. Interpretation of those prior results can be made in the context of the updated reference intervals.     Potassium 3.7 3.4 - 5.3 mmol/L    Carbon Dioxide (CO2) 23 22 - 29 mmol/L    Anion Gap 9 7 - 15 mmol/L    Urea Nitrogen 13.1 6.0 - 20.0 mg/dL    Creatinine 0.67 0.51 - 0.95 mg/dL    GFR Estimate >90 >60 mL/min/1.73m2    Calcium 9.8 8.6 - 10.0 mg/dL    Chloride 106 98 - 107 mmol/L    Glucose 106 (H) 70 - 99 mg/dL    Alkaline Phosphatase 103 35 - 104 U/L    AST 24 0 - 45 U/L      Comment:      Reference intervals for this test were updated on 6/12/2023 to more accurately reflect our healthy population. There may be differences in the flagging of prior results with similar values performed with this method. Interpretation of those prior results can be made in the context of the updated reference intervals.    ALT 25 0 - 50 U/L      Comment:      Reference intervals for this test were updated on 6/12/2023 to more accurately reflect our healthy population. There may be differences in the flagging of prior results with similar  values performed with this method. Interpretation of those prior results can be made in the context of the updated reference intervals.      Protein Total 7.0 6.4 - 8.3 g/dL    Albumin 3.9 3.5 - 5.2 g/dL    Bilirubin Total 0.8 <=1.2 mg/dL   Lipid panel   Result Value Ref Range    Cholesterol 174 <200 mg/dL    Triglycerides 87 <150 mg/dL    Direct Measure HDL 65 >=50 mg/dL    LDL Cholesterol Calculated 92 <=100 mg/dL    Non HDL Cholesterol 109 <130 mg/dL    Narrative    Cholesterol  Desirable:  <200 mg/dL    Triglycerides  Normal:  Less than 150 mg/dL  Borderline High:  150-199 mg/dL  High:  200-499 mg/dL  Very High:  Greater than or equal to 500 mg/dL    Direct Measure HDL  Female:  Greater than or equal to 50 mg/dL   Male:  Greater than or equal to 40 mg/dL    LDL Cholesterol  Desirable:  <100mg/dL  Above Desirable:  100-129 mg/dL   Borderline High:  130-159 mg/dL   High:  160-189 mg/dL   Very High:  >= 190 mg/dL    Non HDL Cholesterol  Desirable:  130 mg/dL  Above Desirable:  130-159 mg/dL  Borderline High:  160-189 mg/dL  High:  190-219 mg/dL  Very High:  Greater than or equal to 220 mg/dL   CBC with platelets   Result Value Ref Range    WBC Count 7.2 4.0 - 11.0 10e3/uL    RBC Count 4.43 3.80 - 5.20 10e6/uL    Hemoglobin 12.7 11.7 - 15.7 g/dL    Hematocrit 37.7 35.0 - 47.0 %    MCV 85 78 - 100 fL    MCH 28.7 26.5 - 33.0 pg    MCHC 33.7 31.5 - 36.5 g/dL    RDW 12.4 10.0 - 15.0 %    Platelet Count 284 150 - 450 10e3/uL   T4 free   Result Value Ref Range    Free T4 1.85 (H) 0.90 - 1.70 ng/dL       If you have any questions or concerns, please call the clinic at the number listed above.       Sincerely,      Tera Titus NP

## 2023-11-10 NOTE — PROGRESS NOTES
SUBJECTIVE:   CC: Dayan is an 48 year old who presents for preventive health visit.       11/10/2023     2:23 PM   Additional Questions   Roomed by Vy Gavin REGINA     Has started to need to do cheater lenses with reading. Doing some weights and running.      Pap done about a year ago at [facility] and [facility]. Reported normal.       Healthy Habits:     Getting at least 3 servings of Calcium per day:  Yes    Bi-annual eye exam:  NO    Dental care twice a year:  Yes    Sleep apnea or symptoms of sleep apnea:  Daytime drowsiness    Diet:  Regular (no restrictions)    Frequency of exercise:  4-5 days/week    Duration of exercise:  30-45 minutes    Taking medications regularly:  Yes    Additional concerns today:  Yes      Social History     Tobacco Use    Smoking status: Never    Smokeless tobacco: Never   Substance Use Topics    Alcohol use: Yes         11/7/2023    12:00 PM   Alcohol Use   Prescreen: >3 drinks/day or >7 drinks/week? No     Reviewed orders with patient.  Reviewed health maintenance and updated orders accordingly - Yes  Lab work is in process    Breast Cancer Screening:        10/12/2022     8:42 PM 11/7/2023    12:02 PM   Breast CA Risk Assessment (FHS-7)   Do you have a family history of breast, colon, or ovarian cancer? Yes No / Unknown       Mammogram Screening: Recommended annual mammography  Pertinent mammograms are reviewed under the imaging tab.    History of abnormal Pap smear: NO - age 30-65 PAP every 5 years with negative HPV co-testing recommended     Reviewed and updated as needed this visit by clinical staff   Tobacco  Allergies  Meds              Reviewed and updated as needed this visit by Provider                 Past Medical History:   Diagnosis Date    Anxiety     Asthma     Depression     Disease of thyroid gland     Eczema     Gastroesophageal reflux disease     GERD (gastroesophageal reflux disease)     Obese     Sphincter of Oddi dysfunction     Thyroid cancer (H) 2000     "Uncomplicated asthma       Past Surgical History:   Procedure Laterality Date    CHOLECYSTECTOMY      ENDOSCOPIC RETROGRADE CHOLANGIOPANCREATOGRAM N/A 08/16/2019    Procedure: ENDOSCOPIC RETROGRADE CHOLANGIOPANCREATOGRAPHY bilinary  spinctomy;  Surgeon: Richard Wade MD;  Location[facility];  Service: Gastroenterology    ENDOSCOPIC RETROGRADE CHOLANGIOPANCREATOGRAM WITH SPYGLASS N/A 06/29/2021    Procedure: ENDOSCOPIC RETROGRADE CHOLANGIOPANCREATOGRAPHY, WITH DIRECT DUCT VISUALIZATION, USING PANCREATICOBILIARY FIBEROPTIC PROBE dilation, stent exchange;  Surgeon: Rodney Morales MD;  Location: [facility] OR    AR THYROIDECTOMY  01/01/2005    Thyroid Surgery Total Thyroidectomy;  Papillary thyroid cancer    XR ERCP BILIARY ONLY  08/16/2019       Review of Systems   Constitutional:  Negative for chills and fever.   HENT:  Negative for congestion, ear pain, hearing loss and sore throat.    Eyes:  Negative for pain and visual disturbance.   Respiratory:  Negative for cough and shortness of breath.    Cardiovascular:  Positive for peripheral edema. Negative for chest pain and palpitations.   Gastrointestinal:  Negative for abdominal pain, constipation, diarrhea, heartburn, hematochezia and nausea.   Breasts:  Negative for tenderness, breast mass and discharge.   Genitourinary:  Negative for dysuria, frequency, genital sores, hematuria, pelvic pain, urgency, vaginal bleeding and vaginal discharge.   Musculoskeletal:  Negative for arthralgias, joint swelling and myalgias.   Skin:  Negative for rash.   Neurological:  Positive for headaches. Negative for dizziness, weakness and paresthesias.   Psychiatric/Behavioral:  Negative for mood changes. The patient is nervous/anxious.         OBJECTIVE:   /86 (BP Location: Right arm, Patient Position: Sitting, Cuff Size: Adult Large)   Pulse 86   Temp 98.2  F (36.8  C) (Oral)   Resp 16   Ht 1.6 m (5' 3\")   Wt 102.5 kg (226 lb)   LMP 10/10/2023 (Approximate)   " SpO2 96%   BMI 40.03 kg/m    Physical Exam  GENERAL: healthy, alert and no distress  EYES: Eyes grossly normal to inspection, PERRL and conjunctivae and sclerae normal  HENT: ear canals and TM's normal, nose and mouth without ulcers or lesions  NECK: no adenopathy, no asymmetry, masses, or scars and thyroid normal to palpation  RESP: lungs clear to auscultation - no rales, rhonchi or wheezes  CV: regular rate and rhythm, normal S1 S2, no S3 or S4, no murmur, click or rub, no peripheral edema and peripheral pulses strong  ABDOMEN: soft, nontender, no hepatosplenomegaly, no masses and bowel sounds normal  MS: no gross musculoskeletal defects noted, no edema  SKIN: no suspicious lesions or rashes  NEURO: Normal strength and tone, mentation intact and speech normal  PSYCH: mentation appears normal, affect normal/bright    Diagnostic Test Results:  Labs reviewed in Epic    ASSESSMENT/PLAN:       ICD-10-CM    1. Routine general medical examination at a health care facility  Z00.00       2. Screen for colon cancer  Z12.11       3. Cervical cancer screening  Z12.4       4. Visit for screening mammogram  Z12.31 MA SCREENING DIGITAL BILAT - Future  (s+30)      5. Postsurgical hypothyroidism  E89.0 TSH WITH FREE T4 REFLEX     levothyroxine (SYNTHROID/LEVOTHROID) 200 MCG tablet     Comprehensive metabolic panel (BMP + Alb, Alk Phos, ALT, AST, Total. Bili, TP)     CBC with platelets     TSH WITH FREE T4 REFLEX     Comprehensive metabolic panel (BMP + Alb, Alk Phos, ALT, AST, Total. Bili, TP)     CBC with platelets      6. Depression with anxiety  F41.8 FLUoxetine (PROZAC) 20 MG capsule      7. Candidiasis of skin  B37.2       8. Lipid screening  Z13.220 Lipid panel     Lipid panel        Pap smear abstracted.  She says that she did a Cologuard through [facility] last year.  MISSY signed to try to get records of this.  She asks about a different antidepressant option.  Reviewed different choices.  She will get back to me.   "Update screening mammography.  Update annual labs.    Patient has been advised of split billing requirements and indicates understanding: No      COUNSELING:  Reviewed preventive health counseling, as reflected in patient instructions      BMI:   Estimated body mass index is 40.03 kg/m  as calculated from the following:    Height as of this encounter: 1.6 m (5' 3\").    Weight as of this encounter: 102.5 kg (226 lb).   Weight management plan: Discussed healthy diet and exercise guidelines      She reports that she has never smoked. She has never used smokeless tobacco.          Tera Titus NP  [facility]  "

## 2023-12-05 ENCOUNTER — APPOINTMENT (OUTPATIENT)
Dept: CT IMAGING | Facility: CLINIC | Age: 48
End: 2023-12-05
Attending: STUDENT IN AN ORGANIZED HEALTH CARE EDUCATION/TRAINING PROGRAM
Payer: COMMERCIAL

## 2023-12-05 ENCOUNTER — HOSPITAL ENCOUNTER (EMERGENCY)
Facility: CLINIC | Age: 48
Discharge: HOME | End: 2023-12-05
Attending: EMERGENCY MEDICINE | Admitting: EMERGENCY MEDICINE
Payer: COMMERCIAL

## 2023-12-05 VITALS
BODY MASS INDEX: 39.24 KG/M2 | TEMPERATURE: 98.9 F | WEIGHT: 221.5 LBS | RESPIRATION RATE: 22 BRPM | HEART RATE: 75 BPM | DIASTOLIC BLOOD PRESSURE: 83 MMHG | OXYGEN SATURATION: 97 % | SYSTOLIC BLOOD PRESSURE: 141 MMHG

## 2023-12-05 DIAGNOSIS — K29.70 GASTRITIS, PRESENCE OF BLEEDING UNSPECIFIED, UNSPECIFIED CHRONICITY, UNSPECIFIED GASTRITIS TYPE: ICD-10-CM

## 2023-12-05 DIAGNOSIS — R10.13 EPIGASTRIC PAIN: ICD-10-CM

## 2023-12-05 LAB
ALBUMIN SERPL BCG-MCNC: 3.7 G/DL (ref 3.5–5.2)
ALBUMIN UR-MCNC: 20 MG/DL
ALP SERPL-CCNC: 107 U/L (ref 40–150)
ALT SERPL W P-5'-P-CCNC: 33 U/L (ref 0–50)
ANION GAP SERPL CALCULATED.3IONS-SCNC: 11 MMOL/L (ref 7–15)
APPEARANCE UR: CLEAR
AST SERPL W P-5'-P-CCNC: 26 U/L (ref 0–45)
ATRIAL RATE - MUSE: 96 BPM
BASOPHILS # BLD AUTO: 0 10E3/UL (ref 0–0.2)
BASOPHILS NFR BLD AUTO: 0 %
BILIRUB DIRECT SERPL-MCNC: <0.2 MG/DL (ref 0–0.3)
BILIRUB SERPL-MCNC: 0.6 MG/DL
BILIRUB UR QL STRIP: NEGATIVE
BUN SERPL-MCNC: 11.8 MG/DL (ref 6–20)
CALCIUM SERPL-MCNC: 9.7 MG/DL (ref 8.6–10)
CHLORIDE SERPL-SCNC: 100 MMOL/L (ref 98–107)
COLOR UR AUTO: ABNORMAL
CREAT SERPL-MCNC: 0.62 MG/DL (ref 0.51–0.95)
DEPRECATED HCO3 PLAS-SCNC: 26 MMOL/L (ref 22–29)
DIASTOLIC BLOOD PRESSURE - MUSE: 107 MMHG
EGFRCR SERPLBLD CKD-EPI 2021: >90 ML/MIN/1.73M2
EOSINOPHIL # BLD AUTO: 0 10E3/UL (ref 0–0.7)
EOSINOPHIL NFR BLD AUTO: 0 %
ERYTHROCYTE [DISTWIDTH] IN BLOOD BY AUTOMATED COUNT: 12.1 % (ref 10–15)
GLUCOSE SERPL-MCNC: 113 MG/DL (ref 70–99)
GLUCOSE UR STRIP-MCNC: NEGATIVE MG/DL
HCG SERPL QL: NEGATIVE
HCT VFR BLD AUTO: 39.2 % (ref 35–47)
HGB BLD-MCNC: 13 G/DL (ref 11.7–15.7)
HGB UR QL STRIP: ABNORMAL
IMM GRANULOCYTES # BLD: 0 10E3/UL
IMM GRANULOCYTES NFR BLD: 0 %
INTERPRETATION ECG - MUSE: NORMAL
KETONES UR STRIP-MCNC: NEGATIVE MG/DL
LEUKOCYTE ESTERASE UR QL STRIP: NEGATIVE
LIPASE SERPL-CCNC: 32 U/L (ref 13–60)
LYMPHOCYTES # BLD AUTO: 1.6 10E3/UL (ref 0.8–5.3)
LYMPHOCYTES NFR BLD AUTO: 20 %
MCH RBC QN AUTO: 27.6 PG (ref 26.5–33)
MCHC RBC AUTO-ENTMCNC: 33.2 G/DL (ref 31.5–36.5)
MCV RBC AUTO: 83 FL (ref 78–100)
MONOCYTES # BLD AUTO: 0.8 10E3/UL (ref 0–1.3)
MONOCYTES NFR BLD AUTO: 10 %
NEUTROPHILS # BLD AUTO: 5.6 10E3/UL (ref 1.6–8.3)
NEUTROPHILS NFR BLD AUTO: 70 %
NITRATE UR QL: NEGATIVE
NRBC # BLD AUTO: 0 10E3/UL
NRBC BLD AUTO-RTO: 0 /100
P AXIS - MUSE: 47 DEGREES
PH UR STRIP: 6.5 [PH] (ref 5–7)
PLATELET # BLD AUTO: 232 10E3/UL (ref 150–450)
POTASSIUM SERPL-SCNC: 3.8 MMOL/L (ref 3.4–5.3)
PR INTERVAL - MUSE: 120 MS
PROT SERPL-MCNC: 7.3 G/DL (ref 6.4–8.3)
QRS DURATION - MUSE: 90 MS
QT - MUSE: 330 MS
QTC - MUSE: 416 MS
R AXIS - MUSE: -8 DEGREES
RBC # BLD AUTO: 4.71 10E6/UL (ref 3.8–5.2)
RBC URINE: 2 /HPF
SODIUM SERPL-SCNC: 137 MMOL/L (ref 135–145)
SP GR UR STRIP: 1.01 (ref 1–1.03)
SYSTOLIC BLOOD PRESSURE - MUSE: 174 MMHG
T AXIS - MUSE: 17 DEGREES
TROPONIN T SERPL HS-MCNC: 7 NG/L
UROBILINOGEN UR STRIP-MCNC: <2 MG/DL
VENTRICULAR RATE- MUSE: 96 BPM
WBC # BLD AUTO: 8.1 10E3/UL (ref 4–11)
WBC URINE: 1 /HPF

## 2023-12-05 PROCEDURE — 83690 ASSAY OF LIPASE: CPT | Performed by: STUDENT IN AN ORGANIZED HEALTH CARE EDUCATION/TRAINING PROGRAM

## 2023-12-05 PROCEDURE — 250N000011 HC RX IP 250 OP 636: Mod: JZ | Performed by: EMERGENCY MEDICINE

## 2023-12-05 PROCEDURE — 84703 CHORIONIC GONADOTROPIN ASSAY: CPT | Performed by: STUDENT IN AN ORGANIZED HEALTH CARE EDUCATION/TRAINING PROGRAM

## 2023-12-05 PROCEDURE — 93005 ELECTROCARDIOGRAM TRACING: CPT | Performed by: EMERGENCY MEDICINE

## 2023-12-05 PROCEDURE — 74177 CT ABD & PELVIS W/CONTRAST: CPT

## 2023-12-05 PROCEDURE — 96374 THER/PROPH/DIAG INJ IV PUSH: CPT | Mod: 59

## 2023-12-05 PROCEDURE — 99285 EMERGENCY DEPT VISIT HI MDM: CPT | Mod: 25

## 2023-12-05 PROCEDURE — 36415 COLL VENOUS BLD VENIPUNCTURE: CPT | Performed by: STUDENT IN AN ORGANIZED HEALTH CARE EDUCATION/TRAINING PROGRAM

## 2023-12-05 PROCEDURE — 84484 ASSAY OF TROPONIN QUANT: CPT | Performed by: STUDENT IN AN ORGANIZED HEALTH CARE EDUCATION/TRAINING PROGRAM

## 2023-12-05 PROCEDURE — 250N000013 HC RX MED GY IP 250 OP 250 PS 637: Performed by: STUDENT IN AN ORGANIZED HEALTH CARE EDUCATION/TRAINING PROGRAM

## 2023-12-05 PROCEDURE — 80053 COMPREHEN METABOLIC PANEL: CPT | Performed by: STUDENT IN AN ORGANIZED HEALTH CARE EDUCATION/TRAINING PROGRAM

## 2023-12-05 PROCEDURE — 85025 COMPLETE CBC W/AUTO DIFF WBC: CPT | Performed by: STUDENT IN AN ORGANIZED HEALTH CARE EDUCATION/TRAINING PROGRAM

## 2023-12-05 PROCEDURE — 96375 TX/PRO/DX INJ NEW DRUG ADDON: CPT

## 2023-12-05 PROCEDURE — 250N000011 HC RX IP 250 OP 636: Mod: JZ | Performed by: STUDENT IN AN ORGANIZED HEALTH CARE EDUCATION/TRAINING PROGRAM

## 2023-12-05 PROCEDURE — 82248 BILIRUBIN DIRECT: CPT | Performed by: STUDENT IN AN ORGANIZED HEALTH CARE EDUCATION/TRAINING PROGRAM

## 2023-12-05 PROCEDURE — 81001 URINALYSIS AUTO W/SCOPE: CPT | Performed by: STUDENT IN AN ORGANIZED HEALTH CARE EDUCATION/TRAINING PROGRAM

## 2023-12-05 RX ORDER — FAMOTIDINE 40 MG/1
40 TABLET, FILM COATED ORAL DAILY
Qty: 14 TABLET | Refills: 0 | Status: SHIPPED | OUTPATIENT
Start: 2023-12-05 | End: 2023-12-19

## 2023-12-05 RX ORDER — MAGNESIUM HYDROXIDE/ALUMINUM HYDROXICE/SIMETHICONE 120; 1200; 1200 MG/30ML; MG/30ML; MG/30ML
30 SUSPENSION ORAL ONCE
Status: COMPLETED | OUTPATIENT
Start: 2023-12-05 | End: 2023-12-05

## 2023-12-05 RX ORDER — KETOROLAC TROMETHAMINE 15 MG/ML
15 INJECTION, SOLUTION INTRAMUSCULAR; INTRAVENOUS ONCE
Status: COMPLETED | OUTPATIENT
Start: 2023-12-05 | End: 2023-12-05

## 2023-12-05 RX ORDER — ONDANSETRON 2 MG/ML
4 INJECTION INTRAMUSCULAR; INTRAVENOUS ONCE
Status: COMPLETED | OUTPATIENT
Start: 2023-12-05 | End: 2023-12-05

## 2023-12-05 RX ORDER — IOPAMIDOL 755 MG/ML
90 INJECTION, SOLUTION INTRAVASCULAR ONCE
Status: COMPLETED | OUTPATIENT
Start: 2023-12-05 | End: 2023-12-05

## 2023-12-05 RX ORDER — FAMOTIDINE 10 MG
10 TABLET ORAL ONCE
Status: COMPLETED | OUTPATIENT
Start: 2023-12-05 | End: 2023-12-05

## 2023-12-05 RX ORDER — SUCRALFATE 1 G/1
1 TABLET ORAL 4 TIMES DAILY
Qty: 12 TABLET | Refills: 0 | Status: SHIPPED | OUTPATIENT
Start: 2023-12-05 | End: 2023-12-08

## 2023-12-05 RX ADMIN — ONDANSETRON 4 MG: 2 INJECTION INTRAMUSCULAR; INTRAVENOUS at 10:23

## 2023-12-05 RX ADMIN — FAMOTIDINE 10 MG: 10 TABLET, FILM COATED ORAL at 10:50

## 2023-12-05 RX ADMIN — IOPAMIDOL 90 ML: 755 INJECTION, SOLUTION INTRAVENOUS at 12:27

## 2023-12-05 RX ADMIN — KETOROLAC TROMETHAMINE 15 MG: 15 INJECTION, SOLUTION INTRAMUSCULAR; INTRAVENOUS at 11:12

## 2023-12-05 ASSESSMENT — ACTIVITIES OF DAILY LIVING (ADL)
ADLS_ACUITY_SCORE: 35
ADLS_ACUITY_SCORE: 35

## 2023-12-05 NOTE — ED PROVIDER NOTES
Emergency Department Encounter   NAME: Dayan Madison ; AGE: 48 year old female ; YOB: 1975 ; MRN: [id number] ; PCP: Tera Titus   ED PROVIDER: Ofe Jimenez PA-C    Evaluation Date & Time:   12/5/2023  9:32 AM    CHIEF COMPLAINT:  GI Problem        Impression and Plan   FINAL IMPRESSION:    ICD-10-CM    1. Epigastric pain  R10.13       2. Gastritis, presence of bleeding unspecified, unspecified chronicity, unspecified gastritis type  K29.70           MDM:  Dayan Madison is a 48 year old female with PMH of biliary stenosis and biliary duct strictures with stent placement in 2021, cholecystectomy, diaphragmatic hernia, hypothyroidism, and asthma presenting to the emergency department for evaluation of epigastric pain since Friday (5 days). She state the pain occasionally radiates up to her chest and into her mid back. The pain is episodic, denies any known triggers. Pain is not worse with eating or bowel movement. She states she has been nauseous, no vomiting. She endorses on and off fevers for the past few days as high as 102 F. She states she is no longer taking omeprazole as she had been doing very well. She has been taking tylenol and ibuprofen together to help with pain. Denies any vomiting of blood or blood in the stool or dark tarry stools.     Vitals reviewed and unremarkable aside from a BP of 174/107. On exam she is resting comfortably, she is not ill-appearing or diaphoretic.     Differential diagnosis includes but not limited to PUD, gastritis, biliary tract stricture, pancreatitis, kidney stone, pyelonephritis, ACS. She was given Pepcid, Zofran, and Toradol for symptoms with complete resolution of symptoms. She declined the GI cocktail as she states this never works for her.  Lab work today is relatively unremarkable. Troponin is 7, which was not repeated as it is within her gender reference range and her pain began 5 days ago. EKG found normal sinus rhythm without evidence of  ischemia. Given negative EKG and trop ACS is unlikely. No leukocytosis or anemia, her hemoglobin is 13. Creatinine and liver functions within normal range. Her urine found a small amount of blood, no evidence of infection. CT abdomen pelvis found no acute findings to explain her symptoms. There is a 3 mm right renal stone that is nonobstructing. Scan also found stable mild biliary ductal dilation and stable pneumobilia consistent with previous scans, no choledocholithiasis. I think her symptoms are most consistent with PUD/gastritis. I do not suspect upper GI bleed today as she has no hematemesis or melena and no evidence of anemia. I recommended she call [facility] after discharge today to schedule follow up. Also recommended restarting daily omeprazole and she was given a prescription for famotidine to add for the next 2 weeks as well as carafate. We discussed reasons to return to the ED, she is understanding and agreeable to this plan.       Medical Decision Making    History:  Supplemental history from: Documented in chart, if applicable  External Record(s) reviewed: I personally performed chart review of it from 12/5/2023    Work Up:  Chart documentation includes differential considered and any EKGs or imaging independently interpreted by provider, where specified.  In additional to work up documented, I considered the following work up: Documented in chart, if applicable.    External consultation:  Discussion of management with another provider: Dr. Goyal    Complicating factors:  Care impacted by chronic illness: N/A  Care affected by social determinants of health: N/A    Disposition considerations: Discharge. I prescribed additional prescription strength medication(s) as charted. See documentation for any additional details.      ED COURSE:  9:58 AM I met and introduced myself to the patient. I gathered initial history and performed my physical exam. We discussed plan for initial workup.   10:12 AM I have  staffed the patient with Dr. Goyal, ED MD, who has evaluated the patient and agrees with all aspects of today's care.   1:36 PM I rechecked the patient and discussed results, discharge, follow up, and reasons to return to the ED.     At the conclusion of the encounter I discussed the results of all the tests and the disposition. The questions were answered. The patient or family acknowledged understanding and was agreeable with the care plan.        MEDICATIONS GIVEN IN THE EMERGENCY DEPARTMENT:  Medications   ondansetron (ZOFRAN) injection 4 mg (4 mg Intravenous $Given 12/5/23 1023)   famotidine (PEPCID) tablet 10 mg (10 mg Oral $Given 12/5/23 1050)   alum & mag hydroxide-simethicone (MAALOX) suspension 30 mL (30 mLs Oral Not Given 12/5/23 1023)   ketorolac (TORADOL) injection 15 mg (15 mg Intravenous $Given 12/5/23 1112)   iopamidol (ISOVUE-370) solution 90 mL (90 mLs Intravenous $Given 12/5/23 1227)         NEW PRESCRIPTIONS STARTED AT TODAY'S ED VISIT:  Discharge Medication List as of 12/5/2023  1:36 PM        START taking these medications    Details   famotidine (PEPCID) 40 MG tablet Take 1 tablet (40 mg) by mouth daily for 14 days, Disp-14 tablet, R-0, E-Prescribe      sucralfate (CARAFATE) 1 GM tablet Take 1 tablet (1 g) by mouth 4 times daily for 3 days, Disp-12 tablet, R-0, E-Prescribe               HPI   Patient information was obtained from: patient  Use of Intrepreter: N/A     Dayan Madison is a 48 year old female with PMH of biliary stenosis and biliary duct strictures with stent placement in 2021, cholecystectomy, diaphragmatic hernia, hypothyroidism, and asthma presenting to the emergency department for evaluation of epigastric pain since Friday. She state the pain occasionally radiates up to her chest and into her mid back. The pain is episodic, denies any known triggers. Pain is not worse with eating or bowel movement. She states she has been nauseous, no vomiting. She endorses on and off  fevers for the past few days as high as 102 F. She states she is no longer taking omeprazole as she had been doing very well. She has been taking tylenol and ibuprofen together to help with pain. Denies any vomiting of blood or blood in the stool or dark tarry stools.       Medical History     Past Medical History:   Diagnosis Date    Anxiety     Asthma     Depression     Disease of thyroid gland     Eczema     Gastroesophageal reflux disease     GERD (gastroesophageal reflux disease)     Obese     Sphincter of Oddi dysfunction     Thyroid cancer (H) 2000    Uncomplicated asthma        Past Surgical History:   Procedure Laterality Date    CHOLECYSTECTOMY      ENDOSCOPIC RETROGRADE CHOLANGIOPANCREATOGRAM N/A 2019    Procedure: ENDOSCOPIC RETROGRADE CHOLANGIOPANCREATOGRAPHY bilinary  spinctomy;  Surgeon: Richard Wade MD;  Location: [facility];  Service: Gastroenterology    ENDOSCOPIC RETROGRADE CHOLANGIOPANCREATOGRAM WITH SPYGLASS N/A 2021    Procedure: ENDOSCOPIC RETROGRADE CHOLANGIOPANCREATOGRAPHY, WITH DIRECT DUCT VISUALIZATION, USING PANCREATICOBILIARY FIBEROPTIC PROBE dilation, stent exchange;  Surgeon: Rodney Morales MD;  Location:  OR    ME THYROIDECTOMY  2005    Thyroid Surgery Total Thyroidectomy;  Papillary thyroid cancer    XR ERCP BILIARY ONLY  2019       Family History   Problem Relation Age of Onset    Heart Disease Maternal Grandmother          in her 80's    Breast Cancer Maternal Aunt     Breast Cancer Maternal Aunt     Factor V Leiden deficiency Mother        Social History     Tobacco Use    Smoking status: Never    Smokeless tobacco: Never   Vaping Use    Vaping Use: Never used   Substance Use Topics    Alcohol use: Yes    Drug use: Never       famotidine (PEPCID) 40 MG tablet  sucralfate (CARAFATE) 1 GM tablet  albuterol (PROAIR HFA/PROVENTIL HFA/VENTOLIN HFA) 108 (90 Base) MCG/ACT inhaler  FLUoxetine (PROZAC) 20 MG capsule  fluticasone (FLOVENT  HFA) 220 MCG/ACT inhaler  levothyroxine (SYNTHROID/LEVOTHROID) 200 MCG tablet  nystatin (MYCOSTATIN) 923772 UNIT/GM external powder  triamcinolone (ARISTOCORT HP) 0.5 % external cream          Physical Exam     First Vitals:  Patient Vitals for the past 24 hrs:   BP Temp Temp src Pulse Resp SpO2 Weight   12/05/23 1340 (!) 141/83 -- -- 75 -- 97 % --   12/05/23 1244 -- -- -- 70 -- 96 % --   12/05/23 1200 -- -- -- 77 -- 96 % --   12/05/23 1101 (!) 157/80 -- -- 75 -- 99 % --   12/05/23 1007 (!) 147/93 -- -- 88 -- 97 % --   12/05/23 0946 (!) 151/99 -- -- 90 -- 97 % --   12/05/23 0937 (!) 148/102 -- -- 92 -- 94 % --   12/05/23 0928 (!) 174/107 98.9  F (37.2  C) Oral 90 22 97 % 100.5 kg (221 lb 8 oz)         PHYSICAL EXAM    General Appearance:  Alert, cooperative, no distress, appears stated age  Respiratory: No distress. Lungs clear to ausculation bilaterally. No wheezes, rhonchi or stridor  Cardiovascular: Regular rate and rhythm, no murmur. Normal cap refill. No peripheral edema  GI: Abdomen soft with reproducible tenderness in the epigastric region. The rest of the abdomen is nontender.  No overlying ecchymosis.  : No CVA tenderness  Musculoskeletal: Moving all extremities. No gross deformities  Integument: Warm, dry, no rashes or lesions  Neurologic: Alert and orientated x3. No focal deficits.  Psych: Normal mood and affect        Results     LAB:  All pertinent labs reviewed and interpreted  Labs Ordered and Resulted from Time of ED Arrival to Time of ED Departure   BASIC METABOLIC PANEL - Abnormal       Result Value    Sodium 137      Potassium 3.8      Chloride 100      Carbon Dioxide (CO2) 26      Anion Gap 11      Urea Nitrogen 11.8      Creatinine 0.62      GFR Estimate >90      Calcium 9.7      Glucose 113 (*)    ROUTINE UA WITH MICROSCOPIC REFLEX TO CULTURE - Abnormal    Color Urine Light Yellow      Appearance Urine Clear      Glucose Urine Negative      Bilirubin Urine Negative      Ketones Urine Negative       Specific Gravity Urine 1.010      Blood Urine 0.03 mg/dL (*)     pH Urine 6.5      Protein Albumin Urine 20 (*)     Urobilinogen Urine <2.0      Nitrite Urine Negative      Leukocyte Esterase Urine Negative      RBC Urine 2      WBC Urine 1     TROPONIN T, HIGH SENSITIVITY - Normal    Troponin T, High Sensitivity 7     HEPATIC FUNCTION PANEL - Normal    Protein Total 7.3      Albumin 3.7      Bilirubin Total 0.6      Alkaline Phosphatase 107      AST 26      ALT 33      Bilirubin Direct <0.20     LIPASE - Normal    Lipase 32     HCG QUALITATIVE PREGNANCY - Normal    hCG Serum Qualitative Negative     CBC WITH PLATELETS AND DIFFERENTIAL    WBC Count 8.1      RBC Count 4.71      Hemoglobin 13.0      Hematocrit 39.2      MCV 83      MCH 27.6      MCHC 33.2      RDW 12.1      Platelet Count 232      % Neutrophils 70      % Lymphocytes 20      % Monocytes 10      % Eosinophils 0      % Basophils 0      % Immature Granulocytes 0      NRBCs per 100 WBC 0      Absolute Neutrophils 5.6      Absolute Lymphocytes 1.6      Absolute Monocytes 0.8      Absolute Eosinophils 0.0      Absolute Basophils 0.0      Absolute Immature Granulocytes 0.0      Absolute NRBCs 0.0         RADIOLOGY:  CT Abdomen Pelvis w Contrast   Final Result   IMPRESSION:    1.  No acute findings to explain the patient's symptoms. No inflammatory process, hydronephrosis or bowel obstruction.   2.  Cholecystectomy with stable mild biliary ductal dilatation and stable pneumobilia. No evidence for choledocholithiasis.   3.  Nonobstructing 3 mm right renal stone.            ECG:  Performed at: 9:30    Impression: sinus rhythm    Rate: 96  Rhythm: sinus  Axis: normal  WI Interval: 120  QRS Interval: 90  QTc Interval: 416  ST Changes: no ST changes  Comparison: no significant change    EKG results reviewed and interpreted by Dr. Kumari, ED MD.           Ofe Jimenez PA-C   Emergency Medicine   [facility] EMERGENCY ROOM        Ofe Jimenez PA-C  12/05/23 2108       Ofe Jimenez PA-C  12/05/23 2110       Ofe Jimenez PA-C  12/05/23 2111

## 2023-12-05 NOTE — ED PROVIDER NOTES
Emergency Department Midlevel Supervisory Note     I personally saw the patient and performed a substantive portion of the visit including all aspects of the medical decision making.    ED Course:  10:15 AM Ofe Jimenez PA-C staffed patient with me. I agree with their assessment and plan of management, and I will see the patient. I met with the patient to introduce myself, gather additional history, perform my initial exam, and discuss the plan.     Brief HPI:     Dayan Madison is a 48 year old female who presents for evaluation of epigastric pain.    The patient reports 5 days of episodic epigastric abdominal pain that occasionally radiates up into her chest and through to her mid back. The pain is not worse with eating or when having a bowel movement. She has been taking Tylenol and ibuprofen for the pain without significant improvement. She has not had any vomiting, diarrhea, or bloody or black stool. The pain feels somewhat similar to when she has had to have biliary dilations in the past. She used to be on omeprazole for acid reflux, but recently stopped the medication and has been doing well.     I, Hema Feng, am serving as a scribe to document services personally performed by Dr. Farrukh Goyal based on my observations and the provider's statements to me.   I, Farrukh Goyal MD, attest that Hema Feng was acting in a scribe capacity, has observed my performance of the services and has documented them in accordance with my direction.    Brief Physical Exam: BP (!) 141/83   Pulse 75   Temp 98.9  F (37.2  C) (Oral)   Resp 22   Wt 100.5 kg (221 lb 8 oz)   LMP 11/10/2023 (Approximate)   SpO2 97%   BMI 39.24 kg/m    Constitutional:  Alert, in no acute distress  EYES: Conjunctivae clear  HENT:  Atraumatic, normocephalic  Respiratory:  Respirations even, unlabored, in no acute respiratory distress  Cardiovascular:  Regular rate and rhythm, good peripheral perfusion  GI: Soft, nondistended,  nontender, no palpable masses, no rebound, no guarding   Musculoskeletal:  No edema. No cyanosis. Range of motion major extremities intact.    Integument: Warm, Dry, No erythema, No rash.   Neurologic:  Alert & oriented, no focal deficits noted  Psych: Normal mood and affect     MDM:    ED Course as of 12/05/23 1539   Tue Dec 05, 2023   1007 47 yo F with epigastric pain radiating to her back. Hx of biliary stenosis with dilations and stent placement. S/p cholecystectomy.    1259 CT Abdomen Pelvis w Contrast  1.  No acute findings to explain the patient's symptoms. No inflammatory process, hydronephrosis or bowel obstruction.  2.  Cholecystectomy with stable mild biliary ductal dilatation and stable pneumobilia. No evidence for choledocholithiasis.  3.  Nonobstructing 3 mm right renal stone.   Patient had significant relief with Toradol.  CT scan was performed to rule out infected proximal kidney stone, but was thankfully normal.  No fevers here.  Suspect gastritis, but possible sphincter of Oddi dysfunction, combination of PPI, H2 blocker, Carafate and low-dose ibuprofen/Tylenol was prescribed with PCP and GI follow-up.      1. Epigastric pain    2. Gastritis, presence of bleeding unspecified, unspecified chronicity, unspecified gastritis type        Labs and Imaging:  Results for orders placed or performed during the hospital encounter of 12/05/23   CT Abdomen Pelvis w Contrast    Impression    IMPRESSION:   1.  No acute findings to explain the patient's symptoms. No inflammatory process, hydronephrosis or bowel obstruction.  2.  Cholecystectomy with stable mild biliary ductal dilatation and stable pneumobilia. No evidence for choledocholithiasis.  3.  Nonobstructing 3 mm right renal stone.   Troponin T, High Sensitivity (now)   Result Value Ref Range    Troponin T, High Sensitivity 7 <=14 ng/L   Basic metabolic panel   Result Value Ref Range    Sodium 137 135 - 145 mmol/L    Potassium 3.8 3.4 - 5.3 mmol/L     Chloride 100 98 - 107 mmol/L    Carbon Dioxide (CO2) 26 22 - 29 mmol/L    Anion Gap 11 7 - 15 mmol/L    Urea Nitrogen 11.8 6.0 - 20.0 mg/dL    Creatinine 0.62 0.51 - 0.95 mg/dL    GFR Estimate >90 >60 mL/min/1.73m2    Calcium 9.7 8.6 - 10.0 mg/dL    Glucose 113 (H) 70 - 99 mg/dL   Hepatic function panel   Result Value Ref Range    Protein Total 7.3 6.4 - 8.3 g/dL    Albumin 3.7 3.5 - 5.2 g/dL    Bilirubin Total 0.6 <=1.2 mg/dL    Alkaline Phosphatase 107 40 - 150 U/L    AST 26 0 - 45 U/L    ALT 33 0 - 50 U/L    Bilirubin Direct <0.20 0.00 - 0.30 mg/dL   Result Value Ref Range    Lipase 32 13 - 60 U/L   HCG QUALitative pregnancy (blood)   Result Value Ref Range    hCG Serum Qualitative Negative Negative   CBC with platelets and differential   Result Value Ref Range    WBC Count 8.1 4.0 - 11.0 10e3/uL    RBC Count 4.71 3.80 - 5.20 10e6/uL    Hemoglobin 13.0 11.7 - 15.7 g/dL    Hematocrit 39.2 35.0 - 47.0 %    MCV 83 78 - 100 fL    MCH 27.6 26.5 - 33.0 pg    MCHC 33.2 31.5 - 36.5 g/dL    RDW 12.1 10.0 - 15.0 %    Platelet Count 232 150 - 450 10e3/uL    % Neutrophils 70 %    % Lymphocytes 20 %    % Monocytes 10 %    % Eosinophils 0 %    % Basophils 0 %    % Immature Granulocytes 0 %    NRBCs per 100 WBC 0 <1 /100    Absolute Neutrophils 5.6 1.6 - 8.3 10e3/uL    Absolute Lymphocytes 1.6 0.8 - 5.3 10e3/uL    Absolute Monocytes 0.8 0.0 - 1.3 10e3/uL    Absolute Eosinophils 0.0 0.0 - 0.7 10e3/uL    Absolute Basophils 0.0 0.0 - 0.2 10e3/uL    Absolute Immature Granulocytes 0.0 <=0.4 10e3/uL    Absolute NRBCs 0.0 10e3/uL   UA with Microscopic reflex to Culture    Specimen: Urine, Clean Catch   Result Value Ref Range    Color Urine Light Yellow Colorless, Straw, Light Yellow, Yellow    Appearance Urine Clear Clear    Glucose Urine Negative Negative mg/dL    Bilirubin Urine Negative Negative    Ketones Urine Negative Negative mg/dL    Specific Gravity Urine 1.010 1.001 - 1.030    Blood Urine 0.03 mg/dL (A) Negative    pH Urine  6.5 5.0 - 7.0    Protein Albumin Urine 20 (A) Negative mg/dL    Urobilinogen Urine <2.0 <2.0 mg/dL    Nitrite Urine Negative Negative    Leukocyte Esterase Urine Negative Negative    RBC Urine 2 <=2 /HPF    WBC Urine 1 <=5 /HPF   ECG 12-LEAD WITH MUSE (LHE)   Result Value Ref Range    Systolic Blood Pressure 174 mmHg    Diastolic Blood Pressure 107 mmHg    Ventricular Rate 96 BPM    Atrial Rate 96 BPM    ND Interval 120 ms    QRS Duration 90 ms     ms    QTc 416 ms    P Axis 47 degrees    R AXIS -8 degrees    T Axis 17 degrees    Interpretation ECG       Sinus rhythm  Voltage criteria for left ventricular hypertrophy  Abnormal ECG  When compared with ECG of 16-JUN-2019 00:35,  No significant change was found  Confirmed by SEE ED PROVIDER NOTE FOR, ECG INTERPRETATION ([id number]),  SUZANNE PLEAYO ([id number]) on 12/5/2023 2:35:22 PM       I have reviewed the relevant laboratory and radiology studies    Procedures:  I was present for the key portions of this procedure: none    Farrukh Goyal MD  [facility] EMERGENCY ROOM  [address] [facility] [address]  [phone]     Farrukh Goyal MD  12/05/23 6461

## 2023-12-05 NOTE — DISCHARGE INSTRUCTIONS
There is no obstructing kidney stone, or any change in your common bile duct based on last CT scan.  No bacterial infection infection that would indicate the need for antibiotics.  Aside from having it small amount of blood, your urine was also normal.    Please take your omeprazole, and add on the famotidine for the next 2 weeks.  For the next few days also start Carafate.  This will help protect her stomach in case some of your pain is due to a nonbleeding ulcer.  Otherwise reach out to gastroenterology for follow-up.  If ibuprofen is helping then I recommend 400 mg at a time, but no more than 2 doses per day.    You were seen in the emergency department today for epigastric pain and nausea.    You may take Tylenol 325-1000 mg by mouth every 4-6 hours as needed for pain. Do not exceed 1000mg (1g) in 4 hours or 4 g/day from all sources.    Please return to the emergency department if you develop any severe worsened abdominal pain or chest pain, develop dark tarry stools or blood in the stool, or vomiting blood or vomit is similar to coffee grounds.

## 2023-12-05 NOTE — ED TRIAGE NOTES
Pain started Friday in gastric area thru to back.  Has had her GB removed and has history of intestinal issues.  Has nausea but no vomiting     Triage Assessment (Adult)       Row Name 12/05/23 7458          Triage Assessment    Airway WDL WDL        Respiratory WDL    Respiratory WDL rhythm/pattern     Rhythm/Pattern, Respiratory deep        Cardiac WDL    Cardiac WDL WDL        Peripheral/Neurovascular WDL    Peripheral Neurovascular WDL WDL        Cognitive/Neuro/Behavioral WDL    Cognitive/Neuro/Behavioral WDL WDL

## 2024-01-11 ENCOUNTER — E-VISIT (OUTPATIENT)
Dept: FAMILY MEDICINE | Facility: CLINIC | Age: 49
End: 2024-01-11
Payer: COMMERCIAL

## 2024-01-11 DIAGNOSIS — H57.12 EYE PAIN, LEFT: Primary | ICD-10-CM

## 2024-01-11 PROCEDURE — 99207 PR NON-BILLABLE SERV PER CHARTING: CPT | Performed by: NURSE PRACTITIONER

## 2024-01-11 NOTE — PATIENT INSTRUCTIONS
Dear Dayan Madison,    We are sorry you are not feeling well. Based on the responses you provided, it is recommended that you be seen in-person in urgent care so we can better evaluate your symptoms. Please click here to find the nearest urgent care location to you.   You will not be charged for this Visit. Thank you for trusting us with your care.    Gayle Rodriguez NP

## 2024-02-07 ENCOUNTER — HOSPITAL ENCOUNTER (OUTPATIENT)
Dept: MAMMOGRAPHY | Facility: CLINIC | Age: 49
Discharge: HOME | End: 2024-02-07
Attending: NURSE PRACTITIONER | Admitting: NURSE PRACTITIONER
Payer: COMMERCIAL

## 2024-02-07 DIAGNOSIS — Z12.31 VISIT FOR SCREENING MAMMOGRAM: ICD-10-CM

## 2024-02-07 PROCEDURE — 77067 SCR MAMMO BI INCL CAD: CPT

## 2024-03-25 ENCOUNTER — APPOINTMENT (OUTPATIENT)
Dept: MRI IMAGING | Facility: HOSPITAL | Age: 49
End: 2024-03-25
Attending: EMERGENCY MEDICINE
Payer: COMMERCIAL

## 2024-03-25 ENCOUNTER — HOSPITAL ENCOUNTER (OUTPATIENT)
Facility: HOSPITAL | Age: 49
Setting detail: OBSERVATION
Discharge: HOME | End: 2024-03-26
Attending: EMERGENCY MEDICINE | Admitting: EMERGENCY MEDICINE
Payer: COMMERCIAL

## 2024-03-25 ENCOUNTER — APPOINTMENT (OUTPATIENT)
Dept: CT IMAGING | Facility: HOSPITAL | Age: 49
End: 2024-03-25
Attending: EMERGENCY MEDICINE
Payer: COMMERCIAL

## 2024-03-25 DIAGNOSIS — R10.11 RIGHT UPPER QUADRANT ABDOMINAL PAIN: ICD-10-CM

## 2024-03-25 PROBLEM — K83.8 DILATION OF BILIARY TRACT: Status: ACTIVE | Noted: 2021-06-08

## 2024-03-25 LAB
ALBUMIN SERPL BCG-MCNC: 3.3 G/DL (ref 3.5–5.2)
ALBUMIN UR-MCNC: 70 MG/DL
ALP SERPL-CCNC: 143 U/L (ref 40–150)
ALT SERPL W P-5'-P-CCNC: 43 U/L (ref 0–50)
ANION GAP SERPL CALCULATED.3IONS-SCNC: 11 MMOL/L (ref 7–15)
APPEARANCE UR: CLEAR
AST SERPL W P-5'-P-CCNC: 31 U/L (ref 0–45)
BASOPHILS # BLD AUTO: 0 10E3/UL (ref 0–0.2)
BASOPHILS NFR BLD AUTO: 0 %
BILIRUB DIRECT SERPL-MCNC: <0.2 MG/DL (ref 0–0.3)
BILIRUB SERPL-MCNC: 0.9 MG/DL
BILIRUB UR QL STRIP: NEGATIVE
BUN SERPL-MCNC: 9.4 MG/DL (ref 6–20)
CALCIUM SERPL-MCNC: 9.4 MG/DL (ref 8.6–10)
CHLORIDE SERPL-SCNC: 102 MMOL/L (ref 98–107)
COLOR UR AUTO: YELLOW
CREAT SERPL-MCNC: 0.65 MG/DL (ref 0.51–0.95)
CRP SERPL-MCNC: 154.6 MG/L
DEPRECATED HCO3 PLAS-SCNC: 25 MMOL/L (ref 22–29)
EGFRCR SERPLBLD CKD-EPI 2021: >90 ML/MIN/1.73M2
EOSINOPHIL # BLD AUTO: 0 10E3/UL (ref 0–0.7)
EOSINOPHIL NFR BLD AUTO: 0 %
ERYTHROCYTE [DISTWIDTH] IN BLOOD BY AUTOMATED COUNT: 12.8 % (ref 10–15)
GLUCOSE SERPL-MCNC: 100 MG/DL (ref 70–99)
GLUCOSE UR STRIP-MCNC: NEGATIVE MG/DL
HCG UR QL: NEGATIVE
HCT VFR BLD AUTO: 37.6 % (ref 35–47)
HGB BLD-MCNC: 12.1 G/DL (ref 11.7–15.7)
HGB UR QL STRIP: NEGATIVE
IMM GRANULOCYTES # BLD: 0 10E3/UL
IMM GRANULOCYTES NFR BLD: 0 %
KETONES UR STRIP-MCNC: NEGATIVE MG/DL
LEUKOCYTE ESTERASE UR QL STRIP: NEGATIVE
LIPASE SERPL-CCNC: 36 U/L (ref 13–60)
LYMPHOCYTES # BLD AUTO: 1.9 10E3/UL (ref 0.8–5.3)
LYMPHOCYTES NFR BLD AUTO: 25 %
MAGNESIUM SERPL-MCNC: 2 MG/DL (ref 1.7–2.3)
MCH RBC QN AUTO: 26.9 PG (ref 26.5–33)
MCHC RBC AUTO-ENTMCNC: 32.2 G/DL (ref 31.5–36.5)
MCV RBC AUTO: 84 FL (ref 78–100)
MONOCYTES # BLD AUTO: 0.6 10E3/UL (ref 0–1.3)
MONOCYTES NFR BLD AUTO: 8 %
MUCOUS THREADS #/AREA URNS LPF: PRESENT /LPF
NEUTROPHILS # BLD AUTO: 5 10E3/UL (ref 1.6–8.3)
NEUTROPHILS NFR BLD AUTO: 67 %
NITRATE UR QL: NEGATIVE
NRBC # BLD AUTO: 0 10E3/UL
NRBC BLD AUTO-RTO: 0 /100
PH UR STRIP: 8 [PH] (ref 5–7)
PLATELET # BLD AUTO: 218 10E3/UL (ref 150–450)
POTASSIUM SERPL-SCNC: 3.5 MMOL/L (ref 3.4–5.3)
PROT SERPL-MCNC: 7 G/DL (ref 6.4–8.3)
RBC # BLD AUTO: 4.5 10E6/UL (ref 3.8–5.2)
RBC URINE: 5 /HPF
SODIUM SERPL-SCNC: 138 MMOL/L (ref 135–145)
SP GR UR STRIP: 1.02 (ref 1–1.03)
SQUAMOUS EPITHELIAL: 1 /HPF
UROBILINOGEN UR STRIP-MCNC: <2 MG/DL
WBC # BLD AUTO: 7.5 10E3/UL (ref 4–11)
WBC URINE: 0 /HPF

## 2024-03-25 PROCEDURE — 81025 URINE PREGNANCY TEST: CPT | Performed by: EMERGENCY MEDICINE

## 2024-03-25 PROCEDURE — 85025 COMPLETE CBC W/AUTO DIFF WBC: CPT | Performed by: STUDENT IN AN ORGANIZED HEALTH CARE EDUCATION/TRAINING PROGRAM

## 2024-03-25 PROCEDURE — 96361 HYDRATE IV INFUSION ADD-ON: CPT

## 2024-03-25 PROCEDURE — 86140 C-REACTIVE PROTEIN: CPT | Performed by: EMERGENCY MEDICINE

## 2024-03-25 PROCEDURE — 36415 COLL VENOUS BLD VENIPUNCTURE: CPT | Performed by: STUDENT IN AN ORGANIZED HEALTH CARE EDUCATION/TRAINING PROGRAM

## 2024-03-25 PROCEDURE — 250N000011 HC RX IP 250 OP 636: Performed by: EMERGENCY MEDICINE

## 2024-03-25 PROCEDURE — 81001 URINALYSIS AUTO W/SCOPE: CPT | Performed by: EMERGENCY MEDICINE

## 2024-03-25 PROCEDURE — 255N000002 HC RX 255 OP 636

## 2024-03-25 PROCEDURE — 99285 EMERGENCY DEPT VISIT HI MDM: CPT | Mod: 25

## 2024-03-25 PROCEDURE — 99222 1ST HOSP IP/OBS MODERATE 55: CPT | Performed by: INTERNAL MEDICINE

## 2024-03-25 PROCEDURE — 96374 THER/PROPH/DIAG INJ IV PUSH: CPT | Mod: 59

## 2024-03-25 PROCEDURE — 74177 CT ABD & PELVIS W/CONTRAST: CPT

## 2024-03-25 PROCEDURE — 258N000001 HC RX 258: Performed by: INTERNAL MEDICINE

## 2024-03-25 PROCEDURE — G0378 HOSPITAL OBSERVATION PER HR: HCPCS

## 2024-03-25 PROCEDURE — 80053 COMPREHEN METABOLIC PANEL: CPT | Performed by: EMERGENCY MEDICINE

## 2024-03-25 PROCEDURE — 250N000011 HC RX IP 250 OP 636: Performed by: INTERNAL MEDICINE

## 2024-03-25 PROCEDURE — 96375 TX/PRO/DX INJ NEW DRUG ADDON: CPT

## 2024-03-25 PROCEDURE — C9113 INJ PANTOPRAZOLE SODIUM, VIA: HCPCS | Performed by: INTERNAL MEDICINE

## 2024-03-25 PROCEDURE — 83690 ASSAY OF LIPASE: CPT | Performed by: EMERGENCY MEDICINE

## 2024-03-25 PROCEDURE — 258N000003 HC RX IP 258 OP 636: Performed by: EMERGENCY MEDICINE

## 2024-03-25 PROCEDURE — 96376 TX/PRO/DX INJ SAME DRUG ADON: CPT

## 2024-03-25 PROCEDURE — 85025 COMPLETE CBC W/AUTO DIFF WBC: CPT | Performed by: EMERGENCY MEDICINE

## 2024-03-25 PROCEDURE — 74183 MRI ABD W/O CNTR FLWD CNTR: CPT

## 2024-03-25 PROCEDURE — 96375 TX/PRO/DX INJ NEW DRUG ADDON: CPT | Mod: 59

## 2024-03-25 PROCEDURE — 83735 ASSAY OF MAGNESIUM: CPT | Performed by: EMERGENCY MEDICINE

## 2024-03-25 PROCEDURE — A9585 GADOBUTROL INJECTION: HCPCS

## 2024-03-25 RX ORDER — NALOXONE HYDROCHLORIDE 0.4 MG/ML
0.2 INJECTION, SOLUTION INTRAMUSCULAR; INTRAVENOUS; SUBCUTANEOUS
Status: DISCONTINUED | OUTPATIENT
Start: 2024-03-25 | End: 2024-03-26 | Stop reason: HOSPADM

## 2024-03-25 RX ORDER — HYDROMORPHONE HCL IN WATER/PF 6 MG/30 ML
0.4 PATIENT CONTROLLED ANALGESIA SYRINGE INTRAVENOUS
Status: DISCONTINUED | OUTPATIENT
Start: 2024-03-25 | End: 2024-03-26 | Stop reason: HOSPADM

## 2024-03-25 RX ORDER — FLUTICASONE PROPIONATE 220 UG/1
1 AEROSOL, METERED RESPIRATORY (INHALATION) 2 TIMES DAILY
Status: DISCONTINUED | OUTPATIENT
Start: 2024-03-25 | End: 2024-03-26

## 2024-03-25 RX ORDER — HYDRALAZINE HYDROCHLORIDE 10 MG/1
10 TABLET, FILM COATED ORAL EVERY 4 HOURS PRN
Status: DISCONTINUED | OUTPATIENT
Start: 2024-03-25 | End: 2024-03-26 | Stop reason: HOSPADM

## 2024-03-25 RX ORDER — MORPHINE SULFATE 4 MG/ML
4 INJECTION, SOLUTION INTRAMUSCULAR; INTRAVENOUS ONCE
Status: COMPLETED | OUTPATIENT
Start: 2024-03-25 | End: 2024-03-25

## 2024-03-25 RX ORDER — HYDRALAZINE HYDROCHLORIDE 20 MG/ML
10 INJECTION INTRAMUSCULAR; INTRAVENOUS EVERY 4 HOURS PRN
Status: DISCONTINUED | OUTPATIENT
Start: 2024-03-25 | End: 2024-03-26 | Stop reason: HOSPADM

## 2024-03-25 RX ORDER — ACETAMINOPHEN 650 MG/1
650 SUPPOSITORY RECTAL EVERY 4 HOURS PRN
Status: DISCONTINUED | OUTPATIENT
Start: 2024-03-25 | End: 2024-03-26 | Stop reason: HOSPADM

## 2024-03-25 RX ORDER — ACETAMINOPHEN 325 MG/1
650 TABLET ORAL EVERY 4 HOURS PRN
Status: DISCONTINUED | OUTPATIENT
Start: 2024-03-25 | End: 2024-03-26 | Stop reason: HOSPADM

## 2024-03-25 RX ORDER — HYDROMORPHONE HCL IN WATER/PF 6 MG/30 ML
0.2 PATIENT CONTROLLED ANALGESIA SYRINGE INTRAVENOUS
Status: DISCONTINUED | OUTPATIENT
Start: 2024-03-25 | End: 2024-03-26 | Stop reason: HOSPADM

## 2024-03-25 RX ORDER — DEXTROSE MONOHYDRATE, SODIUM CHLORIDE, AND POTASSIUM CHLORIDE 50; 1.49; 9 G/1000ML; G/1000ML; G/1000ML
INJECTION, SOLUTION INTRAVENOUS CONTINUOUS
Status: DISPENSED | OUTPATIENT
Start: 2024-03-25 | End: 2024-03-26

## 2024-03-25 RX ORDER — NALOXONE HYDROCHLORIDE 0.4 MG/ML
0.4 INJECTION, SOLUTION INTRAMUSCULAR; INTRAVENOUS; SUBCUTANEOUS
Status: DISCONTINUED | OUTPATIENT
Start: 2024-03-25 | End: 2024-03-26 | Stop reason: HOSPADM

## 2024-03-25 RX ORDER — ONDANSETRON 2 MG/ML
4 INJECTION INTRAMUSCULAR; INTRAVENOUS ONCE
Status: COMPLETED | OUTPATIENT
Start: 2024-03-25 | End: 2024-03-25

## 2024-03-25 RX ORDER — ONDANSETRON 4 MG/1
4 TABLET, ORALLY DISINTEGRATING ORAL EVERY 6 HOURS PRN
Status: DISCONTINUED | OUTPATIENT
Start: 2024-03-25 | End: 2024-03-26 | Stop reason: HOSPADM

## 2024-03-25 RX ORDER — AMOXICILLIN 250 MG
2 CAPSULE ORAL 2 TIMES DAILY PRN
Status: DISCONTINUED | OUTPATIENT
Start: 2024-03-25 | End: 2024-03-26 | Stop reason: HOSPADM

## 2024-03-25 RX ORDER — AMOXICILLIN 250 MG
1 CAPSULE ORAL 2 TIMES DAILY PRN
Status: DISCONTINUED | OUTPATIENT
Start: 2024-03-25 | End: 2024-03-26 | Stop reason: HOSPADM

## 2024-03-25 RX ORDER — KETOROLAC TROMETHAMINE 15 MG/ML
15 INJECTION, SOLUTION INTRAMUSCULAR; INTRAVENOUS ONCE
Status: COMPLETED | OUTPATIENT
Start: 2024-03-25 | End: 2024-03-25

## 2024-03-25 RX ORDER — HYDROMORPHONE HYDROCHLORIDE 2 MG/1
2 TABLET ORAL EVERY 4 HOURS PRN
Status: DISCONTINUED | OUTPATIENT
Start: 2024-03-25 | End: 2024-03-26 | Stop reason: HOSPADM

## 2024-03-25 RX ORDER — ONDANSETRON 2 MG/ML
4 INJECTION INTRAMUSCULAR; INTRAVENOUS EVERY 6 HOURS PRN
Status: DISCONTINUED | OUTPATIENT
Start: 2024-03-25 | End: 2024-03-26 | Stop reason: HOSPADM

## 2024-03-25 RX ORDER — GADOBUTROL 604.72 MG/ML
10 INJECTION INTRAVENOUS ONCE
Status: COMPLETED | OUTPATIENT
Start: 2024-03-25 | End: 2024-03-25

## 2024-03-25 RX ORDER — ALBUTEROL SULFATE 90 UG/1
2 AEROSOL, METERED RESPIRATORY (INHALATION) EVERY 4 HOURS PRN
Status: DISCONTINUED | OUTPATIENT
Start: 2024-03-25 | End: 2024-03-26 | Stop reason: HOSPADM

## 2024-03-25 RX ORDER — IOPAMIDOL 755 MG/ML
90 INJECTION, SOLUTION INTRAVASCULAR ONCE
Status: COMPLETED | OUTPATIENT
Start: 2024-03-25 | End: 2024-03-25

## 2024-03-25 RX ADMIN — KETOROLAC TROMETHAMINE 15 MG: 15 INJECTION, SOLUTION INTRAMUSCULAR; INTRAVENOUS at 15:57

## 2024-03-25 RX ADMIN — SODIUM CHLORIDE 1000 ML: 9 INJECTION, SOLUTION INTRAVENOUS at 16:00

## 2024-03-25 RX ADMIN — MORPHINE SULFATE 4 MG: 4 INJECTION, SOLUTION INTRAMUSCULAR; INTRAVENOUS at 15:55

## 2024-03-25 RX ADMIN — MORPHINE SULFATE 4 MG: 4 INJECTION, SOLUTION INTRAMUSCULAR; INTRAVENOUS at 18:28

## 2024-03-25 RX ADMIN — ONDANSETRON 4 MG: 2 INJECTION INTRAMUSCULAR; INTRAVENOUS at 18:24

## 2024-03-25 RX ADMIN — IOPAMIDOL 90 ML: 755 INJECTION, SOLUTION INTRAVENOUS at 17:50

## 2024-03-25 RX ADMIN — GADOBUTROL 10 ML: 604.72 INJECTION INTRAVENOUS at 19:35

## 2024-03-25 RX ADMIN — PANTOPRAZOLE SODIUM 40 MG: 40 INJECTION, POWDER, FOR SOLUTION INTRAVENOUS at 22:31

## 2024-03-25 RX ADMIN — SODIUM CHLORIDE, POTASSIUM CHLORIDE, SODIUM LACTATE AND CALCIUM CHLORIDE 1000 ML: 600; 310; 30; 20 INJECTION, SOLUTION INTRAVENOUS at 18:34

## 2024-03-25 RX ADMIN — POTASSIUM CHLORIDE, DEXTROSE MONOHYDRATE AND SODIUM CHLORIDE: 150; 5; 900 INJECTION, SOLUTION INTRAVENOUS at 21:37

## 2024-03-25 ASSESSMENT — ACTIVITIES OF DAILY LIVING (ADL)
ADLS_ACUITY_SCORE: 31
ADLS_ACUITY_SCORE: 32
ADLS_ACUITY_SCORE: 35
ADLS_ACUITY_SCORE: 35
ADLS_ACUITY_SCORE: 31
ADLS_ACUITY_SCORE: 35

## 2024-03-25 ASSESSMENT — COLUMBIA-SUICIDE SEVERITY RATING SCALE - C-SSRS
2. HAVE YOU ACTUALLY HAD ANY THOUGHTS OF KILLING YOURSELF IN THE PAST MONTH?: NO
1. IN THE PAST MONTH, HAVE YOU WISHED YOU WERE DEAD OR WISHED YOU COULD GO TO SLEEP AND NOT WAKE UP?: NO
6. HAVE YOU EVER DONE ANYTHING, STARTED TO DO ANYTHING, OR PREPARED TO DO ANYTHING TO END YOUR LIFE?: NO

## 2024-03-25 NOTE — ED PROVIDER NOTES
"EMERGENCY DEPARTMENT ENCOUNTER      NAME: Dayan Madison  AGE: 48 year old female  YOB: 1975  MRN: [id number]  EVALUATION DATE & TIME: 3/25/2024  3:24 PM    PCP: Tera Titus    ED PROVIDER: Naresh Bonner M.D.      Chief Complaint   Patient presents with    Abdominal Pain         FINAL IMPRESSION:  Right upper quadrant abdominal pain      ED COURSE & MEDICAL DECISION MAKING:    Pertinent Labs & Imaging studies reviewed. (See chart for details)  48 year old female presents to the Emergency Department for evaluation of severe right upper quadrant abdominal pain.  Symptoms started few days ago.  Does improve slightly with ibuprofen/Tylenol but then returns.  No obvious change with eating.  Patient concerned it represents complication of prior \"sphincter of Oddi dysfunction\" per review of records indicate prior multiple MRCP/'s ERCPs for similar presentations.  Laboratory evaluation here without evidence of obvious biliary disease or evidence of pancreatitis on blood work obtained in triage.  Exam reveals a moderately obese female with significant distress and tenderness in the right upper quadrant.  She also has tenderness in right flank.  History does indicate prior kidney stone diagnosis.  Will treat symptomatically with intravenous fluids, Toradol, morphine.  CT imaging the abdomen being obtained.. Patient appears non toxic with stable vitals signs.  Patient arrives with  who provides additional information.      3:33 PM I met with the patient for the initial interview and physical examination. Discussed plan for treatment and workup in the ED.    5:53 PM.  Patient awaiting CT imaging.  Patient with history of prior cholecystectomy with common bile duct stenosis.  Patient also with history of renal colic.  Patient presenting today with right upper quadrant discomfort.  Laboratory evaluation reassuring other than markedly elevated C-reactive protein.  CT imaging pending.  Patient may require " hospitalization or referral given history and presentation.  Patient discussed with Dr. Becker at end of shift..  At the conclusion of the encounter I discussed the results of all of the tests and the disposition. The questions were answered and return precautions provided. The patient or family acknowledged understanding and was agreeable with the care plan.       PPE: Provider wore paper mask.     MEDICATIONS GIVEN IN THE EMERGENCY:  Medications   ketorolac (TORADOL) injection 15 mg (has no administration in time range)   morphine (PF) injection 4 mg (has no administration in time range)   sodium chloride 0.9% BOLUS 1,000 mL (has no administration in time range)       NEW PRESCRIPTIONS STARTED AT TODAY'S ER VISIT  New Prescriptions    No medications on file          =================================================================    HPI             Dayan Madison is a 48 year old female with a pertient medical history of sphincter of oddi dysfunction, diaphragmatic hernia, dilation of biliary tract, thyroid cancer, hypothyroidism who presents to the ED for evaluation of abdominal pain.     Patient reports upper and right upper quadrant pain that started on 3/21/24 that is waxing and waning in quality. She notes additional pain into her upper back. Patient has taken ibuprofen and tylenol with short term relief in symptoms. She states eating does not improve or worsen her symptoms.     Patient denies dysuria. History of sphincter of oddi dysfunction and has had a few stents placed.     Per chart review, patient received an MRI Abdomen w and w/o contrast mrcp on 9/24/23 for follow-up on biliary stricture common hepatic duct. Similar-appearing moderate stricture upper common hepatic duct near the bifurcation similar to previous. Nothing for masses. Mild intrahepatic bile duct dilatation. No new findings.      REVIEW OF SYSTEMS   Constitutional:  Denies fever, chills  Respiratory:  Denies productive cough or  increased work of breathing  Cardiovascular:  Denies chest pain, palpitations  GI:  Right upper quadrant and upper abdominal pain, Denies nausea, vomiting, or change in bowel or bladder habits   Musculoskeletal:  Upper back pain, Denies any new muscle/joint swelling  Skin:  Denies rash   Neurologic:  Denies focal weakness  All systems negative except as marked.     PAST MEDICAL HISTORY:  Past Medical History:   Diagnosis Date    Anxiety     Asthma     Depression     Disease of thyroid gland     Eczema     Gastroesophageal reflux disease     GERD (gastroesophageal reflux disease)     Obese     Sphincter of Oddi dysfunction     Thyroid cancer (H) 2000    Uncomplicated asthma        PAST SURGICAL HISTORY:  Past Surgical History:   Procedure Laterality Date    CHOLECYSTECTOMY      ENDOSCOPIC RETROGRADE CHOLANGIOPANCREATOGRAM N/A 08/16/2019    Procedure: ENDOSCOPIC RETROGRADE CHOLANGIOPANCREATOGRAPHY bilinary  spinctomy;  Surgeon: Richard Wade MD;  Location: [facility];  Service: Gastroenterology    ENDOSCOPIC RETROGRADE CHOLANGIOPANCREATOGRAM WITH SPYGLASS N/A 06/29/2021    Procedure: ENDOSCOPIC RETROGRADE CHOLANGIOPANCREATOGRAPHY, WITH DIRECT DUCT VISUALIZATION, USING PANCREATICOBILIARY FIBEROPTIC PROBE dilation, stent exchange;  Surgeon: Rodney Morales MD;  Location: [facility] OR    WI THYROIDECTOMY  01/01/2005    Thyroid Surgery Total Thyroidectomy;  Papillary thyroid cancer    XR ERCP BILIARY ONLY  08/16/2019         CURRENT MEDICATIONS:      Current Facility-Administered Medications:     ketorolac (TORADOL) injection 15 mg, 15 mg, Intravenous, Once, Naresh Bonner MD    morphine (PF) injection 4 mg, 4 mg, Intravenous, Once, Naresh Bonner MD    sodium chloride 0.9% BOLUS 1,000 mL, 1,000 mL, Intravenous, Once, Naresh Bonner MD    Current Outpatient Medications:     albuterol (PROAIR HFA/PROVENTIL HFA/VENTOLIN HFA) 108 (90 Base) MCG/ACT inhaler, Inhale 2 puffs into the lungs every 4 hours as needed  for shortness of breath or wheezing, Disp: 18 g, Rfl: 1    FLUoxetine (PROZAC) 20 MG capsule, Take 1 capsule (20 mg) by mouth daily, Disp: 90 capsule, Rfl: 3    fluticasone (FLOVENT HFA) 220 MCG/ACT inhaler, Inhale 1 puff into the lungs 2 times daily, Disp: 12 g, Rfl: 1    levothyroxine (SYNTHROID/LEVOTHROID) 175 MCG tablet, Take 1 tablet (175 mcg) by mouth daily, Disp: 90 tablet, Rfl: 3    nystatin (MYCOSTATIN) 096420 UNIT/GM external powder, Apply topically 3 times daily as needed, Disp: 60 g, Rfl: 0    triamcinolone (ARISTOCORT HP) 0.5 % external cream, [TRIAMCINOLONE (KENALOG) 0.5 % CREAM] A thin layer to affected area(s), Disp: 30 g, Rfl: 2    ALLERGIES:  No Known Allergies    FAMILY HISTORY:  Family History   Problem Relation Age of Onset    Heart Disease Maternal Grandmother          in her 80's    Breast Cancer Maternal Aunt     Breast Cancer Maternal Aunt     Factor V Leiden deficiency Mother        SOCIAL HISTORY:   Social History     Socioeconomic History    Marital status:    Tobacco Use    Smoking status: Never    Smokeless tobacco: Never   Vaping Use    Vaping Use: Never used   Substance and Sexual Activity    Alcohol use: Yes    Drug use: Never     Social Determinants of Health     Financial Resource Strain: Low Risk  (2023)    Financial Resource Strain     Within the past 12 months, have you or your family members you live with been unable to get utilities (heat, electricity) when it was really needed?: No   Food Insecurity: Low Risk  (2023)    Food Insecurity     Within the past 12 months, did you worry that your food would run out before you got money to buy more?: No     Within the past 12 months, did the food you bought just not last and you didn t have money to get more?: No   Transportation Needs: Low Risk  (2023)    Transportation Needs     Within the past 12 months, has lack of transportation kept you from medical appointments, getting your medicines, non-medical  "meetings or appointments, work, or from getting things that you need?: No   Interpersonal Safety: Low Risk  (11/10/2023)    Interpersonal Safety     Do you feel physically and emotionally safe where you currently live?: Yes     Within the past 12 months, have you been hit, slapped, kicked or otherwise physically hurt by someone?: No     Within the past 12 months, have you been humiliated or emotionally abused in other ways by your partner or ex-partner?: No   Housing Stability: Low Risk  (11/7/2023)    Housing Stability     Do you have housing? : Yes     Are you worried about losing your housing?: No       VITALS:  Patient Vitals for the past 24 hrs:   BP Temp Temp src Pulse Resp SpO2 Height Weight   03/25/24 1433 (!) 177/108 98.8  F (37.1  C) Oral 85 22 98 % 1.626 m (5' 4\") 104.3 kg (230 lb)        PHYSICAL EXAM    Constitutional:  Moderate distress, Awake, alert  HENT:  Normocephalic, Atraumatic. Bilateral external ears normal. Oropharynx moist. Nose normal. Neck- Normal range of motion with no guarding, No midline cervical tenderness, Supple, No stridor.   Eyes:  PERRL, EOMI with no signs of entrapment, Conjunctiva normal, No discharge.   Respiratory:  Normal breath sounds, No respiratory distress, No wheezing.    Cardiovascular:  Normal heart rate, Normal rhythm, No appreciable rubs or gallops.   GI:  Mild right upper quadrant tenderness and right CVA tenderness, Soft,  No distension, No palpable masses  Musculoskeletal:  Intact distal pulses, No edema. Good range of motion in all major joints. No tenderness to palpation or major deformities noted.  Integument:  Warm, Dry, No erythema, No rash.   Neurologic:  Alert & oriented, Normal motor function, Normal sensory function, No focal deficits noted.   Psychiatric:  Affect normal, Judgment normal, Mood normal.     LAB:  All pertinent labs reviewed and interpreted.  Results for orders placed or performed during the hospital encounter of 03/25/24   Basic metabolic " panel   Result Value Ref Range    Sodium 138 135 - 145 mmol/L    Potassium 3.5 3.4 - 5.3 mmol/L    Chloride 102 98 - 107 mmol/L    Carbon Dioxide (CO2) 25 22 - 29 mmol/L    Anion Gap 11 7 - 15 mmol/L    Urea Nitrogen 9.4 6.0 - 20.0 mg/dL    Creatinine 0.65 0.51 - 0.95 mg/dL    GFR Estimate >90 >60 mL/min/1.73m2    Calcium 9.4 8.6 - 10.0 mg/dL    Glucose 100 (H) 70 - 99 mg/dL   Hepatic function panel   Result Value Ref Range    Protein Total 7.0 6.4 - 8.3 g/dL    Albumin 3.3 (L) 3.5 - 5.2 g/dL    Bilirubin Total 0.9 <=1.2 mg/dL    Alkaline Phosphatase 143 40 - 150 U/L    AST 31 0 - 45 U/L    ALT 43 0 - 50 U/L    Bilirubin Direct <0.20 0.00 - 0.30 mg/dL   Result Value Ref Range    Lipase 36 13 - 60 U/L   Result Value Ref Range    CRP Inflammation 154.60 (H) <5.00 mg/L   Result Value Ref Range    Magnesium 2.0 1.7 - 2.3 mg/dL   CBC with platelets and differential   Result Value Ref Range    WBC Count 7.5 4.0 - 11.0 10e3/uL    RBC Count 4.50 3.80 - 5.20 10e6/uL    Hemoglobin 12.1 11.7 - 15.7 g/dL    Hematocrit 37.6 35.0 - 47.0 %    MCV 84 78 - 100 fL    MCH 26.9 26.5 - 33.0 pg    MCHC 32.2 31.5 - 36.5 g/dL    RDW 12.8 10.0 - 15.0 %    Platelet Count 218 150 - 450 10e3/uL    % Neutrophils 67 %    % Lymphocytes 25 %    % Monocytes 8 %    % Eosinophils 0 %    % Basophils 0 %    % Immature Granulocytes 0 %    NRBCs per 100 WBC 0 <1 /100    Absolute Neutrophils 5.0 1.6 - 8.3 10e3/uL    Absolute Lymphocytes 1.9 0.8 - 5.3 10e3/uL    Absolute Monocytes 0.6 0.0 - 1.3 10e3/uL    Absolute Eosinophils 0.0 0.0 - 0.7 10e3/uL    Absolute Basophils 0.0 0.0 - 0.2 10e3/uL    Absolute Immature Granulocytes 0.0 <=0.4 10e3/uL    Absolute NRBCs 0.0 10e3/uL       RADIOLOGY:  Reviewed all pertinent imaging. Please see official radiology report.  CT Abdomen Pelvis w Contrast    (Results Pending)       I, Augusto Posada, am serving as a scribe to document services personally performed by Naresh Bonner MD, based on my observation and the provider's  statements to me. I, Naresh Bonner MD attest that Augusto Posada is acting in a scribe capacity, has observed my performance of the services and has documented them in accordance with my direction.    Naresh Bonner M.D.  Emergency Medicine  [facility] EMERGENCY DEPARTMENT     Naresh Bonner MD  03/25/24 [id number]

## 2024-03-25 NOTE — ED TRIAGE NOTES
Patient presents here for evaluation of abdominal pain that has occurred over the past four days. She locates the pain to the upper midline abdomen with radiation to the back. She has a history of Sphincter of Vikas dysfunction. She has attempted to treat with ibuprofen and Tylenol. She has had reduction of her pain following the ibuprofen. Last dose of analgesic was at 2 am this morning.

## 2024-03-25 NOTE — ED NOTES
EMERGENCY DEPARTMENT SIGN OUT NOTE        ED COURSE AND MEDICAL DECISION MAKING  Patient was signed out to me by Dr. Bonner at 6:05 PM      In brief, Dayan Madison is a 48 year old female who initially presented for abdominal pain, n/v.     At time of sign out, disposition was pending CT AP then dispo afterward pending findings on CT and GI consultation as needed.    Chart r eviewed by myself:  6/29/2021 history of abdominal pain resolved with biliary sphincterotomy, history of recurrent abdominal pain prompting another outside ERCP demonstrating an instrument stricture of the bifurcation.  No mass seen on imaging.  Repeat ERCP with cholangioscopy performed by Dr. Rodney Morales.  Removal and it in situ pancreatic and biliary stents, patent biliary sphincterotomy, successful removal of small likely stent related stone debris, benign-appearing stenosis of bifurcation at the site of left main insertion, uncomplicated dilation of stenosis to 6 mm, subsequent placement of modified plastic biliary stent across the stenosis.    6:29 PM Spoke with Dr. Walden, GI from [facility].  Recommended MRCP.  This is ordered.  NPO at midnight. Clears until then.     7:15 PM Spoke to Dr. SEPULVEDA, Hospitalist, regarding plan for admission.    FINAL IMPRESSION    1. Right upper quadrant abdominal pain        ED MEDS  Medications   ketorolac (TORADOL) injection 15 mg (15 mg Intravenous $Given 3/25/24 1557)   morphine (PF) injection 4 mg (4 mg Intravenous $Given 3/25/24 1555)   sodium chloride 0.9% BOLUS 1,000 mL (0 mLs Intravenous Stopped 3/25/24 1814)   iopamidol (ISOVUE-370) solution 90 mL (90 mLs Intravenous $Given 3/25/24 1750)   morphine (PF) injection 4 mg (4 mg Intravenous $Given 3/25/24 1828)   lactated ringers BOLUS 1,000 mL (1,000 mLs Intravenous $New Bag 3/25/24 1834)   ondansetron (ZOFRAN) injection 4 mg (4 mg Intravenous $Given 3/25/24 1824)       LAB  Labs Ordered and Resulted from Time of ED Arrival to Time of ED Departure    ROUTINE UA WITH MICROSCOPIC REFLEX TO CULTURE - Abnormal       Result Value    Color Urine Yellow      Appearance Urine Clear      Glucose Urine Negative      Bilirubin Urine Negative      Ketones Urine Negative      Specific Gravity Urine 1.018      Blood Urine Negative      pH Urine 8.0 (*)     Protein Albumin Urine 70 (*)     Urobilinogen Urine <2.0      Nitrite Urine Negative      Leukocyte Esterase Urine Negative      Mucus Urine Present (*)     RBC Urine 5 (*)     WBC Urine 0      Squamous Epithelials Urine 1     BASIC METABOLIC PANEL - Abnormal    Sodium 138      Potassium 3.5      Chloride 102      Carbon Dioxide (CO2) 25      Anion Gap 11      Urea Nitrogen 9.4      Creatinine 0.65      GFR Estimate >90      Calcium 9.4      Glucose 100 (*)    HEPATIC FUNCTION PANEL - Abnormal    Protein Total 7.0      Albumin 3.3 (*)     Bilirubin Total 0.9      Alkaline Phosphatase 143      AST 31      ALT 43      Bilirubin Direct <0.20     CRP INFLAMMATION - Abnormal    CRP Inflammation 154.60 (*)    LIPASE - Normal    Lipase 36     MAGNESIUM - Normal    Magnesium 2.0     HCG QUALITATIVE URINE - Normal    hCG Urine Qualitative Negative     CBC WITH PLATELETS AND DIFFERENTIAL    WBC Count 7.5      RBC Count 4.50      Hemoglobin 12.1      Hematocrit 37.6      MCV 84      MCH 26.9      MCHC 32.2      RDW 12.8      Platelet Count 218      % Neutrophils 67      % Lymphocytes 25      % Monocytes 8      % Eosinophils 0      % Basophils 0      % Immature Granulocytes 0      NRBCs per 100 WBC 0      Absolute Neutrophils 5.0      Absolute Lymphocytes 1.9      Absolute Monocytes 0.6      Absolute Eosinophils 0.0      Absolute Basophils 0.0      Absolute Immature Granulocytes 0.0      Absolute NRBCs 0.0         EKG  N/A    RADIOLOGY    CT Abdomen Pelvis w Contrast   Final Result   IMPRESSION:    1.  Cholecystectomy with stable pneumobilia and mild biliary ductal dilatation. No CT evidence of choledocholithiasis or cholangitis.    2.  Small nonobstructing right renal calculi.   3.  Normal appendix.      MR Abdomen MRCP w/o & w Contrast    (Results Pending)       DISCHARGE MEDS  New Prescriptions    No medications on file       LA RAMSEY MD  [facility] EMERGENCY DEPARTMENT  [address] [place] [id number] [phone]       La Ramsey MD  03/25/24 3634

## 2024-03-26 VITALS
SYSTOLIC BLOOD PRESSURE: 139 MMHG | OXYGEN SATURATION: 98 % | HEIGHT: 64 IN | DIASTOLIC BLOOD PRESSURE: 77 MMHG | BODY MASS INDEX: 39.27 KG/M2 | HEART RATE: 59 BPM | RESPIRATION RATE: 20 BRPM | TEMPERATURE: 97.6 F | WEIGHT: 230 LBS

## 2024-03-26 LAB
ALBUMIN SERPL BCG-MCNC: 3.1 G/DL (ref 3.5–5.2)
ALP SERPL-CCNC: 124 U/L (ref 40–150)
ALT SERPL W P-5'-P-CCNC: 32 U/L (ref 0–50)
ANION GAP SERPL CALCULATED.3IONS-SCNC: 8 MMOL/L (ref 7–15)
AST SERPL W P-5'-P-CCNC: 20 U/L (ref 0–45)
BILIRUB SERPL-MCNC: 0.7 MG/DL
BUN SERPL-MCNC: 11.5 MG/DL (ref 6–20)
CALCIUM SERPL-MCNC: 8.6 MG/DL (ref 8.6–10)
CHLORIDE SERPL-SCNC: 106 MMOL/L (ref 98–107)
CREAT SERPL-MCNC: 0.56 MG/DL (ref 0.51–0.95)
DEPRECATED HCO3 PLAS-SCNC: 24 MMOL/L (ref 22–29)
EGFRCR SERPLBLD CKD-EPI 2021: >90 ML/MIN/1.73M2
ERYTHROCYTE [DISTWIDTH] IN BLOOD BY AUTOMATED COUNT: 13 % (ref 10–15)
GLUCOSE SERPL-MCNC: 130 MG/DL (ref 70–99)
HCT VFR BLD AUTO: 39.3 % (ref 35–47)
HGB BLD-MCNC: 12.3 G/DL (ref 11.7–15.7)
MCH RBC QN AUTO: 27.3 PG (ref 26.5–33)
MCHC RBC AUTO-ENTMCNC: 31.3 G/DL (ref 31.5–36.5)
MCV RBC AUTO: 87 FL (ref 78–100)
PLATELET # BLD AUTO: 202 10E3/UL (ref 150–450)
POTASSIUM SERPL-SCNC: 3.9 MMOL/L (ref 3.4–5.3)
PROT SERPL-MCNC: 6.4 G/DL (ref 6.4–8.3)
RBC # BLD AUTO: 4.5 10E6/UL (ref 3.8–5.2)
SODIUM SERPL-SCNC: 138 MMOL/L (ref 135–145)
WBC # BLD AUTO: 8.3 10E3/UL (ref 4–11)

## 2024-03-26 PROCEDURE — 250N000013 HC RX MED GY IP 250 OP 250 PS 637: Performed by: INTERNAL MEDICINE

## 2024-03-26 PROCEDURE — 85041 AUTOMATED RBC COUNT: CPT | Performed by: INTERNAL MEDICINE

## 2024-03-26 PROCEDURE — 99207 PR APP CREDIT; MD BILLING SHARED VISIT: CPT | Performed by: FAMILY MEDICINE

## 2024-03-26 PROCEDURE — 96375 TX/PRO/DX INJ NEW DRUG ADDON: CPT

## 2024-03-26 PROCEDURE — 80053 COMPREHEN METABOLIC PANEL: CPT | Performed by: INTERNAL MEDICINE

## 2024-03-26 PROCEDURE — G0378 HOSPITAL OBSERVATION PER HR: HCPCS

## 2024-03-26 PROCEDURE — 96376 TX/PRO/DX INJ SAME DRUG ADON: CPT

## 2024-03-26 PROCEDURE — 250N000011 HC RX IP 250 OP 636

## 2024-03-26 PROCEDURE — 99239 HOSP IP/OBS DSCHRG MGMT >30: CPT | Mod: FS

## 2024-03-26 PROCEDURE — 36415 COLL VENOUS BLD VENIPUNCTURE: CPT | Performed by: INTERNAL MEDICINE

## 2024-03-26 PROCEDURE — 250N000011 HC RX IP 250 OP 636: Performed by: INTERNAL MEDICINE

## 2024-03-26 RX ORDER — PROCHLORPERAZINE 25 MG
25 SUPPOSITORY, RECTAL RECTAL EVERY 12 HOURS PRN
Status: DISCONTINUED | OUTPATIENT
Start: 2024-03-26 | End: 2024-03-26 | Stop reason: HOSPADM

## 2024-03-26 RX ORDER — PROCHLORPERAZINE MALEATE 5 MG
5 TABLET ORAL EVERY 6 HOURS PRN
Status: DISCONTINUED | OUTPATIENT
Start: 2024-03-26 | End: 2024-03-26 | Stop reason: HOSPADM

## 2024-03-26 RX ORDER — LIDOCAINE 40 MG/G
CREAM TOPICAL
Status: CANCELLED | OUTPATIENT
Start: 2024-03-26

## 2024-03-26 RX ADMIN — FLUOXETINE HYDROCHLORIDE 20 MG: 20 CAPSULE ORAL at 08:45

## 2024-03-26 RX ADMIN — ONDANSETRON 4 MG: 2 INJECTION INTRAMUSCULAR; INTRAVENOUS at 00:30

## 2024-03-26 RX ADMIN — LEVOTHYROXINE SODIUM 175 MCG: 0.03 TABLET ORAL at 08:45

## 2024-03-26 RX ADMIN — HYDROMORPHONE HYDROCHLORIDE 0.4 MG: 0.2 INJECTION, SOLUTION INTRAMUSCULAR; INTRAVENOUS; SUBCUTANEOUS at 00:50

## 2024-03-26 RX ADMIN — ACETAMINOPHEN 650 MG: 325 TABLET ORAL at 05:18

## 2024-03-26 RX ADMIN — PROCHLORPERAZINE EDISYLATE 5 MG: 5 INJECTION INTRAMUSCULAR; INTRAVENOUS at 11:10

## 2024-03-26 ASSESSMENT — ACTIVITIES OF DAILY LIVING (ADL)
ADLS_ACUITY_SCORE: 31
ADLS_ACUITY_SCORE: 32
ADLS_ACUITY_SCORE: 31
ADLS_ACUITY_SCORE: 32
ADLS_ACUITY_SCORE: 31
ADLS_ACUITY_SCORE: 32
ADLS_ACUITY_SCORE: 31
ADLS_ACUITY_SCORE: 32
ADLS_ACUITY_SCORE: 31

## 2024-03-26 NOTE — PLAN OF CARE
PRIMARY DIAGNOSIS: ACUTE PAIN  OUTPATIENT/OBSERVATION GOALS TO BE MET BEFORE DISCHARGE:  1. Pain Status: Improved but still requiring IV narcotics.    2. Return to near baseline physical activity: Yes    3. Cleared for discharge by consultants (if involved): No    Discharge Planner Nurse   Safe discharge environment identified: Yes  Barriers to discharge: Yes       Entered by: Joycelyn Mcgregor RN 03/25/2024 10:14 PM     Please review provider order for any additional goals.   Nurse to notify provider when observation goals have been met and patient is ready for discharge.    Awaiting GI consult. Reports no pain upon arrival to unit after having IV pain medications in the ED. IV fluids infusing. Started IV protonix.

## 2024-03-26 NOTE — PLAN OF CARE
Goal Outcome Evaluation:    PRIMARY DIAGNOSIS: ACUTE PAIN  OUTPATIENT/OBSERVATION GOALS TO BE MET BEFORE DISCHARGE:  1. Pain Status: Improved-controlled with oral pain medications.    2. Return to near baseline physical activity: Yes    3. Cleared for discharge by consultants (if involved): No    Discharge Planner Nurse   Safe discharge environment identified: Yes  Barriers to discharge: Yes       Entered by: Aneta Sol RN 03/26/2024 11:36 AM     Please review provider order for any additional goals.   Nurse to notify provider when observation goals have been met and patient is ready for discharge.

## 2024-03-26 NOTE — PLAN OF CARE
PRIMARY DIAGNOSIS: ACUTE PAIN  OUTPATIENT/OBSERVATION GOALS TO BE MET BEFORE DISCHARGE:  1. Pain Status: Improved but still requiring IV narcotics.    2. Return to near baseline physical activity: No    3. Cleared for discharge by consultants (if involved): No    Discharge Planner Nurse   Safe discharge environment identified: Yes  Barriers to discharge: Yes       Entered by: Michell Calderón RN 03/26/2024 2:28 AM     Please review provider order for any additional goals.   Nurse to notify provider when observation goals have been met and patient is ready for discharge.Goal Outcome Evaluation:

## 2024-03-26 NOTE — ED NOTES
"[facility] ED Handoff Report    ED Chief Complaint: The patient arrives from home with her  with c/o RUQ pain, Right flank pain with a hx of multiple MRCP/ERCP's, cholecystectomy with stenosis of common bile duct/bile duct stent placement.    ED Diagnosis:  (R10.11) Right upper quadrant abdominal pain  Comment: CT abdomen small non obstruction Right renal calculi.   Plan: zofran 4 mg for nausea, pain controlled with toradol 15 mg, morphine 4 mg, morphine 4 mg.        PMH:    Past Medical History:   Diagnosis Date    Anxiety     Asthma     Depression     Disease of thyroid gland     Eczema     Gastroesophageal reflux disease     GERD (gastroesophageal reflux disease)     Obese     Sphincter of Oddi dysfunction     Thyroid cancer (H) 2000    Uncomplicated asthma         Code Status:  No Order     Falls Risk: Yes Band: YES    Current Living Situation/Residence: lives with a significant other     Elimination Status: Continent: Ambulates to the bathroom with SBA.     Activity Level: SBA    Patients Preferred Language:  English     Needed: No    Vital Signs:  /81   Pulse 66   Temp 98.8  F (37.1  C) (Oral)   Resp 22   Ht 1.626 m (5' 4\")   Wt 104.3 kg (230 lb)   SpO2 95%   BMI 39.48 kg/m       Cardiac Rhythm: NA    Pain Score: 2/10 after morphine was administered    Is the Patient Confused:  No    Last Food or Drink: 03/25/24 prior to arrival to the ED.    Focused Assessment:  Patient alert and oriented x 4, pleasant and cooperative. C/O ruq pain and flank pain, room air, up with SBA.     Tests Performed: Done: Labs and Imaging, awaiting MRI results    Treatments Provided:  CT. MRI, medication    Family Dynamics/Concerns: No    Family Updated On Visitor Policy: Yes    Plan of Care Communicated to Family: Yes    Who Was Updated about Plan of Care: Patients     Belongings Checklist Done and Signed by Patient: Yes    Belongings Sent with Patient: yes    Medications sent with " patient: None    Covid: asymptomatic , na    Additional Information: Plan NPO at midnight; ERCP in the am. Clear liquids until midnight.     RN: Deepika Douglas RN 3/25/2024 8:06 PM

## 2024-03-26 NOTE — PHARMACY-ADMISSION MEDICATION HISTORY
Pharmacist Admission Medication History    Admission medication history is complete. The information provided in this note is only as accurate as the sources available at the time of the update.    Information Source(s): Patient via in-person    Pertinent Information: Patient is uncertain when she last took her medications. She believes that it was in the last couple of days. Patient has consistent fill history for levothyroxine, she lacks fill data for Flovent, fluoxetine, and omeprazole.     Changes made to PTA medication list:  Added: omeprazole  Deleted: None  Changed: None    Allergies reviewed with patient and updates made in EHR: yes    Medication History Completed By: Richard Turner [facility] 3/25/2024 8:44 PM    PTA Med List   Medication Sig Last Dose    albuterol (PROAIR HFA/PROVENTIL HFA/VENTOLIN HFA) 108 (90 Base) MCG/ACT inhaler Inhale 2 puffs into the lungs every 4 hours as needed for shortness of breath or wheezing  at PRN    FLUoxetine (PROZAC) 20 MG capsule Take 1 capsule (20 mg) by mouth daily 3/25/2024 at AM    fluticasone (FLOVENT HFA) 220 MCG/ACT inhaler Inhale 1 puff into the lungs 2 times daily 3/25/2024 at AM    levothyroxine (SYNTHROID/LEVOTHROID) 175 MCG tablet Take 1 tablet (175 mcg) by mouth daily 3/25/2024 at AM    nystatin (MYCOSTATIN) 838875 UNIT/GM external powder Apply topically 3 times daily as needed  at PRN    omeprazole (PRILOSEC) 20 MG DR capsule Take 40 mg by mouth daily 3/25/2024 at AM    triamcinolone (ARISTOCORT HP) 0.5 % external cream [TRIAMCINOLONE (KENALOG) 0.5 % CREAM] A thin layer to affected area(s)  at PRN

## 2024-03-26 NOTE — DISCHARGE SUMMARY
"[facility]  Hospitalist Discharge Summary      Date of Admission:  3/25/2024  Date of Discharge:  3/26/2024  Discharging Provider: Juanita Alvarez NP  Discharge Service: Hospitalist Service    Discharge Diagnoses   Right upper quadrant pain  Hepatic duct dilation and hepatic stricture    Clinically Significant Risk Factors     # Obesity: Estimated body mass index is 39.48 kg/m  as calculated from the following:    Height as of this encounter: 1.626 m (5' 4\").    Weight as of this encounter: 104.3 kg (230 lb).       Follow-ups Needed After Discharge   Follow-up Appointments     Follow-up and recommended labs and tests       Follow up with primary care provider, Tera Titus, within 7 days for   hospital follow- up.  No follow up labs or test are needed.  Follow up with GI in 1-2 weeks, Follow up with Dr. Morales          Discharge Disposition   Discharged to home  Condition at discharge: Stable    Hospital Course      Dayan Madison is a 48 year old female with past medical history of acquired hypothyroidism, asthma, obesity, cholecystectomy in 2000 with recurrent subsequent Sphincter of Oddi dysfunction ERCP x 3, last ERCP on 5/2021 who presented to ED for evaluation of acute onset severe right upper quadrant pain.  MRCP reported stricture involving proximal common hepatic duct with mild intrahepatic bile dilatation.  Patient tolerated without difficulty no nausea vomiting no increased abdominal pain.  Patient's diet advanced to regular patient was able to eat without difficulty.  Patient agrees with plan to discharge to home and follow-up outpatient with GI for ERCP.     #Acute onset right upper quadrant pain;  #History of sphincter of Oddi dysfunction s/p ERCP x 3, last on 5/2021  --Patient reported acute onset right upper quadrant pain since last 4 days  -- In ED, normal WBC count.  Normal LFT.  Normal lipase  -- CT abdomen and pelvis with no evidence of choledocholithiasis or " cholangitis  -- MRCP reported stricture involving proximal, hepatotoxins unchanged, with mild intrahepatic bile dilatation  -- Clear liquid Diet-patient tolerated without difficulty no nausea vomiting no increased abdominal pain.  Patient's diet advanced to regular patient was able to eat without difficulty.  -- Pain management with Tylenol and IV/p.o. narcotics as needed  -- GI consult:  The OR was not able to get ERCP on the schedule today. In light of normal WBCs and LFTs, will plan to advance the patient's diet. If the patient tolerates diet advances and does not have return of pain/fever, could discharge to home and have outpatient follow up with Dr Morales. If the patient has return of pain with diet advances or develops concerning symptoms of worsening pain or fever, would keep the patient in the hospital, npo at midnight,               -CRP elevated 154.6  -    Elevated blood pressure without diagnosis of hypertension; due to above issues  -- Better pain control.  As needed hydralazine.    History of asthma, not on exacerbation, as needed inhalers  History of anxiety and depression  PTA medication  History of hypothyroidism, PTA Synthyroid  History of GERD, PPI    Consultations This Hospital Stay   GASTROENTEROLOGY IP CONSULT    Code Status   Full Code    Time Spent on this Encounter   I, Juanita Alvarez NP, personally saw the patient today and spent greater than 30 minutes discharging this patient.       Juanita Alvarez NP  [facility] EXTENDED RECOVERY AND SHORT STAY  [address] [place] [id number]  Phone: [phone]  Fax: [phone]  ______________________________________________________________________    Physical Exam   Vital Signs: Temp: 97.6  F (36.4  C) Temp src: Oral BP: 139/77 Pulse: 59   Resp: 20 SpO2: 98 % O2 Device: None (Room air)    Weight: 230 lbs 0 oz  Constitutional: awake, alert, cooperative, no apparent distress, and appears stated age  Hematologic /  Lymphatic: no cervical lymphadenopathy and no supraclavicular lymphadenopathy  Respiratory: No increased work of breathing, good air exchange, clear to auscultation bilaterally, no crackles or wheezing  Cardiovascular: Normal apical impulse, regular rate and rhythm, normal S1 and S2, no S3 or S4, and no murmur noted  GI: No scars, normal bowel sounds, soft, non-distended, right upper quad mild tenderness, no masses palpated, no hepatosplenomegally  Skin: no bruising or bleeding, normal skin color, texture, turgor, no redness, warmth, or swelling, no rashes, and no lesions  Musculoskeletal: There is no redness, warmth, or swelling of the joints.  Full range of motion noted.  Motor strength is 5 out of 5 all extremities bilaterally.  Tone is normal.  Neurologic: Awake, alert, oriented to name, place and time.  Cranial nerves II-XII are grossly intact.  Motor is 5 out of 5 bilaterally.  Cerebellar finger to nose, heel to shin intact.  Sensory is intact.  Babinski down going, Romberg negative, and gait is normal.  Neuropsychiatric: General: normal, calm, and normal eye contact       Primary Care Physician   Tera Titus    Discharge Orders      Reason for your hospital stay    Right upper quadrant pain  Hepatic stricture and hepatic duct dilation.     Follow-up and recommended labs and tests     Follow up with primary care provider, Tera Titus, within 7 days for hospital follow- up.  No follow up labs or test are needed.  Follow up with GI in 1-2 weeks, Follow up with Dr. Morales     Activity    Your activity upon discharge: activity as tolerated     Diet    Follow this diet upon discharge: Orders Placed This Encounter      Regular Diet Adult       Significant Results and Procedures   Most Recent 3 CBC's:  Recent Labs   Lab Test 03/26/24  0735 03/25/24  1455 12/05/23  1019   WBC 8.3 7.5 8.1   HGB 12.3 12.1 13.0   MCV 87 84 83    218 232     Most Recent 3 BMP's:  Recent Labs   Lab Test 03/26/24  0735  03/25/24  1455 12/05/23  1019    138 137   POTASSIUM 3.9 3.5 3.8   CHLORIDE 106 102 100   CO2 24 25 26   BUN 11.5 9.4 11.8   CR 0.56 0.65 0.62   ANIONGAP 8 11 11   ELIJAH 8.6 9.4 9.7   * 100* 113*     Most Recent ESR & CRP:  Recent Labs   Lab Test 03/25/24  1455   CRPI 154.60*   ,   Results for orders placed or performed during the hospital encounter of 03/25/24   CT Abdomen Pelvis w Contrast    Narrative    EXAM: CT ABDOMEN PELVIS W CONTRAST  LOCATION: [facility]  DATE: 3/25/2024    INDICATION: Right upper quadrant tenderness, history of biliary stricture  COMPARISON: CT exams 12/05/2023, 11/07/2021 and MR abdomen 09/24/2021  TECHNIQUE: CT scan of the abdomen and pelvis was performed following injection of IV contrast. Multiplanar reformats were obtained. Dose reduction techniques were used.  CONTRAST: isovue 370 90ml    FINDINGS:   LOWER CHEST: Mild bibasilar scarring and air trapping.    HEPATOBILIARY: Cholecystectomy with stable pneumobilia and mild biliary ductal dilatation likely in part related to reservoir effect. No obstructing mass lesion or radiodense stone.    PANCREAS: Normal.    SPLEEN: Stable 8 mm low anterior cyst. Stable punctate hypodense lesions are likely cysts.    ADRENAL GLANDS: Stable benign-appearing 19 mm left adrenal nodule previously characterized as an adenoma.     KIDNEYS/BLADDER: Multiple left renal cysts. No follow-up is indicated. Nonobstructing 2 to 3 mm right renal calculi. No hydronephrosis or hydroureter. Collapsed bladder.    BOWEL: Normal including normal appendix. No free air or free fluid.    LYMPH NODES: Normal.    VASCULATURE: Normal.    PELVIC ORGANS: Stable heterogeneous mildly enlarged leiomyomatous uterus. No pelvic free fluid.    MUSCULOSKELETAL: Spinal and pelvic degenerative changes.      Impression    IMPRESSION:   1.  Cholecystectomy with stable pneumobilia and mild biliary ductal dilatation. No CT evidence of choledocholithiasis  or cholangitis.  2.  Small nonobstructing right renal calculi.  3.  Normal appendix.   MR Abdomen MRCP w/o & w Contrast    Narrative    EXAM: MR ABDOMEN MRCP W/O and W CONTRAST  LOCATION: [facility]  DATE: 3/25/2024    INDICATION: ruq ab pain  COMPARISON: CT abdomen and pelvis 03/25/2024 and MRCP 09/24/2021  TECHNIQUE: Routine MR liver/pancreas protocol including axial and coronal MRCP sequences. 2D and 3D reconstruction performed by MR technologist including MIP reconstruction and slab cholangiograms. If performed with contrast, additional dynamic T1 post   IV contrast images.  CONTRAST: 10ml gadavist     FINDINGS:     MRCP: Cholecystectomy with pneumobilia and mild dilatation of the intrahepatic bile ducts, unchanged. Stricture of the proximal common hepatic duct near the bifurcation is unchanged. No filling defects. The pancreatic duct is normal.    LIVER: Normal. No focal lesions.    PANCREAS: Normal.    ADDITIONAL FINDINGS: Small left renal cysts. 2 cm left adrenal nodule is unchanged.      Impression    IMPRESSION:  1.  Stricture involving the proximal common hepatic duct is unchanged, with mild intrahepatic bile dilatation.  2.  Cholecystectomy.  3.  Small left renal cysts.  4.  Stable left adrenal nodule.  5.  No acute findings.       Discharge Medications   Current Discharge Medication List        CONTINUE these medications which have NOT CHANGED    Details   albuterol (PROAIR HFA/PROVENTIL HFA/VENTOLIN HFA) 108 (90 Base) MCG/ACT inhaler Inhale 2 puffs into the lungs every 4 hours as needed for shortness of breath or wheezing  Qty: 18 g, Refills: 1    Comments: Pharmacy may dispense brand covered by insurance (Proair, or proventil or ventolin or generic albuterol inhaler)  Associated Diagnoses: Mild intermittent asthma with acute exacerbation      FLUoxetine (PROZAC) 20 MG capsule Take 1 capsule (20 mg) by mouth daily  Qty: 90 capsule, Refills: 3    Associated Diagnoses: Persistent  depressive disorder      fluticasone (FLOVENT HFA) 220 MCG/ACT inhaler Inhale 1 puff into the lungs 2 times daily  Qty: 12 g, Refills: 1    Comments: Pharmacy may dispense brand if preferred by insurance.  Associated Diagnoses: Mild intermittent asthma with acute exacerbation      levothyroxine (SYNTHROID/LEVOTHROID) 175 MCG tablet Take 1 tablet (175 mcg) by mouth daily  Qty: 90 tablet, Refills: 3    Associated Diagnoses: Postsurgical hypothyroidism      nystatin (MYCOSTATIN) 995723 UNIT/GM external powder Apply topically 3 times daily as needed  Qty: 60 g, Refills: 0    Associated Diagnoses: Candidiasis of skin      omeprazole (PRILOSEC) 20 MG DR capsule Take 40 mg by mouth daily      triamcinolone (ARISTOCORT HP) 0.5 % external cream [TRIAMCINOLONE (KENALOG) 0.5 % CREAM] A thin layer to affected area(s)  Qty: 30 g, Refills: 2    Associated Diagnoses: Eczema of hand           Allergies   No Known Allergies

## 2024-03-26 NOTE — CONSULTS
GI CONSULT NOTE      Name: Dayan Madison  Medical Record #: [id number]  YOB: 1975  Date of Admission: 3/25/2024  Date/Time: 3/26/2024/9:58 AM     CHIEF COMPLAINT: Upper abdominal pain     HISTORY OF PRESENT ILLNESS: We were asked to see Dayan Madison by Dr dozier for evaluation of abdominal pain. Dayan Madison is a 48 year old year old female with history of pression, anxiety, GERD, thyroid cancer with hypothyroidism, asthma, obesity, cholecystectomy in 2000 as well as sphincter of Oddi dysfunction with most recent ERCP June 29, 2021 who presented March 25 with acute right upper quadrant abdominal pain.  Laboratory evaluation in the ER revealed normal white blood cell count, LFTs, and lipase.  CRP was elevated to 154.6.  CT abdomen and pelvis showed prior cholecystectomy with stable pneumobilia and mild biliary ductal dilation but no definite evidence of choledocholithiasis or cholangitis.  MRCP shows a stricture involving the proximal common hepatic duct which is unchanged versus MRCP September 2021 with mild intrahepatic bile duct dilation.  She has been afebrile.  She reports having improvement of her upper abdominal pain since getting narcotics in the ER yesterday evening.    The patient has a history of sphincter of Oddi dysfunction and had been followed by  at the [facility].  She was last seen at the [facility] in 2021 and underwent an ERCP May 2021 with biliary sphincterotomy.  He then had an ERCP June 29, 2021 for removal of pancreatic and biliary stents.  He had a follow-up MRCP September 2021 but has not had any additional follow-up.    The patient describes having a few month history of intermittent upper abdominal pain.  This pain would typically be short lasting and resolve quickly.  4 days prior to admission, she developed more severe upper abdominal pain that felt much more intense.  She had associated nausea but no vomiting.  She reports having a fever of  102.7F 3 days prior to presenting to [facility].  She took Tylenol and ibuprofen and seemed to have some improvement.  Due to persistent symptoms, she presented to the ER for evaluation on .  She has had improvement of her discomfort since receiving narcotics in the ER.   She denies having significant change in her bowel habits.  She has been eating less but is trying to lose weight.  She attempted to run a 5K  reports running a mile and having to walk the rest.    She has never had a Colonoscopy but reports having a Cologuard through her gynecologist last year.    REVIEW OF SYSTEMS (ROS): Complete review of systems negative other than listed in HPI.    PAST MEDICAL HISTORY:  Past Medical History:   Diagnosis Date    Anxiety     Asthma     Depression     Disease of thyroid gland     Eczema     Gastroesophageal reflux disease     GERD (gastroesophageal reflux disease)     Obese     Sphincter of Oddi dysfunction     Thyroid cancer (H) 2000    Uncomplicated asthma         FAMILY HISTORY:  Family History   Problem Relation Age of Onset    Heart Disease Maternal Grandmother          in her 80's    Breast Cancer Maternal Aunt     Breast Cancer Maternal Aunt     Factor V Leiden deficiency Mother            MEDICATIONS PRIOR TO ADMISSION:   Medications Prior to Admission   Medication Sig Dispense Refill Last Dose    albuterol (PROAIR HFA/PROVENTIL HFA/VENTOLIN HFA) 108 (90 Base) MCG/ACT inhaler Inhale 2 puffs into the lungs every 4 hours as needed for shortness of breath or wheezing 18 g 1  at PRN    FLUoxetine (PROZAC) 20 MG capsule Take 1 capsule (20 mg) by mouth daily 90 capsule 3 3/25/2024 at AM    fluticasone (FLOVENT HFA) 220 MCG/ACT inhaler Inhale 1 puff into the lungs 2 times daily 12 g 1 3/25/2024 at AM    levothyroxine (SYNTHROID/LEVOTHROID) 175 MCG tablet Take 1 tablet (175 mcg) by mouth daily 90 tablet 3 3/25/2024 at AM    nystatin (MYCOSTATIN) 604588 UNIT/GM external powder Apply topically 3  "times daily as needed 60 g 0  at PRN    omeprazole (PRILOSEC) 20 MG DR capsule Take 40 mg by mouth daily   3/25/2024 at AM    triamcinolone (ARISTOCORT HP) 0.5 % external cream [TRIAMCINOLONE (KENALOG) 0.5 % CREAM] A thin layer to affected area(s) 30 g 2  at PRN          ALLERGIES: Patient has no known allergies.    PHYSICAL EXAM:    /75 (BP Location: Right arm)   Pulse 55   Temp 97.9  F (36.6  C) (Oral)   Resp 22   Ht 1.626 m (5' 4\")   Wt 104.3 kg (230 lb)   SpO2 97%   BMI 39.48 kg/m      GENERAL: Pleasant, no obvious distress, spines appropriately  EYES: No scleral icterus  LUNGS: Clear to auscultation bilaterally  HEART: S1S2, no lower extremity edema  ABDOMEN: Non-distended. Positive bowel sounds. Soft, mild upper abdominal tenderness, no reboynd or guarding  MUSKULOSKELETAL:  Warm and well perfused, moves all extremities well  SKIN: No jaundice  NEUROLOGIC: Alert and oriented  PSYCHIATRIC: Normal affect    LAB DATA:  CMP Results:   Recent Labs   Lab Test 03/26/24  0735 03/25/24  1455 12/05/23  1019    138 137   POTASSIUM 3.9 3.5 3.8   CHLORIDE 106 102 100   CO2 24 25 26   ANIONGAP 8 11 11   * 100* 113*   BUN 11.5 9.4 11.8   CR 0.56 0.65 0.62   BILITOTAL 0.7 0.9 0.6   ALKPHOS 124 143 107   ALT 32 43 33   AST 20 31 26      CBC  Recent Labs   Lab 03/26/24  0735 03/25/24  1455   WBC 8.3 7.5   RBC 4.50 4.50   HGB 12.3 12.1   HCT 39.3 37.6   MCV 87 84   MCH 27.3 26.9   MCHC 31.3* 32.2   RDW 13.0 12.8    218     INRNo lab results found in last 7 days.   Lipase   Date Value Ref Range Status   03/25/2024 36 13 - 60 U/L Final   12/05/2023 32 13 - 60 U/L Final   11/07/2021 22 0 - 52 U/L Final   06/29/2021 156 73 - 393 U/L Final   06/16/2019 70 (H) 0 - 52 U/L Final   06/05/2019 30 0 - 52 U/L Final       IMAGING:  EXAM: CT ABDOMEN PELVIS W CONTRAST  LOCATION: [facility] [place]  DATE: 3/25/2024     INDICATION: Right upper quadrant tenderness, history of biliary " stricture  COMPARISON: CT exams 12/05/2023, 11/07/2021 and MR abdomen 09/24/2021  TECHNIQUE: CT scan of the abdomen and pelvis was performed following injection of IV contrast. Multiplanar reformats were obtained. Dose reduction techniques were used.  CONTRAST: isovue 370 90ml     FINDINGS:   LOWER CHEST: Mild bibasilar scarring and air trapping.     HEPATOBILIARY: Cholecystectomy with stable pneumobilia and mild biliary ductal dilatation likely in part related to reservoir effect. No obstructing mass lesion or radiodense stone.     PANCREAS: Normal.     SPLEEN: Stable 8 mm low anterior cyst. Stable punctate hypodense lesions are likely cysts.     ADRENAL GLANDS: Stable benign-appearing 19 mm left adrenal nodule previously characterized as an adenoma.      KIDNEYS/BLADDER: Multiple left renal cysts. No follow-up is indicated. Nonobstructing 2 to 3 mm right renal calculi. No hydronephrosis or hydroureter. Collapsed bladder.     BOWEL: Normal including normal appendix. No free air or free fluid.     LYMPH NODES: Normal.     VASCULATURE: Normal.     PELVIC ORGANS: Stable heterogeneous mildly enlarged leiomyomatous uterus. No pelvic free fluid.     MUSCULOSKELETAL: Spinal and pelvic degenerative changes.                                                                      IMPRESSION:   1.  Cholecystectomy with stable pneumobilia and mild biliary ductal dilatation. No CT evidence of choledocholithiasis or cholangitis.  2.  Small nonobstructing right renal calculi.  3.  Normal appendix.    EXAM: MR ABDOMEN MRCP W/O and W CONTRAST  LOCATION: [facility]  DATE: 3/25/2024     INDICATION: ruq ab pain  COMPARISON: CT abdomen and pelvis 03/25/2024 and MRCP 09/24/2021  TECHNIQUE: Routine MR liver/pancreas protocol including axial and coronal MRCP sequences. 2D and 3D reconstruction performed by MR technologist including MIP reconstruction and slab cholangiograms. If performed with contrast, additional  "dynamic T1 post   IV contrast images.  CONTRAST: 10ml gadavist      FINDINGS:      MRCP: Cholecystectomy with pneumobilia and mild dilatation of the intrahepatic bile ducts, unchanged. Stricture of the proximal common hepatic duct near the bifurcation is unchanged. No filling defects. The pancreatic duct is normal.     LIVER: Normal. No focal lesions.     PANCREAS: Normal.     ADDITIONAL FINDINGS: Small left renal cysts. 2 cm left adrenal nodule is unchanged.                                                                      IMPRESSION:  1.  Stricture involving the proximal common hepatic duct is unchanged, with mild intrahepatic bile dilatation.  2.  Cholecystectomy.  3.  Small left renal cysts.  4.  Stable left adrenal nodule.  5.  No acute findings.    ERCP 6/9/2021 by Dr Morales  Findings:       A  film of the right upper quadrant demonstrated cholecystectomy        clips and a biliary stent. Limited white light imaging of the foregut        was notable only for a biliary stent appropriately internalized across a        modest, patent biliary sphincterotomy and a pancreatic stent across the        pancreatic orifice. The two stents were removed from the biliary tree        and the pancreatic duct using a snare. The bile duct was selectively        deeply cannulated with the 12 mm balloon in concert with an 0.025\"        Visiglide wire. Contrast was injected and I personally interpreted the        bile duct images. Ductal flow of contrast was adequate, image quality        was excellent and contrast extended throughout the intrahepatics. The        intrahepatics were mildly centrall dilated upstream of a moderate to        mild stenosis at the base of the bifurcation. The common duct was        unremarkable. There were no overt filling defects. The biliary tree was        swept with a 12 mm balloon starting at the left intrahepatic duct(s) and        right intrahepatic duct(s). Debris and sludge was swept " from the duct.        Next, the bile duct was explored endoscopically using the [employer]        direct visualization system. The SpyScope was advanced to the        bifurcation and visibility with the scope was excellent. The stenosis        was found and appeared as a benign thin, smooth cuff of tissue. The        stenosis was biopsied with a [employer] miniature biopsy forceps for        histology. Next the stenosis was dilated to 6mm over the wire. A        modified 7 Fr by 12 cm transpapillary stent with a single external        pigtail and no internal flaps was placed 12 cm across the stenosis. Bile        flowed through the stent and the stent was in good position. The ventral        pancreatic duct and orifice were selectively not interrogated.                                                                                    Impression:          - Uncomplicated removal of in situ pancreatic and                        biliary stents                        - Patent biliary sphincterotomy                        - Successful removal of small (likely stent related)                        stone debris                        - Benign appearing (on cholangioscopy) stenosis of the                        bifurcation near the site of a sharply angulated (normal                        variant) left main insertion, sampled during direct                        visualization                        - Uncomplicated dilation of the stenosis to 6mm and                        subsequent placement of a modified (to promote                        spontaneous ejection) plastic biliary stent across the                        stenosis   Recommendation:      - General anesthesia recovery with probable discharge                        home this afternoon                        - Follow up with your established providers as scheduled                        - Dr Morales to communicate the results of biopsies when                         available (we supsect these to return benign); this                        would prompt surveillance with a contrasted MRI/MRCP in                        3m (this may occur with established team or here)                        - All medications and diet may resume without delay                        - The findings and recommendations were discussed with                        the patient and their family                                                                                      electronically signed by JOSE RAMON Morales   _____________________   Rodney Morales MD     ASSESSMENT:  Upper abdominal pain-- This is a 48 year old year old female with history of pression, anxiety, GERD, thyroid cancer with hypothyroidism, asthma, obesity, cholecystectomy in 2000 as well as sphincter of Oddi dysfunction with most recent ERCP June 29, 2021 who presented March 25 with acute right upper quadrant abdominal pain. Laboratory evaluation included unremarkable CBC, Lipase, LFTs and MRCP shows similar findings to those seen 9/2021 with prior cholecystectomy and unchanged stricture involving the proximal hepatic duct with mild intrahepatic bile duct dilation.   Lack of fever, leukocytosis, and improvement of abdominal pain with normal liver tests is reassuring and lowers suspicion for choledocholithiasis, biliary obstruction, and cholangitis.   I reviewed the case with one of our hepatobiliary Mds. The exact cause for the patient's abdominal pain and fever 3/23/24 is not clear. Given her history, hepatobiliary etiology should be high on list of possibilities. Will plan to further evaluate for recurrent stenosis and/or sludge/stones within the duct via ERCP.     PLAN:  If fever, worsening abdominal pain, or rise in WBCs, obtain blood cultures.   Npo. Pain control. Antiemetics as needed.  Plan to proceed with ERCP later today.       Total time spent on this encounter =50minutes  Discussed with Dr. Briscoe, Dr Rosa, and primary  hospitalist.                                                 Freda Coppola PA-C  Thank you for the opportunity to participate in the care of this patient.   Please feel free to call me with any questions or concerns.  Phone number [phone].            ADDENDUM:  The OR was not able to get ERCP on the schedule today. In light of normal WBCs and LFTs, will plan to advance the patient's diet. If the patient tolerates diet advances and does not have return of pain/fever, could discharge to home and have outpatient follow up with Dr Morales. If the patient has return of pain with diet advances or develops concerning symptoms of worsening pain or fever, would keep the patient in the hospital, npo at midnight, and review case with hepatobiliary MD covering tomorrow (Dr Gibbs) re: possible ERCP.   I discussed with Dr Patel, the patient's primary hospitalist.   Freda Coppola PA-C

## 2024-03-26 NOTE — H&P
[facility]    History and Physical - Hospitalist Service       Date of Admission:  3/25/2024    Assessment & Plan      Dayan Madison is a 48 year old female with past medical history of acquired hypothyroidism, asthma, obesity, cholecystectomy in 2000 with recurrent subsequent Sphincter of Oddi dysfunction ERCP x 3, last ERCP on 5/2021 who presented to ED for evaluation of acute onset severe right upper quadrant pain.  MRCP reported stricture involving proximal common hepatic duct with mild intrahepatic bile dilatation.  Patient admitted for observation and GI consultation    #Acute onset right upper quadrant pain;  #History of sphincter of Oddi dysfunction s/p ERCP x 3, last on 5/2021  --Patient reported acute onset right upper quadrant pain since last 4 days  -- In ED, normal WBC count.  Normal LFT.  Normal lipase  -- CT abdomen and pelvis with no evidence of choledocholithiasis or cholangitis  -- MRCP reported stricture involving proximal, hepatotoxins unchanged, with mild intrahepatic bile dilatation  -- Clear liquid diet, n.p.o. after midnight  -- Pain management with Tylenol and IV/p.o. narcotics as needed  -- GI consulted  -- If patient spikes fever, get blood cultures and start IV Zosyn, discussed with patient's nurse    Elevated blood pressure without diagnosis of hypertension; due to above issues  -- Better pain control.  As needed hydralazine.    History of asthma, not on exacerbation, as needed inhalers  History of anxiety and depression  PTA medication  History of hypothyroidism, PTA Synthyroid  History of GERD, PPI       Observation Goals: -diagnostic tests and consults completed and resulted, -vital signs normal or at patient baseline, -tolerating oral intake to maintain hydration, -adequate pain control on oral analgesics, -returns to baseline functional status, -safe disposition plan has been identified, Nurse to notify provider when observation goals have been met and  "patient is ready for discharge.  Diet: NPO per Anesthesia Guidelines for Procedure/Surgery Except for: Meds  Clear Liquid Diet    DVT Prophylaxis: Pneumatic Compression Devices and Ambulate every shift  Frye Catheter: Not present  Lines: None     Cardiac Monitoring: None  Code Status: Full Code      Clinically Significant Risk Factors Present on Admission              # Hypoalbuminemia: Lowest albumin = 3.3 g/dL at 3/25/2024  2:55 PM, will monitor as appropriate          # Obesity: Estimated body mass index is 39.48 kg/m  as calculated from the following:    Height as of this encounter: 1.626 m (5' 4\").    Weight as of this encounter: 104.3 kg (230 lb).       # Asthma: noted on problem list        Disposition Plan      Expected Discharge Date: 03/26/2024                  Alhaji Pope MD  Hospitalist Service  [facility]  Securely message with [email or web address] (more info)  Text page via [employer] Paging/Directory     ______________________________________________________________________    Chief Complaint   Right upper quadrant pain.    History is obtained from the patient, from ED provider.  Reviewed previous medical record especially last hospitalization record from 5/2021      History of Present Illness   Dayan Madison is a 48 year old female with past medical history of acquired hypothyroidism, asthma, obesity, cholecystectomy in 2000 with recurrent subsequent Sphincter of Oddi dysfunction ERCP x 3, last ERCP on 5/2021 who presented to ED for evaluation of acute onset severe right upper quadrant pain.  MRCP reported stricture involving proximal common hepatic duct with mild intrahepatic bile dilatation.  Patient admitted for observation and GI consultation.    Patient reported sudden onset right upper quadrant pain last Thursday after having supper.  Patient reported pain has been constant since then and reported colicky in nature with variable intensity.  Patient also reported sometimes pain on right " infrascapular area.  Patient reported associated nausea but no vomiting.  Patient also reported 1 episode of fever on Friday and since then no fever.  Patient reported similar pain in the past and having multiple ERCP.  Patient denied smoking or drinking.  Patient denied chest pain, shortness of breath.  Patient received IV fluid boluses and multiple IV medication in ED and during my exam patient reported pain almost gone.  Patient's nurse at bedside during my visit.    Past Medical History    Past Medical History:   Diagnosis Date    Anxiety     Asthma     Depression     Disease of thyroid gland     Eczema     Gastroesophageal reflux disease     GERD (gastroesophageal reflux disease)     Obese     Sphincter of Oddi dysfunction     Thyroid cancer (H) 2000    Uncomplicated asthma        Past Surgical History   Past Surgical History:   Procedure Laterality Date    CHOLECYSTECTOMY      ENDOSCOPIC RETROGRADE CHOLANGIOPANCREATOGRAM N/A 08/16/2019    Procedure: ENDOSCOPIC RETROGRADE CHOLANGIOPANCREATOGRAPHY bilinary  spinctomy;  Surgeon: Richard Wade MD;  Location: [facility];  Service: Gastroenterology    ENDOSCOPIC RETROGRADE CHOLANGIOPANCREATOGRAM WITH SPYGLASS N/A 06/29/2021    Procedure: ENDOSCOPIC RETROGRADE CHOLANGIOPANCREATOGRAPHY, WITH DIRECT DUCT VISUALIZATION, USING PANCREATICOBILIARY FIBEROPTIC PROBE dilation, stent exchange;  Surgeon: Rodney Morales MD;  Location: [facility] OR    NE THYROIDECTOMY  01/01/2005    Thyroid Surgery Total Thyroidectomy;  Papillary thyroid cancer    XR ERCP BILIARY ONLY  08/16/2019       Prior to Admission Medications   Prior to Admission Medications   Prescriptions Last Dose Informant Patient Reported? Taking?   FLUoxetine (PROZAC) 20 MG capsule 3/25/2024 at AM  No Yes   Sig: Take 1 capsule (20 mg) by mouth daily   albuterol (PROAIR HFA/PROVENTIL HFA/VENTOLIN HFA) 108 (90 Base) MCG/ACT inhaler  at PRN  No Yes   Sig: Inhale 2 puffs into the lungs every 4 hours as needed  for shortness of breath or wheezing   fluticasone (FLOVENT HFA) 220 MCG/ACT inhaler 3/25/2024 at AM  No Yes   Sig: Inhale 1 puff into the lungs 2 times daily   levothyroxine (SYNTHROID/LEVOTHROID) 175 MCG tablet 3/25/2024 at AM  No Yes   Sig: Take 1 tablet (175 mcg) by mouth daily   nystatin (MYCOSTATIN) 117182 UNIT/GM external powder  at PRN  No Yes   Sig: Apply topically 3 times daily as needed   omeprazole (PRILOSEC) 20 MG DR capsule 3/25/2024 at AM  Yes Yes   Sig: Take 40 mg by mouth daily   triamcinolone (ARISTOCORT HP) 0.5 % external cream  at PRN  No Yes   Sig: [TRIAMCINOLONE (KENALOG) 0.5 % CREAM] A thin layer to affected area(s)      Facility-Administered Medications: None        Review of Systems    The 10 point Review of Systems is negative other than noted in the HPI or here.     Social History   I have reviewed this patient's social history and updated it with pertinent information if needed.  Social History     Tobacco Use    Smoking status: Never    Smokeless tobacco: Never   Vaping Use    Vaping Use: Never used   Substance Use Topics    Alcohol use: Yes    Drug use: Never         Family History   I have reviewed this patient's family history and updated it with pertinent information if needed.  Family History   Problem Relation Age of Onset    Heart Disease Maternal Grandmother          in her 80's    Breast Cancer Maternal Aunt     Breast Cancer Maternal Aunt     Factor V Leiden deficiency Mother         Physical Exam   Vital Signs: Temp: 98  F (36.7  C) Temp src: Oral BP: (!) 140/90 Pulse: 62   Resp: 19 SpO2: 96 % O2 Device: None (Room air)    Weight: 230 lbs 0 oz      General: Not in obvious distress.  HEENT: Normocephalic, supple neck  Chest: Clear to auscultation bilateral anteriorly, no wheezing  Heart: S1S2 normal, regular  Abdomen: Soft.  Obese, during my examination no tenderness appreciated. Bowel sounds- active.  Extremities: No legs swelling  Neuro: alert and awake, grossly  non-focal        Medical Decision Making             Data     I have personally reviewed the following data over the past 24 hrs:    7.5  \   12.1   / 218     138 102 9.4 /  100 (H)   3.5 25 0.65 \     ALT: 43 AST: 31 AP: 143 TBILI: 0.9   ALB: 3.3 (L) TOT PROTEIN: 7.0 LIPASE: 36     Procal: N/A CRP: 154.60 (H) Lactic Acid: N/A         Imaging results reviewed over the past 24 hrs:   Recent Results (from the past 24 hour(s))   CT Abdomen Pelvis w Contrast    Narrative    EXAM: CT ABDOMEN PELVIS W CONTRAST  LOCATION: [facility]  DATE: 3/25/2024    INDICATION: Right upper quadrant tenderness, history of biliary stricture  COMPARISON: CT exams 12/05/2023, 11/07/2021 and MR abdomen 09/24/2021  TECHNIQUE: CT scan of the abdomen and pelvis was performed following injection of IV contrast. Multiplanar reformats were obtained. Dose reduction techniques were used.  CONTRAST: isovue 370 90ml    FINDINGS:   LOWER CHEST: Mild bibasilar scarring and air trapping.    HEPATOBILIARY: Cholecystectomy with stable pneumobilia and mild biliary ductal dilatation likely in part related to reservoir effect. No obstructing mass lesion or radiodense stone.    PANCREAS: Normal.    SPLEEN: Stable 8 mm low anterior cyst. Stable punctate hypodense lesions are likely cysts.    ADRENAL GLANDS: Stable benign-appearing 19 mm left adrenal nodule previously characterized as an adenoma.     KIDNEYS/BLADDER: Multiple left renal cysts. No follow-up is indicated. Nonobstructing 2 to 3 mm right renal calculi. No hydronephrosis or hydroureter. Collapsed bladder.    BOWEL: Normal including normal appendix. No free air or free fluid.    LYMPH NODES: Normal.    VASCULATURE: Normal.    PELVIC ORGANS: Stable heterogeneous mildly enlarged leiomyomatous uterus. No pelvic free fluid.    MUSCULOSKELETAL: Spinal and pelvic degenerative changes.      Impression    IMPRESSION:   1.  Cholecystectomy with stable pneumobilia and mild biliary  ductal dilatation. No CT evidence of choledocholithiasis or cholangitis.  2.  Small nonobstructing right renal calculi.  3.  Normal appendix.   MR Abdomen MRCP w/o & w Contrast    Narrative    EXAM: MR ABDOMEN MRCP W/O and W CONTRAST  LOCATION: [facility]  DATE: 3/25/2024    INDICATION: ruq ab pain  COMPARISON: CT abdomen and pelvis 03/25/2024 and MRCP 09/24/2021  TECHNIQUE: Routine MR liver/pancreas protocol including axial and coronal MRCP sequences. 2D and 3D reconstruction performed by MR technologist including MIP reconstruction and slab cholangiograms. If performed with contrast, additional dynamic T1 post   IV contrast images.  CONTRAST: 10ml gadavist     FINDINGS:     MRCP: Cholecystectomy with pneumobilia and mild dilatation of the intrahepatic bile ducts, unchanged. Stricture of the proximal common hepatic duct near the bifurcation is unchanged. No filling defects. The pancreatic duct is normal.    LIVER: Normal. No focal lesions.    PANCREAS: Normal.    ADDITIONAL FINDINGS: Small left renal cysts. 2 cm left adrenal nodule is unchanged.      Impression    IMPRESSION:  1.  Stricture involving the proximal common hepatic duct is unchanged, with mild intrahepatic bile dilatation.  2.  Cholecystectomy.  3.  Small left renal cysts.  4.  Stable left adrenal nodule.  5.  No acute findings.

## 2024-03-26 NOTE — PLAN OF CARE
PRIMARY DIAGNOSIS: ACUTE PAIN  OUTPATIENT/OBSERVATION GOALS TO BE MET BEFORE DISCHARGE:  1. Pain Status: Improved but still requiring IV narcotics.    2. Return to near baseline physical activity: No    3. Cleared for discharge by consultants (if involved): No    Discharge Planner Nurse   Safe discharge environment identified: Yes  Barriers to discharge: Yes       Entered by: Michell Calderón RN 03/26/2024 6:46 AM     Please review provider order for any additional goals.   Nurse to notify provider when observation goals have been met and patient is ready for discharge.Goal Outcome Evaluation:

## 2024-03-26 NOTE — PROGRESS NOTES
Pt is alert and oriented x4, vitals stable, is in RA, is in NPO, complains of pain so IV dilaudid given, was vomiting so iv zofran given, complains of headache so tab tylenol given, is independent.

## 2024-04-15 ENCOUNTER — MYC MEDICAL ADVICE (OUTPATIENT)
Dept: FAMILY MEDICINE | Facility: CLINIC | Age: 49
End: 2024-04-15
Payer: COMMERCIAL

## 2024-04-15 ENCOUNTER — MYC REFILL (OUTPATIENT)
Dept: FAMILY MEDICINE | Facility: CLINIC | Age: 49
End: 2024-04-15
Payer: COMMERCIAL

## 2024-04-15 DIAGNOSIS — E89.0 POSTSURGICAL HYPOTHYROIDISM: Primary | ICD-10-CM

## 2024-04-15 DIAGNOSIS — K83.4 SPHINCTER OF ODDI DYSFUNCTION: Primary | ICD-10-CM

## 2024-04-15 NOTE — TELEPHONE ENCOUNTER
Pt was currently on Levothyroxine 200mcg. Then dose was increased to 175mcg 2/18/24.    Anaya Quintero MA on 4/15/2024 at 5:56 PM

## 2024-04-17 ENCOUNTER — TELEPHONE (OUTPATIENT)
Dept: FAMILY MEDICINE | Facility: CLINIC | Age: 49
End: 2024-04-17
Payer: COMMERCIAL

## 2024-04-17 NOTE — TELEPHONE ENCOUNTER
Prior Authorization Request  Who s requesting:  covermymeds  Pharmacy Name and Location: [facility]  Medication Name: omeprazole  Insurance Plan: [employer]  Insurance Member ID Number:  [id number]   CoverMyMeds Key: [id number]  Informed patient that prior authorizations can take up to 10 business days for response:   No  Okay to leave a detailed message:

## 2024-04-30 ENCOUNTER — LAB (OUTPATIENT)
Dept: LAB | Facility: CLINIC | Age: 49
End: 2024-04-30
Payer: COMMERCIAL

## 2024-04-30 DIAGNOSIS — E89.0 POSTSURGICAL HYPOTHYROIDISM: ICD-10-CM

## 2024-04-30 PROCEDURE — 36415 COLL VENOUS BLD VENIPUNCTURE: CPT

## 2024-04-30 PROCEDURE — 84443 ASSAY THYROID STIM HORMONE: CPT

## 2024-04-30 PROCEDURE — 84439 ASSAY OF FREE THYROXINE: CPT

## 2024-04-30 NOTE — TELEPHONE ENCOUNTER
Retail Pharmacy Prior Authorization Team   Phone: [phone]    PA Initiation    Medication: OMEPRAZOLE 20 MG PO CPDR  Insurance Company: [employer] ([employer]) - Phone [phone] Fax [phone]  Pharmacy Filling the Rx: [facility], [place] - [place] - [address]  Filling Pharmacy Phone: [phone]  Filling Pharmacy Fax:    Start Date: 4/30/2024

## 2024-05-01 LAB
T4 FREE SERPL-MCNC: 1.63 NG/DL (ref 0.9–1.7)
TSH SERPL DL<=0.005 MIU/L-ACNC: 0.22 UIU/ML (ref 0.3–4.2)

## 2024-05-01 NOTE — TELEPHONE ENCOUNTER
Prior Authorization Approval    Authorization Effective Date: 3/31/2024  Authorization Expiration Date: 4/30/2025  Medication: Omeprazole-APPROVED  Reference #:     Insurance Company: [employer] ([employer]) - Phone [phone] Fax [phone]  Which Pharmacy is filling the prescription (Not needed for infusion/clinic administered): [facility] [place] [address]  Pharmacy Notified: Yes  Patient Notified: Instructed pharmacy to notify patient when script is ready to /ship.

## 2024-05-14 ENCOUNTER — ANCILLARY PROCEDURE (OUTPATIENT)
Dept: GENERAL RADIOLOGY | Facility: CLINIC | Age: 49
End: 2024-05-14
Attending: FAMILY MEDICINE
Payer: COMMERCIAL

## 2024-05-14 ENCOUNTER — OFFICE VISIT (OUTPATIENT)
Dept: FAMILY MEDICINE | Facility: CLINIC | Age: 49
End: 2024-05-14
Payer: COMMERCIAL

## 2024-05-14 VITALS
OXYGEN SATURATION: 100 % | HEART RATE: 71 BPM | RESPIRATION RATE: 20 BRPM | TEMPERATURE: 97.8 F | SYSTOLIC BLOOD PRESSURE: 124 MMHG | DIASTOLIC BLOOD PRESSURE: 86 MMHG

## 2024-05-14 DIAGNOSIS — J06.9 URI, ACUTE: ICD-10-CM

## 2024-05-14 DIAGNOSIS — J45.41 MODERATE PERSISTENT ASTHMA WITH EXACERBATION: ICD-10-CM

## 2024-05-14 DIAGNOSIS — R05.1 ACUTE COUGH: ICD-10-CM

## 2024-05-14 DIAGNOSIS — R05.1 ACUTE COUGH: Primary | ICD-10-CM

## 2024-05-14 PROCEDURE — 71046 X-RAY EXAM CHEST 2 VIEWS: CPT | Mod: TC | Performed by: RADIOLOGY

## 2024-05-14 PROCEDURE — 99214 OFFICE O/P EST MOD 30 MIN: CPT | Performed by: FAMILY MEDICINE

## 2024-05-14 RX ORDER — PREDNISONE 20 MG/1
40 TABLET ORAL DAILY
Qty: 10 TABLET | Refills: 0 | Status: SHIPPED | OUTPATIENT
Start: 2024-05-14 | End: 2024-05-19

## 2024-05-15 RX ORDER — BENZONATATE 100 MG/1
100-200 CAPSULE ORAL 3 TIMES DAILY PRN
Qty: 40 CAPSULE | Refills: 0 | Status: SHIPPED | OUTPATIENT
Start: 2024-05-15

## 2024-05-15 NOTE — PATIENT INSTRUCTIONS
ibuprofen may help the pain of the cough.     Get started on the prednisone tonight.     Continue the albuterol    Tessalon may also may help.
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
## 2024-05-17 DIAGNOSIS — J45.21 MILD INTERMITTENT ASTHMA WITH ACUTE EXACERBATION: ICD-10-CM

## 2024-05-17 RX ORDER — FLUTICASONE PROPIONATE 220 UG/1
1 AEROSOL, METERED RESPIRATORY (INHALATION) 2 TIMES DAILY
Qty: 12 G | Refills: 1 | OUTPATIENT
Start: 2024-05-17

## 2024-05-17 RX ORDER — ALBUTEROL SULFATE 90 UG/1
2 AEROSOL, METERED RESPIRATORY (INHALATION) EVERY 4 HOURS PRN
Qty: 18 G | Refills: 1 | Status: SHIPPED | OUTPATIENT
Start: 2024-05-17 | End: 2024-06-11

## 2024-05-20 ENCOUNTER — TELEPHONE (OUTPATIENT)
Dept: FAMILY MEDICINE | Facility: CLINIC | Age: 49
End: 2024-05-20

## 2024-05-20 ENCOUNTER — MYC REFILL (OUTPATIENT)
Dept: FAMILY MEDICINE | Facility: CLINIC | Age: 49
End: 2024-05-20
Payer: COMMERCIAL

## 2024-05-20 DIAGNOSIS — J45.21 MILD INTERMITTENT ASTHMA WITH ACUTE EXACERBATION: ICD-10-CM

## 2024-05-20 RX ORDER — FLUTICASONE PROPIONATE 220 UG/1
1 AEROSOL, METERED RESPIRATORY (INHALATION) 2 TIMES DAILY
Qty: 12 G | Refills: 0 | Status: SHIPPED | OUTPATIENT
Start: 2024-05-20 | End: 2024-06-11

## 2024-05-20 NOTE — TELEPHONE ENCOUNTER
Prior Authorization Retail Medication Request    Medication/Dose: albuterol (PROAIR HFA/PROVENTIL HFA/VENTOLIN HFA) 108 (90 Base) MCG/ACT inhaler   Diagnosis and ICD code (if different than what is on RX):    Mild intermittent asthma with acute exacerbation [J45.21]     Insurance   Primary: [facility] OUT OF STATE   Insurance ID:      [id number]       Pharmacy Information (if different than what is on RX)  Name:  [facility]   Phone:  [phone]  Fax: [phone]

## 2024-05-31 NOTE — TELEPHONE ENCOUNTER
PRIOR AUTHORIZATION DENIED    Medication: FLUTICASONE PROPIONATE  MCG/ACT IN AERO  Insurance Company: [employer] Non-Specialty PA's - Phone [phone] Fax [phone]  Denial Date: 5/31/2024  Denial Reason(s): Needs to try/fail Qvar      Appeal Information:   Patient Notified: No

## 2024-05-31 NOTE — TELEPHONE ENCOUNTER
PA Initiation    Medication: FLUTICASONE PROPIONATE  MCG/ACT IN AERO  Insurance Company: [employer] Non-Specialty PA's - Phone [phone] Fax [phone]  [facility] the Rx[id number] [place], [place] - [address] [facility] [address]  Filling Pharmacy Phone: [phone]  Filling Pharmacy Fax:    Start Date: 5/31/2024

## 2024-06-03 ENCOUNTER — NURSE TRIAGE (OUTPATIENT)
Dept: FAMILY MEDICINE | Facility: CLINIC | Age: 49
End: 2024-06-03
Payer: COMMERCIAL

## 2024-06-03 NOTE — TELEPHONE ENCOUNTER
Nurse Triage SBAR    Is this a 2nd Level Triage? NO    Situation: Patient has had increased fatigue since March when thyroid dose was lowered.    Background: History of obesity, hypothyroidism, anemia, depression with anxiety, history of thyroid cancer.    Assessment: Patient reports increased fatigue.  Is very tired and can easily fall asleep sitting up during the day.  Sleeps more at night.  No other symptoms, no other new medications.      Protocol Recommended Disposition:   See in Office Within 2 Weeks    Recommendation: Scheduled with PCP for next week.  ROCIO Odonnell RN  [facility]           Does the patient meet one of the following criteria for ADS visit consideration? 16+ years old, with an MH PCP     TIP  Providers, please consider if this condition is appropriate for management at one of our Acute and Diagnostic Services sites.     If patient is a good candidate, please use dotphrase <dot>triageresponse and select Refer to ADS to document.    Reason for Disposition   Weakness is a chronic symptom (recurrent or ongoing AND lasting > 4 weeks)    Additional Information   Negative: SEVERE difficulty breathing (e.g., struggling for each breath, speaks in single words)   Negative: Shock suspected (e.g., cold/pale/clammy skin, too weak to stand, low BP, rapid pulse)   Negative: Difficult to awaken or acting confused (e.g., disoriented, slurred speech)   Negative: Fainted > 15 minutes ago and still feels too weak or dizzy to stand   Negative: SEVERE weakness (i.e., unable to walk or barely able to walk, requires support) and new-onset or worsening   Negative: Sounds like a life-threatening emergency to the triager   Negative: Weakness of the face, arm or leg on one side of the body   Negative: Has diabetes mellitus and weakness from low blood sugar (i.e., < 60 mg/dL or 3.5 mmol/L)   Negative: Recent heat exposure, suspected cause of weakness   Negative: Vomiting is main symptom    "Negative: Diarrhea is main symptom   Negative: Difficulty breathing   Negative: Heart beating < 50 beats per minute OR > 140 beats per minute   Negative: Extra heartbeats, irregular heart beating, or heart is beating very fast (i.e., 'palpitations')   Negative: Follows large amount of bleeding (e.g., from vomiting, rectum, vagina) (Exception: Small transient weakness from sight of a small amount blood.)   Negative: Black or tarry bowel movements   Negative: MODERATE weakness or fatigue from poor fluid intake with no improvement after 2 hours of rest and fluids   Negative: Drinking very little and dehydration suspected (e.g., no urine > 12 hours, very dry mouth, very lightheaded)   Negative: Patient sounds very sick or weak to the triager   Negative: MODERATE weakness (i.e., interferes with work, school, normal activities) and cause unknown (Exceptions: Weakness from acute minor illness or from poor fluid intake; weakness is chronic and not worse.)   Negative: Fever > 103 F  (39.4 C) and not able to get the Fever down using CARE ADVICE   Negative: Fever > 100.0 F (37.8 C) and bedridden (e.g., CVA, chronic illness, recovering from surgery)   Negative: Fever > 101 F (38.3 C) and over 60 years of age   Negative: Fever > 100.0 F (37.8 C) and diabetes mellitus or weak immune system (e.g., HIV positive, cancer chemo, splenectomy, organ transplant, chronic steroids)   Negative: Pale skin (pallor)   Negative: MODERATE weakness (i.e., interferes with work, school, normal activities) and persists > 3 days   Negative: Taking a medicine that could cause weakness (e.g., blood pressure medications, diuretics)   Negative: Patient wants to be seen   Negative: MILD weakness (i.e., does not interfere with ability to work, go to school, normal activities) and persists > 1 week   Negative: Fatigue (i.e., tires easily, decreased energy) and persists > 1 week    Answer Assessment - Initial Assessment Questions  1. DESCRIPTION: \"Describe " "how you are feeling.\"      Work out and feels good about that.  Used to sleep 7-8 hours and tired, but not falling asleep sitting up.  Now can sleep 9 hours and still tired and can fall asleep sitting up.  2. SEVERITY: \"How bad is it?\"  \"Can you stand and walk?\"    - MILD (0-3): Feels weak or tired, but does not interfere with work, school or normal activities.    - MODERATE (4-7): Able to stand and walk; weakness interferes with work, school, or normal activities.    - SEVERE (8-10): Unable to stand or walk; unable to do usual activities.      Cannot run as far as she used.   3. ONSET: \"When did these symptoms begin?\" (e.g., hours, days, weeks, months)      Noticed since lowered thyroid dose, which was in March.    4. CAUSE: \"What do you think is causing the weakness or fatigue?\" (e.g., not drinking enough fluids, medical problem, trouble sleeping)      Lowered dose in March.   5. NEW MEDICINES:  \"Have you started on any new medicines recently?\" (e.g., opioid pain medicines, benzodiazepines, muscle relaxants, antidepressants, antihistamines, neuroleptics, beta blockers)      No other new medications.   6. OTHER SYMPTOMS: \"Do you have any other symptoms?\" (e.g., chest pain, fever, cough, SOB, vomiting, diarrhea, bleeding, other areas of pain)      No, slight cough.  Just finished steroids and still tired.  7. PREGNANCY: \"Is there any chance you are pregnant?\" \"When was your last menstrual period?\"      no    Protocols used: Weakness (Generalized) and Fatigue-A-OH    "

## 2024-06-11 ENCOUNTER — OFFICE VISIT (OUTPATIENT)
Dept: FAMILY MEDICINE | Facility: CLINIC | Age: 49
End: 2024-06-11
Payer: COMMERCIAL

## 2024-06-11 VITALS
DIASTOLIC BLOOD PRESSURE: 80 MMHG | HEART RATE: 84 BPM | RESPIRATION RATE: 18 BRPM | HEIGHT: 63 IN | BODY MASS INDEX: 40.43 KG/M2 | OXYGEN SATURATION: 96 % | WEIGHT: 228.2 LBS | TEMPERATURE: 97.8 F | SYSTOLIC BLOOD PRESSURE: 130 MMHG

## 2024-06-11 DIAGNOSIS — J45.40 MODERATE PERSISTENT ASTHMA WITHOUT COMPLICATION: Primary | ICD-10-CM

## 2024-06-11 DIAGNOSIS — C73 MALIGNANT NEOPLASM OF THYROID GLAND (H): ICD-10-CM

## 2024-06-11 DIAGNOSIS — G47.10 DAYTIME HYPERSOMNIA: ICD-10-CM

## 2024-06-11 DIAGNOSIS — E66.01 MORBID OBESITY (H): ICD-10-CM

## 2024-06-11 DIAGNOSIS — J45.21 MILD INTERMITTENT ASTHMA WITH ACUTE EXACERBATION: ICD-10-CM

## 2024-06-11 DIAGNOSIS — E89.0 POSTSURGICAL HYPOTHYROIDISM: ICD-10-CM

## 2024-06-11 PROCEDURE — 99214 OFFICE O/P EST MOD 30 MIN: CPT | Performed by: NURSE PRACTITIONER

## 2024-06-11 RX ORDER — LEVOTHYROXINE SODIUM 200 UG/1
200 TABLET ORAL DAILY
Qty: 90 TABLET | Refills: 3 | Status: SHIPPED | OUTPATIENT
Start: 2024-06-11

## 2024-06-11 RX ORDER — ALBUTEROL SULFATE 90 UG/1
2 AEROSOL, METERED RESPIRATORY (INHALATION) EVERY 4 HOURS PRN
Qty: 18 G | Refills: 11 | Status: SHIPPED | OUTPATIENT
Start: 2024-06-11

## 2024-06-11 RX ORDER — MONTELUKAST SODIUM 10 MG/1
10 TABLET ORAL AT BEDTIME
Qty: 30 TABLET | Refills: 0 | Status: SHIPPED | OUTPATIENT
Start: 2024-06-11 | End: 2024-07-19

## 2024-06-11 ASSESSMENT — ASTHMA QUESTIONNAIRES
ACT_TOTALSCORE: 16
QUESTION_1 LAST FOUR WEEKS HOW MUCH OF THE TIME DID YOUR ASTHMA KEEP YOU FROM GETTING AS MUCH DONE AT WORK, SCHOOL OR AT HOME: SOME OF THE TIME
QUESTION_3 LAST FOUR WEEKS HOW OFTEN DID YOUR ASTHMA SYMPTOMS (WHEEZING, COUGHING, SHORTNESS OF BREATH, CHEST TIGHTNESS OR PAIN) WAKE YOU UP AT NIGHT OR EARLIER THAN USUAL IN THE MORNING: NOT AT ALL
ACT_TOTALSCORE: 16
QUESTION_5 LAST FOUR WEEKS HOW WOULD YOU RATE YOUR ASTHMA CONTROL: SOMEWHAT CONTROLLED
QUESTION_2 LAST FOUR WEEKS HOW OFTEN HAVE YOU HAD SHORTNESS OF BREATH: THREE TO SIX TIMES A WEEK
QUESTION_4 LAST FOUR WEEKS HOW OFTEN HAVE YOU USED YOUR RESCUE INHALER OR NEBULIZER MEDICATION (SUCH AS ALBUTEROL): ONE OR TWO TIMES PER DAY

## 2024-06-11 ASSESSMENT — PATIENT HEALTH QUESTIONNAIRE - PHQ9
SUM OF ALL RESPONSES TO PHQ QUESTIONS 1-9: 6
10. IF YOU CHECKED OFF ANY PROBLEMS, HOW DIFFICULT HAVE THESE PROBLEMS MADE IT FOR YOU TO DO YOUR WORK, TAKE CARE OF THINGS AT HOME, OR GET ALONG WITH OTHER PEOPLE: SOMEWHAT DIFFICULT
SUM OF ALL RESPONSES TO PHQ QUESTIONS 1-9: 6

## 2024-06-11 ASSESSMENT — PAIN SCALES - GENERAL: PAINLEVEL: NO PAIN (0)

## 2024-06-11 NOTE — PROGRESS NOTES
Assessment & Plan     ICD-10-CM    1. Moderate persistent asthma without complication  J45.40 beclomethasone HFA (QVAR REDIHALER) 80 MCG/ACT inhaler     montelukast (SINGULAIR) 10 MG tablet      2. Morbid obesity (H)  E66.01       3. Malignant neoplasm of thyroid gland (H)  C73       4. Postsurgical hypothyroidism  E89.0 levothyroxine (SYNTHROID/LEVOTHROID) 200 MCG tablet      5. Daytime hypersomnia  G47.10 Adult Sleep Eval & Management [facility] Referral      6. Mild intermittent asthma with acute exacerbation  J45.21 albuterol (PROAIR HFA/PROVENTIL HFA/VENTOLIN HFA) 108 (90 Base) MCG/ACT inhaler        Change Flovent/fluticasone to Qvar.  If failing to improve, let me know and we can try prior authorization for the fluticasone inhaler again.  Bring levothyroxine back up to 200 mcg.  Referral to sleep medicine for possible sleep apnea causing daytime hypersomnia.  Connect with GI. Try Singulair to see if it helps with symptoms since OTC allergy medicines cause sedation.  No signs or symptoms of recurrence of thyroid cancer.  We briefly discussed weight loss and we discussed avoiding GLP-1's because of thyroid cancer history.    Reviewed metabolic panel x 1, CBC x 1, hospitalist note x 1, MRCP x 1, CT abdomen x 1    Subjective     HPI     Patient presents today for med check.    Since we decreased her levothyroxine, she noticed increased fatigue issues.  Previously was on 200 mcg but was having some sleep difficulty so she wanted drop down to 175.  No change in fatigue issues.  Longstanding/chronic issue.  Continues to struggle with morbid obesity.  Weight stable since last check    Patient was recently hospitalized back in March.  Found to have stricture of the proximal common hepatic duct with mild intrahepatic bile dilation.  Confirmed on MRCP.  Discharge note makes mention of following up with GI for outpatient consultation/ERCP. patient has not gotten around to doing this yet.    Has been dealing with chronic  "cough.  No asthma history.  Has not been using her inhaler for the past month because insurance no longer covers fluticasone/Flovent.  Needs to switch to Qvar apparently.    Review of Systems - negative except for what's listed in the HPI      Objective    /80 (BP Location: Right arm, Patient Position: Sitting, Cuff Size: Adult Large)   Pulse 84   Temp 97.8  F (36.6  C) (Oral)   Resp 18   Ht 1.6 m (5' 3\")   Wt 103.5 kg (228 lb 3.2 oz)   LMP 11/01/2023 (Approximate)   SpO2 96%   BMI 40.42 kg/m    Physical Exam   General appearance - alert, well appearing, and in no distress  Mental status - alert, oriented to person, place, and time  Eyes -PERRLA  Ears - bilateral TM's and external ear canals normal  Mouth - mucous membranes moist. No oral lesions.  Lymphatics - no palpable lymphadenopathy  Chest -clear breath sounds.  Frequent tight coughing jags  Heart - normal rate and regular rhythm, S1 and S2 normal, no murmurs noted  Neurological - alert, oriented, normal speech, no focal findings or movement disorder noted, neck supple without rigidity, cranial nerves II through XII intact, motor and sensory grossly normal bilaterally, normal muscle tone, no tremors, strength 5/5  Extremities - peripheral pulses normal, no peripheral edema  Skin - normal coloration and turgor.    Tera Titus, CNP    This note has been dictated using voice recognition software. Any grammatical or context distortions are unintentional and inherent to the software.    "

## 2024-06-11 NOTE — PATIENT INSTRUCTIONS
Qvar sent to replace the flovent. If this doesn't work, let me know and we can try a new prior authorization.    Dr. Morales or [facility]INFA are options to follow up on that stricture    We can go back to 200 mcg for the levothyroxine.  I sent this to your pharmacy.    I did also place the referral to sleep medicine like we talked about to look into possible sleep apnea causing the daytime fatigue.  Scheduling number is highlighted in your paperwork.

## 2024-07-17 DIAGNOSIS — J45.40 MODERATE PERSISTENT ASTHMA WITHOUT COMPLICATION: ICD-10-CM

## 2024-07-19 RX ORDER — MONTELUKAST SODIUM 10 MG/1
1 TABLET ORAL
Qty: 90 TABLET | Refills: 3 | Status: SHIPPED | OUTPATIENT
Start: 2024-07-19

## 2024-08-05 ENCOUNTER — TRANSFERRED RECORDS (OUTPATIENT)
Dept: HEALTH INFORMATION MANAGEMENT | Facility: CLINIC | Age: 49
End: 2024-08-05
Payer: COMMERCIAL

## 2024-09-19 ENCOUNTER — VIRTUAL VISIT (OUTPATIENT)
Dept: SLEEP MEDICINE | Facility: CLINIC | Age: 49
End: 2024-09-19
Attending: NURSE PRACTITIONER
Payer: COMMERCIAL

## 2024-09-19 VITALS — HEIGHT: 64 IN | BODY MASS INDEX: 38.58 KG/M2 | WEIGHT: 226 LBS

## 2024-09-19 DIAGNOSIS — G47.10 DAYTIME HYPERSOMNIA: Primary | ICD-10-CM

## 2024-09-19 DIAGNOSIS — R06.83 SNORING: ICD-10-CM

## 2024-09-19 DIAGNOSIS — E66.01 MORBID OBESITY (H): ICD-10-CM

## 2024-09-19 DIAGNOSIS — F41.8 DEPRESSION WITH ANXIETY: ICD-10-CM

## 2024-09-19 PROCEDURE — 99204 OFFICE O/P NEW MOD 45 MIN: CPT | Mod: 95 | Performed by: INTERNAL MEDICINE

## 2024-09-19 ASSESSMENT — PAIN SCALES - GENERAL: PAINLEVEL: MILD PAIN (2)
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
## 2024-09-19 NOTE — NURSING NOTE
Current patient location: [address] [place] [id number]    Is the patient currently in the state of [place]? YES    Visit mode:VIDEO    If the visit is dropped, the patient can be reconnected by: VIDEO VISIT: Text to cell phone:   Telephone Information:   Mobile [phone]       Will anyone else be joining the visit? NO  (If patient encounters technical issues they should call [phone]    How would you like to obtain your AVS? MyChart    Are changes needed to the allergy or medication list? No    Are refills needed on medications prescribed by this physician? NO    Rooming Documentation:  Questionnaire(s) completed      Reason for visit: Consult    Guadalupe HINES

## 2024-09-19 NOTE — PATIENT INSTRUCTIONS
What is a Home Sleep Study?    your doctor can give you a portable sleep monitor to use at home, so you don t have to spend the night in the sleep lab. But you should use a portable monitor only if:   ?Your doctor thinks you have a condition that makes you stop breathing for short periods while you are asleep, called  sleep apnea.    ?You do not have other serious medical problems, such as heart disease or lung disease.    Please bring the home sleep study device back to the sleep center as soon as you are finished with it so we can score it.     The cost of care estimate line is [phone]. They are able to give the patient an estimate of the charges and also an estimate of their insurance coverage/patient responsibility.   After your sleep study is performed, please call us at [phone] or [phone] to schedule for a follow up to review the results of the sleep study.    Consider using one tab of low dose melatonin 3 mg or less on the night of the study.    It is completely voluntary.    Do not drive or operate machinery after intake of melatonin.     Due to the pandemic, we have many people waiting in line for sleep studies- this wait list is improving each week.   You should receive a call within 2 weeks from our staff to schedule you for  your test.   Depending on the delay in approval by your insurance carrier, the study will be completed within a few days to 2 weeks of that call.    Please make every effort you can to sleep on your back with one pillow behind your head.    Please also call your insurance company next week to see if your sleep study is approved and find out your co-pay.

## 2024-10-11 ENCOUNTER — PATIENT OUTREACH (OUTPATIENT)
Dept: CARE COORDINATION | Facility: CLINIC | Age: 49
End: 2024-10-11
Payer: COMMERCIAL

## 2024-10-15 ENCOUNTER — PATIENT OUTREACH (OUTPATIENT)
Dept: FAMILY MEDICINE | Facility: CLINIC | Age: 49
End: 2024-10-15
Payer: COMMERCIAL

## 2024-10-15 NOTE — TELEPHONE ENCOUNTER
Patient Quality Outreach    Patient is due for the following:   Asthma  -  ACT needed    Next Steps:   Patient was assigned appropriate questionnaire to complete    Type of outreach:    Sent [employer] message.      Questions for provider review:    None           Vy Gavin MA

## 2024-10-28 NOTE — PATIENT INSTRUCTIONS
Patient Education   Preventive Care Advice   This is general advice given by our system to help you stay healthy. However, your care team may have specific advice just for you. Please talk to your care team about your preventive care needs.  Nutrition  Eat 5 or more servings of fruits and vegetables each day.  Try wheat bread, brown rice and whole grain pasta (instead of white bread, rice, and pasta).  Get enough calcium and vitamin D. Check the label on foods and aim for 100% of the RDA (recommended daily allowance).  Lifestyle  Exercise at least 150 minutes each week  (30 minutes a day, 5 days a week).  Do muscle strengthening activities 2 days a week. These help control your weight and prevent disease.  No smoking.  Wear sunscreen to prevent skin cancer.  Have a dental exam and cleaning every 6 months.  Yearly exams  See your health care team every year to talk about:  Any changes in your health.  Any medicines your care team has prescribed.  Preventive care, family planning, and ways to prevent chronic diseases.  Shots (vaccines)   HPV shots (up to age 26), if you've never had them before.  Hepatitis B shots (up to age 59), if you've never had them before.  COVID-19 shot: Get this shot when it's due.  Flu shot: Get a flu shot every year.  Tetanus shot: Get a tetanus shot every 10 years.  Pneumococcal, hepatitis A, and RSV shots: Ask your care team if you need these based on your risk.  Shingles shot (for age 50 and up)  General health tests  Diabetes screening:  Starting at age 35, Get screened for diabetes at least every 3 years.  If you are younger than age 35, ask your care team if you should be screened for diabetes.  Cholesterol test: At age 39, start having a cholesterol test every 5 years, or more often if advised.  Bone density scan (DEXA): At age 50, ask your care team if you should have this scan for osteoporosis (brittle bones).  Hepatitis C: Get tested at least once in your life.  STIs (sexually  transmitted infections)  Before age 24: Ask your care team if you should be screened for STIs.  After age 24: Get screened for STIs if you're at risk. You are at risk for STIs (including HIV) if:  You are sexually active with more than one person.  You don't use condoms every time.  You or a partner was diagnosed with a sexually transmitted infection.  If you are at risk for HIV, ask about PrEP medicine to prevent HIV.  Get tested for HIV at least once in your life, whether you are at risk for HIV or not.  Cancer screening tests  Cervical cancer screening: If you have a cervix, begin getting regular cervical cancer screening tests starting at age 21.  Breast cancer scan (mammogram): If you've ever had breasts, begin having regular mammograms starting at age 40. This is a scan to check for breast cancer.  Colon cancer screening: It is important to start screening for colon cancer at age 45.  Have a colonoscopy test every 10 years (or more often if you're at risk) Or, ask your provider about stool tests like a FIT test every year or Cologuard test every 3 years.  To learn more about your testing options, visit:   .  For help making a decision, visit:   https://bit.ly/lq48973.  Prostate cancer screening test: If you have a prostate, ask your care team if a prostate cancer screening test (PSA) at age 55 is right for you.  Lung cancer screening: If you are a current or former smoker ages 50 to 80, ask your care team if ongoing lung cancer screenings are right for you.  For informational purposes only. Not to replace the advice of your health care provider. Copyright   2023 [employer]. All rights reserved. Clinically reviewed by the [facility]. [employer] [id number] - REV 01/24.

## 2024-10-29 ENCOUNTER — OFFICE VISIT (OUTPATIENT)
Dept: FAMILY MEDICINE | Facility: CLINIC | Age: 49
End: 2024-10-29
Payer: COMMERCIAL

## 2024-10-29 ENCOUNTER — MYC MEDICAL ADVICE (OUTPATIENT)
Dept: FAMILY MEDICINE | Facility: CLINIC | Age: 49
End: 2024-10-29

## 2024-10-29 VITALS
RESPIRATION RATE: 16 BRPM | WEIGHT: 227 LBS | HEART RATE: 80 BPM | OXYGEN SATURATION: 97 % | HEIGHT: 63 IN | BODY MASS INDEX: 40.22 KG/M2 | TEMPERATURE: 98.6 F | SYSTOLIC BLOOD PRESSURE: 126 MMHG | DIASTOLIC BLOOD PRESSURE: 86 MMHG

## 2024-10-29 DIAGNOSIS — J45.20 MILD INTERMITTENT ASTHMA WITHOUT COMPLICATION: ICD-10-CM

## 2024-10-29 DIAGNOSIS — Z13.1 SCREENING FOR DIABETES MELLITUS: ICD-10-CM

## 2024-10-29 DIAGNOSIS — Z00.00 ROUTINE GENERAL MEDICAL EXAMINATION AT A HEALTH CARE FACILITY: Primary | ICD-10-CM

## 2024-10-29 DIAGNOSIS — E89.0 POSTSURGICAL HYPOTHYROIDISM: ICD-10-CM

## 2024-10-29 DIAGNOSIS — Z85.850 HISTORY OF THYROID CANCER: ICD-10-CM

## 2024-10-29 DIAGNOSIS — F34.1 PERSISTENT DEPRESSIVE DISORDER: ICD-10-CM

## 2024-10-29 LAB
EST. AVERAGE GLUCOSE BLD GHB EST-MCNC: 108 MG/DL
HBA1C MFR BLD: 5.4 % (ref 0–5.6)

## 2024-10-29 PROCEDURE — 84439 ASSAY OF FREE THYROXINE: CPT

## 2024-10-29 PROCEDURE — 84443 ASSAY THYROID STIM HORMONE: CPT

## 2024-10-29 PROCEDURE — 36415 COLL VENOUS BLD VENIPUNCTURE: CPT

## 2024-10-29 PROCEDURE — 99214 OFFICE O/P EST MOD 30 MIN: CPT | Mod: 25

## 2024-10-29 PROCEDURE — 99396 PREV VISIT EST AGE 40-64: CPT

## 2024-10-29 PROCEDURE — 83036 HEMOGLOBIN GLYCOSYLATED A1C: CPT

## 2024-10-29 SDOH — HEALTH STABILITY: PHYSICAL HEALTH: ON AVERAGE, HOW MANY DAYS PER WEEK DO YOU ENGAGE IN MODERATE TO STRENUOUS EXERCISE (LIKE A BRISK WALK)?: 6 DAYS

## 2024-10-29 SDOH — HEALTH STABILITY: PHYSICAL HEALTH: ON AVERAGE, HOW MANY MINUTES DO YOU ENGAGE IN EXERCISE AT THIS LEVEL?: 50 MIN

## 2024-10-29 ASSESSMENT — ANXIETY QUESTIONNAIRES
2. NOT BEING ABLE TO STOP OR CONTROL WORRYING: NOT AT ALL
7. FEELING AFRAID AS IF SOMETHING AWFUL MIGHT HAPPEN: NOT AT ALL
IF YOU CHECKED OFF ANY PROBLEMS ON THIS QUESTIONNAIRE, HOW DIFFICULT HAVE THESE PROBLEMS MADE IT FOR YOU TO DO YOUR WORK, TAKE CARE OF THINGS AT HOME, OR GET ALONG WITH OTHER PEOPLE: SOMEWHAT DIFFICULT
6. BECOMING EASILY ANNOYED OR IRRITABLE: SEVERAL DAYS
5. BEING SO RESTLESS THAT IT IS HARD TO SIT STILL: NOT AT ALL
1. FEELING NERVOUS, ANXIOUS, OR ON EDGE: SEVERAL DAYS
3. WORRYING TOO MUCH ABOUT DIFFERENT THINGS: NOT AT ALL
GAD7 TOTAL SCORE: 2
7. FEELING AFRAID AS IF SOMETHING AWFUL MIGHT HAPPEN: NOT AT ALL
GAD7 TOTAL SCORE: 2
4. TROUBLE RELAXING: NOT AT ALL
8. IF YOU CHECKED OFF ANY PROBLEMS, HOW DIFFICULT HAVE THESE MADE IT FOR YOU TO DO YOUR WORK, TAKE CARE OF THINGS AT HOME, OR GET ALONG WITH OTHER PEOPLE?: SOMEWHAT DIFFICULT
GAD7 TOTAL SCORE: 2

## 2024-10-29 ASSESSMENT — PATIENT HEALTH QUESTIONNAIRE - PHQ9
10. IF YOU CHECKED OFF ANY PROBLEMS, HOW DIFFICULT HAVE THESE PROBLEMS MADE IT FOR YOU TO DO YOUR WORK, TAKE CARE OF THINGS AT HOME, OR GET ALONG WITH OTHER PEOPLE: SOMEWHAT DIFFICULT
SUM OF ALL RESPONSES TO PHQ QUESTIONS 1-9: 3
SUM OF ALL RESPONSES TO PHQ QUESTIONS 1-9: 3

## 2024-10-29 ASSESSMENT — PAIN SCALES - GENERAL: PAINLEVEL_OUTOF10: NO PAIN (0)

## 2024-10-29 NOTE — PROGRESS NOTES
"Preventive Care Visit  [facility]  Yumiko Martino PA-C, Family Medicine  Oct 29, 2024      Assessment & Plan     Routine general medical examination at a health care facility  Patient seen today for annual physical exam.  Routine health maintenance reviewed.   Vaccinations: Patient declined shingles and COVID  Breast cancer screen: UTD   Colon cancer screen: UTD, currently doing cologuard.  Cervical cancer screening: UTD, repeat in 2027.   Acute and chronic concerns addressed below.   - PRIMARY CARE FOLLOW-UP SCHEDULING  - REVIEW OF HEALTH MAINTENANCE PROTOCOL ORDERS    Persistent depressive disorder  PHQ-9 score today 3   ROSITA-7 score today 2  Stable  No SI/HI  Medications: Continue fluoxetine 20 mg once daily. No changes in medication management.  Recommended mindfulness, meditation, and exercise. Sleep hygiene.   Follow up: 1 year, sooner prn.  - FLUoxetine (PROZAC) 20 MG capsule  Dispense: 90 capsule; Refill: 3    History of thyroid cancer  Postsurgical hypothyroidism  Taking levothyroxine 200mcg daily. Denies any symptoms of hypothyroidism at this time. Will recheck her thyroid levels and adjust medication accordingly.  - TSH with free T4 reflex    Mild intermittent asthma without complication  Stable.   ACT 22 today.   Continue montelukast daily, QVAR inhaler daily and and albuterol inhaler prn.     Screening for diabetes mellitus  - Hemoglobin A1c      Patient has been advised of split billing requirements and indicates understanding: Yes    BMI  Estimated body mass index is 40.86 kg/m  as calculated from the following:    Height as of this encounter: 1.588 m (5' 2.5\").    Weight as of this encounter: 103 kg (227 lb).   Weight management plan: Discussed healthy diet and exercise guidelines    Counseling  Appropriate preventive services were addressed with this patient via screening, questionnaire, or discussion as appropriate for fall prevention, nutrition, physical activity, " Tobacco-use cessation, social engagement, weight loss and cognition.  Checklist reviewing preventive services available has been given to the patient.  Reviewed patient's diet, addressing concerns and/or questions.       Subjective   Dayan is a 49 year old, presenting for the following:  Physical (Non fasting)        10/29/2024     1:09 PM   Additional Questions   Roomed by Vy Gavin CMA         10/29/2024   Forms   Any forms needing to be completed Yes         HPI    History of thyroid cancer status post thyroidectomy. Taking levothyroxine 200 mcg. She denies any symptoms. Reports she has tried to lower the dosage before but it didn't go well.     Asthma: Takes montelukast daily. Takes QVAR daily. Albuterol inhaler prn. Hasn't needed to use her inhaler in quite some time. ACT 22.      Depression/anxiety: Fluoxetine 20 mg daily. Things are going well. Feels stable. A little life stress but feels she is managing ok.           10/29/2024   General Health   How would you rate your overall physical health? Good   Feel stress (tense, anxious, or unable to sleep) Not at all            10/29/2024   Nutrition   Three or more servings of calcium each day? Yes   Diet: Regular (no restrictions)   How many servings of fruit and vegetables per day? (!) 2-3   How many sweetened beverages each day? 0-1            10/29/2024   Exercise   Days per week of moderate/strenous exercise 6 days   Average minutes spent exercising at this level 50 min            10/29/2024   Social Factors   Worry food won't last until get money to buy more No   Food not last or not have enough money for food? No   Do you have housing? (Housing is defined as stable permanent housing and does not include staying ouside in a car, in a tent, in an abandoned building, in an overnight shelter, or couch-surfing.) Yes   Are you worried about losing your housing? No   Lack of transportation? No   Unable to get utilities (heat,electricity)? No             10/29/2024   Fall Risk   Fallen 2 or more times in the past year? No    Trouble with walking or balance? No        Patient-reported          10/29/2024   Dental   Dentist two times every year? Yes            10/29/2024   TB Screening   Were you born outside of the [place]? No          Today's PHQ-9 Score:       10/29/2024     1:10 PM   PHQ-9 SCORE   PHQ-9 Total Score MyChart 3 (Minimal depression)   PHQ-9 Total Score 3        Patient-reported         12/9/2020    10:00 AM 2/8/2021     2:00 PM 10/29/2024     1:11 PM   ROSITA-7 SCORE   Total Score   2 (minimal anxiety)   Total Score 7 14 2        Patient-reported               10/29/2024   Substance Use   Alcohol more than 3/day or more than 7/wk No   Do you use any other substances recreationally? No        Social History     Tobacco Use    Smoking status: Never     Passive exposure: Past    Smokeless tobacco: Never   Vaping Use    Vaping status: Never Used   Substance Use Topics    Alcohol use: Yes    Drug use: Never           2/7/2024   LAST FHS-7 RESULTS   1st degree relative breast or ovarian cancer No   Any relative bilateral breast cancer No   Any male have breast cancer No   Any ONE woman have BOTH breast AND ovarian cancer No   Any woman with breast cancer before 50yrs No   2 or more relatives with breast AND/OR ovarian cancer Yes   2 or more relatives with breast AND/OR bowel cancer Yes           Mammogram Screening - Mammogram every 1-2 years updated in Health Maintenance based on mutual decision making        10/29/2024   STI Screening   New sexual partner(s) since last STI/HIV test? No      History of abnormal Pap smear: No - age 30-64 HPV with reflex Pap every 5 years recommended     ASCVD Risk   The 10-year ASCVD risk score (Karrie OLIVA, et al., 2019) is: 0.8%    Values used to calculate the score:      Age: 49 years      Sex: Female      Is Non- : No      Diabetic: No      Tobacco smoker: No      Systolic Blood Pressure: 126  "mmHg      Is BP treated: No      HDL Cholesterol: 65 mg/dL      Total Cholesterol: 174 mg/dL          10/29/2024   Contraception/Family Planning   Questions about contraception or family planning No           Reviewed and updated as needed this visit by Provider                  BP Readings from Last 3 Encounters:   10/29/24 126/86   06/11/24 130/80   05/14/24 124/86    Wt Readings from Last 3 Encounters:   10/29/24 103 kg (227 lb)   09/19/24 102.5 kg (226 lb)   06/11/24 103.5 kg (228 lb 3.2 oz)           Review of Systems  Constitutional, neuro, ENT, endocrine, pulmonary, cardiac, gastrointestinal, genitourinary, musculoskeletal, integument and psychiatric systems are negative, except as otherwise noted.     Objective    Exam  /86 (BP Location: Left arm, Patient Position: Sitting, Cuff Size: Adult Large)   Pulse 80   Temp 98.6  F (37  C) (Oral)   Resp 16   Ht 1.588 m (5' 2.5\")   Wt 103 kg (227 lb)   LMP  (LMP Unknown)   SpO2 97%   BMI 40.86 kg/m     Estimated body mass index is 40.86 kg/m  as calculated from the following:    Height as of this encounter: 1.588 m (5' 2.5\").    Weight as of this encounter: 103 kg (227 lb).    Physical Exam  GENERAL: alert and no distress  EYES: Eyes grossly normal to inspection, and conjunctivae and sclerae normal  HENT: ear canals and TM's normal, nose and mouth without ulcers or lesions  NECK: no adenopathy, no asymmetry, masses, or scars  RESP: lungs clear to auscultation - no rales, rhonchi or wheezes  CV: regular rate and rhythm, normal S1 S2, no S3 or S4, no murmur, click or rub, no peripheral edema  ABDOMEN: soft, nontender, no hepatosplenomegaly, no masses   MS: no gross musculoskeletal defects noted, no edema  SKIN: no suspicious lesions or rashes  NEURO: Normal strength and tone, mentation intact and speech normal  PSYCH: mentation appears normal, affect normal/bright      Signed Electronically by: Yumiko Martino PA-C    "

## 2024-10-30 LAB
T4 FREE SERPL-MCNC: 1.79 NG/DL (ref 0.9–1.7)
TSH SERPL DL<=0.005 MIU/L-ACNC: 0.04 UIU/ML (ref 0.3–4.2)

## 2024-10-31 RX ORDER — LEVOTHYROXINE SODIUM 175 UG/1
175 TABLET ORAL DAILY
Qty: 90 TABLET | Refills: 0 | Status: SHIPPED | OUTPATIENT
Start: 2024-10-31 | End: 2024-11-01

## 2025-01-20 ASSESSMENT — SLEEP AND FATIGUE QUESTIONNAIRES
HOW LIKELY ARE YOU TO NOD OFF OR FALL ASLEEP WHILE SITTING AND TALKING TO SOMEONE: SLIGHT CHANCE OF DOZING
HOW LIKELY ARE YOU TO NOD OFF OR FALL ASLEEP WHILE SITTING INACTIVE IN A PUBLIC PLACE: MODERATE CHANCE OF DOZING
HOW LIKELY ARE YOU TO NOD OFF OR FALL ASLEEP WHILE WATCHING TV: SLIGHT CHANCE OF DOZING
HOW LIKELY ARE YOU TO NOD OFF OR FALL ASLEEP WHILE SITTING QUIETLY AFTER LUNCH WITHOUT ALCOHOL: SLIGHT CHANCE OF DOZING
HOW LIKELY ARE YOU TO NOD OFF OR FALL ASLEEP WHILE LYING DOWN TO REST IN THE AFTERNOON WHEN CIRCUMSTANCES PERMIT: HIGH CHANCE OF DOZING
HOW LIKELY ARE YOU TO NOD OFF OR FALL ASLEEP WHEN YOU ARE A PASSENGER IN A CAR FOR AN HOUR WITHOUT A BREAK: MODERATE CHANCE OF DOZING
HOW LIKELY ARE YOU TO NOD OFF OR FALL ASLEEP WHILE SITTING AND READING: MODERATE CHANCE OF DOZING
HOW LIKELY ARE YOU TO NOD OFF OR FALL ASLEEP IN A CAR, WHILE STOPPED FOR A FEW MINUTES IN TRAFFIC: WOULD NEVER DOZE

## 2025-01-21 ENCOUNTER — MYC REFILL (OUTPATIENT)
Dept: FAMILY MEDICINE | Facility: CLINIC | Age: 50
End: 2025-01-21

## 2025-01-21 ENCOUNTER — OFFICE VISIT (OUTPATIENT)
Dept: SLEEP MEDICINE | Facility: CLINIC | Age: 50
End: 2025-01-21
Attending: INTERNAL MEDICINE
Payer: COMMERCIAL

## 2025-01-21 DIAGNOSIS — E66.01 MORBID OBESITY (H): ICD-10-CM

## 2025-01-21 DIAGNOSIS — R06.83 SNORING: ICD-10-CM

## 2025-01-21 DIAGNOSIS — F41.8 DEPRESSION WITH ANXIETY: ICD-10-CM

## 2025-01-21 DIAGNOSIS — J45.21 MILD INTERMITTENT ASTHMA WITH ACUTE EXACERBATION: ICD-10-CM

## 2025-01-21 DIAGNOSIS — G47.10 DAYTIME HYPERSOMNIA: ICD-10-CM

## 2025-01-21 RX ORDER — ALBUTEROL SULFATE 90 UG/1
2 INHALANT RESPIRATORY (INHALATION) EVERY 4 HOURS PRN
Qty: 18 G | Refills: 11 | Status: CANCELLED | OUTPATIENT
Start: 2025-01-21

## 2025-01-21 NOTE — PROGRESS NOTES
Pt is completing a home sleep test. Pt was instructed on how to put on the Noxturnal T3 device and associated equipment before going to bed and given the opportunity to practice putting it on before leaving the sleep center. Pt was reminded to bring the home sleep test kit back to the center tomorrow, at the scheduled time for download and reporting. Patient was instructed to complete study using the following treatment?  None  Neck circumference: 41 CM / 16 inches.  Device number: 10  Nicolette Guardado MA

## 2025-01-29 ENCOUNTER — TELEPHONE (OUTPATIENT)
Dept: SLEEP MEDICINE | Facility: CLINIC | Age: 50
End: 2025-01-29
Payer: COMMERCIAL

## 2025-01-29 DIAGNOSIS — G47.33 OSA (OBSTRUCTIVE SLEEP APNEA): Primary | ICD-10-CM

## 2025-01-29 DIAGNOSIS — G47.10 DAYTIME HYPERSOMNIA: ICD-10-CM

## 2025-01-29 NOTE — TELEPHONE ENCOUNTER
The patient's sleep study was positive for JORGE. The patient has given me permission to put in an order for CPAP therapy with  [facility] during our last clinic visit. Please call patient to inform them of the change and direct them to follow up with DME. Please also re-schedule the follow up after the patient has at least 7 weeks of usage of CPAP.

## 2025-02-21 ENCOUNTER — MYC MEDICAL ADVICE (OUTPATIENT)
Dept: FAMILY MEDICINE | Facility: CLINIC | Age: 50
End: 2025-02-21
Payer: COMMERCIAL

## 2025-02-23 ENCOUNTER — ANCILLARY PROCEDURE (OUTPATIENT)
Dept: GENERAL RADIOLOGY | Facility: CLINIC | Age: 50
End: 2025-02-23
Attending: PHYSICIAN ASSISTANT
Payer: COMMERCIAL

## 2025-02-23 ENCOUNTER — OFFICE VISIT (OUTPATIENT)
Dept: URGENT CARE | Facility: URGENT CARE | Age: 50
End: 2025-02-23
Payer: COMMERCIAL

## 2025-02-23 VITALS
RESPIRATION RATE: 30 BRPM | SYSTOLIC BLOOD PRESSURE: 166 MMHG | HEART RATE: 90 BPM | BODY MASS INDEX: 38.96 KG/M2 | TEMPERATURE: 97.5 F | OXYGEN SATURATION: 96 % | DIASTOLIC BLOOD PRESSURE: 91 MMHG | WEIGHT: 227 LBS

## 2025-02-23 DIAGNOSIS — J18.9 PNEUMONIA OF RIGHT LOWER LOBE DUE TO INFECTIOUS ORGANISM: Primary | ICD-10-CM

## 2025-02-23 PROCEDURE — 71046 X-RAY EXAM CHEST 2 VIEWS: CPT | Mod: TC | Performed by: RADIOLOGY

## 2025-02-23 PROCEDURE — 99213 OFFICE O/P EST LOW 20 MIN: CPT | Performed by: PHYSICIAN ASSISTANT

## 2025-02-23 RX ORDER — AZITHROMYCIN 250 MG/1
TABLET, FILM COATED ORAL
Qty: 6 TABLET | Refills: 0 | Status: SHIPPED | OUTPATIENT
Start: 2025-02-23 | End: 2025-02-28

## 2025-02-23 RX ORDER — CEFDINIR 300 MG/1
300 CAPSULE ORAL 2 TIMES DAILY
Qty: 20 CAPSULE | Refills: 0 | Status: SHIPPED | OUTPATIENT
Start: 2025-02-23 | End: 2025-03-05

## 2025-02-23 RX ORDER — PREDNISONE 20 MG/1
40 TABLET ORAL DAILY
Qty: 10 TABLET | Refills: 0 | Status: SHIPPED | OUTPATIENT
Start: 2025-02-23 | End: 2025-02-28

## 2025-02-23 NOTE — PROGRESS NOTES
Assessment & Plan:      Problem List Items Addressed This Visit    None  Visit Diagnoses       Pneumonia of right lower lobe due to infectious organism    -  Primary    Relevant Medications    azithromycin (ZITHROMAX) 250 MG tablet    cefdinir (OMNICEF) 300 MG capsule    predniSONE (DELTASONE) 20 MG tablet    Other Relevant Orders    XR Chest 2 Views          Medical Decision Making  Patient with history of asthma presents with cough, wheezing, shortness of breath for 2 months.  Chest x-ray does show signs concerning for right lower lobe pneumonia.  Recommend oral antibiotics and short course of oral steroids.  Discussed treatment and symptomatic care.  Allergies and medication interactions reviewed.  Discussed signs of worsening symptoms and when to follow-up with PCP if no symptom improvement.     Subjective:      Dayan Madison is a 49 year old female with history of asthma, here for evaluation of cough, wheezing, shortness of breath.  Onset of symptoms was 2 months ago.  Patient has had a coughing to the point of emesis on and off since then.  Patient is unable to take a deep breath without triggering a significant coughing spell.  She has been using her at home inhalers with minimal relief.  She does occasionally get night sweats and chills.  No active fevers since the beginning of the illness 2 months ago.     The following portions of the patient's history were reviewed and updated as appropriate: allergies, current medications, and problem list.     Review of Systems  Pertinent items are noted in HPI.    Allergies  Allergies   Allergen Reactions    Seasonal Allergies        Family History   Problem Relation Age of Onset    Factor V Leiden deficiency Mother     Sleep Apnea Mother     Heart Disease Maternal Grandmother          in her 80's    Breast Cancer Maternal Aunt     Breast Cancer Maternal Aunt        Social History     Tobacco Use    Smoking status: Never     Passive exposure: Past    Smokeless  tobacco: Never   Substance Use Topics    Alcohol use: Yes        Objective:      BP (!) 166/91   Pulse 90   Temp 97.5  F (36.4  C) (Oral)   Resp 30   Wt 103 kg (227 lb)   SpO2 96%   BMI 38.96 kg/m    General appearance - alert, well appearing, and in no distress and non-toxic  Ears - bilateral TM's and external ear canals normal  Nose - normal and patent, no erythema, discharge or polyps  Mouth - mucous membranes moist, pharynx normal without lesions  Neck - supple, no significant adenopathy  Chest - clear to auscultation, no wheezes, rales or rhonchi, symmetric air entry  Heart - normal rate, regular rhythm, normal S1, S2, no murmurs, rubs, clicks or gallops     Lab & Imaging Results    No results found for any visits on 02/23/25.    I personally reviewed these results and discussed findings with the patient.    The use of Dragon/[employer] dictation services was used to construct the content of this note; any grammatical errors are non-intentional. Please contact the author directly if you are in need of any clarification.

## 2025-02-24 NOTE — TELEPHONE ENCOUNTER
Patient was seen at the Urgent Care at [facility] and received a prednisone prescription. Sending MobilePro message and signing encounter.    Sandra Gilliland RN  [facility]

## 2025-02-25 ENCOUNTER — DOCUMENTATION ONLY (OUTPATIENT)
Dept: SLEEP MEDICINE | Facility: CLINIC | Age: 50
End: 2025-02-25
Payer: COMMERCIAL

## 2025-02-25 DIAGNOSIS — G47.33 OSA (OBSTRUCTIVE SLEEP APNEA): Primary | ICD-10-CM

## 2025-02-25 NOTE — PROGRESS NOTES
Patient was offered choice of vendor and chose [facility].  Patient Dayan Madison was set up at [facility]  on February 25, 2025. Patient received a Resmed Airsense 10 Pressures were set at  5-15 cm H2O.   Patient s ramp is 5 cm H2O for Auto and FLEX/EPR is EPR, 2.  Patient received a Resmed Mask name: Airfit  F20 Full Face mask size For Her, heated tubing and heated humidifier.  Patient has the following compliance requirements: using and visit requirements  Patient has a follow up due 3/25/25 - 5/24/25 with Dr. Bowman.    Tracy L Fahrenkamp

## 2025-03-02 ENCOUNTER — E-VISIT (OUTPATIENT)
Dept: FAMILY MEDICINE | Facility: CLINIC | Age: 50
End: 2025-03-02
Payer: COMMERCIAL

## 2025-03-02 DIAGNOSIS — B37.31 YEAST INFECTION OF THE VAGINA: Primary | ICD-10-CM

## 2025-03-04 RX ORDER — FLUCONAZOLE 150 MG/1
150 TABLET ORAL ONCE
Qty: 2 TABLET | Refills: 0 | Status: SHIPPED | OUTPATIENT
Start: 2025-03-04 | End: 2025-03-04

## 2025-03-04 NOTE — PATIENT INSTRUCTIONS
Thank you for choosing us for your care. I have placed an order for a prescription so that you can start treatment. View your full visit summary for details by clicking on the link below. Your pharmacist will able to address any questions you may have about the medication.     If you re not feeling better within 2-3 days, please schedule an appointment.  You can schedule an appointment right here in [facility], or call [phone]  If the visit is for the same symptoms as your eVisit, we ll refund the cost of your eVisit if seen within seven days.    Vaginal Yeast Infection: Care Instructions  Overview     A vaginal yeast infection is the growth of too many yeast cells in the vagina. This is a common problem. Itching, vaginal discharge and irritation, and other symptoms can bother you. But yeast infections don't often cause other health problems.  Some medicines can increase your risk of getting a yeast infection. These include antibiotics, hormones, and steroids. You may also be more likely to get a yeast infection if you are pregnant, have diabetes, douche, or wear tight clothes.  With treatment, most yeast infections get better in a few days.  Follow-up care is a key part of your treatment and safety. Be sure to make and go to all appointments, and call your doctor if you are having problems. It's also a good idea to know your test results and keep a list of the medicines you take.  How can you care for yourself at home?  Take your medicines exactly as prescribed. Call your doctor if you think you are having a problem with your medicine.  Ask your doctor about over-the-counter (OTC) medicines for yeast infections. If you use an OTC treatment, read and follow all instructions on the label.  Don't use tampons while using a vaginal cream or suppository. The tampons can absorb the medicine. Use pads instead.  Wear loose cotton clothing. Don't wear nylon or other fabric that holds body heat and moisture close to the  "skin.  Try sleeping without underwear.  Don't scratch. Relieve itching with a cold pack or a cool bath.  Don't wash your vulva more than once a day. Use plain water or a mild, unscented soap. Air-dry the vulva.  Change out of wet or damp clothes as soon as possible.  If you are using a vaginal medicine, don't have sex until you have finished your treatment. But if you do have sex, don't depend on a condom or diaphragm for birth control. The oil in some vaginal medicines weakens latex.  Don't douche or use powders, sprays, or perfumes in your vagina or on your vulva. These items can change the normal balance of organisms in your vagina.  When should you call for help?   Call your doctor now or seek immediate medical care if:    You have new or increased pain in your vagina or pelvis.   Watch closely for changes in your health, and be sure to contact your doctor if:    You have unexpected vaginal bleeding.     You have a fever.     You are not getting better after 2 days.     Your symptoms come back after you finish your medicines.   Where can you learn more?  Go to https://www.[employer].net/patiented  Enter F639 in the search box to learn more about \"Vaginal Yeast Infection: Care Instructions.\"  Current as of: April 30, 2024  Content Version: 14.3    2024 [employer].   Care instructions adapted under license by your healthcare professional. If you have questions about a medical condition or this instruction, always ask your healthcare professional. [employer] disclaims any warranty or liability for your use of this information.    "

## 2025-03-13 ENCOUNTER — DOCUMENTATION ONLY (OUTPATIENT)
Dept: SLEEP MEDICINE | Facility: CLINIC | Age: 50
End: 2025-03-13
Payer: COMMERCIAL

## 2025-03-13 NOTE — PROGRESS NOTES
14 day Sleep therapy management telephone visit    Diagnostic AHI:    HST: 7.7        SUBJECTIVE: Patient reports doing fine with CPAP. She says she feels so tired since starting CPAP. She says she doesn't love the mask she has. She will contact [facility] for assistance Patient was given my contact information and instructed to reach out with any questions or concerns.       Objective measures: 14 day rolling measures   COMPLIANCE LEAK AHI AVERAGE USE IN MINUTES   78 % 13.89 2.86 367   GOAL >70% GOAL < 24 LPM GOAL <5 GOAL >240          Device settings:  CPAP MIN CPAP MAX EPR RESMED SOFT RESPONSE SETTING   5.0 cm  H20 15.0 cm  H20 TWO OFF         Patient has the following upcoming sleep appts:      Replacement device: No  STM ordered by provider: Yes     Total time spent on accessing and  interpreting remote patient PAP therapy data  10 minutes    Total time spent counseling, coaching  and reviewing PAP therapy data with patient  2 minutes  
none

## 2025-03-24 ENCOUNTER — TELEPHONE (OUTPATIENT)
Dept: SLEEP MEDICINE | Facility: CLINIC | Age: 50
End: 2025-03-24
Payer: COMMERCIAL

## 2025-03-24 NOTE — TELEPHONE ENCOUNTER
General Call    Contacts       Contact Date/Time Type Contact Phone/Fax    03/24/2025 01:06 PM CDT Phone (Incoming) Gricelda Dayan HEATHER (Self) [phone] (M)          Reason for Call:  concerns related to follow up appt & insurance related compliance.     What are your questions or concerns:  pt states they were told that for insurance to cover cpap they would need compliance follow up appt by 05/24. Pt is currently scheduled for 06/17/ and is on wait list.  [At time of call writer did check for possible cancellation appts, none found].  Suggested pt contact insurance directly about coverage questions & compliance.      Pt is wondering if there is anything else that can be done or they should do.    Date of last appointment with provider: 02/14/2025    Could we send this information to you in [employer] or would you prefer to receive a phone call?:   Patient would prefer a phone call   Okay to leave a detailed message?: Yes at Cell number on file:    Telephone Information:   Mobile [phone]

## 2025-09-03 ENCOUNTER — MYC REFILL (OUTPATIENT)
Dept: FAMILY MEDICINE | Facility: CLINIC | Age: 50
End: 2025-09-03
Payer: COMMERCIAL

## 2025-09-03 DIAGNOSIS — E89.0 POSTSURGICAL HYPOTHYROIDISM: ICD-10-CM

## 2025-09-04 RX ORDER — LEVOTHYROXINE SODIUM 200 UG/1
200 TABLET ORAL DAILY
Qty: 90 TABLET | Refills: 0 | Status: SHIPPED | OUTPATIENT
Start: 2025-09-04

## (undated) DEVICE — ESU GROUND PAD ADULT W/CORD E7507

## (undated) DEVICE — WIRE GUIDE 0.025"X270CM ANG VISIGLIDE G-240-2527A

## (undated) DEVICE — ENDO BITE BLOCK ADULT OMNI-BLOC

## (undated) DEVICE — ENDO SNARE POLYPECTOMY OVAL 15MM LOOP SD-240U-15

## (undated) DEVICE — ENDO TUBING CO2 SMARTCAP STERILE DISP 100145CO2EXT

## (undated) DEVICE — PACK ENDOSCOPY GI CUSTOM UMMC

## (undated) DEVICE — SUCTION MANIFOLD NEPTUNE 2 SYS 4 PORT 0702-020-000

## (undated) DEVICE — CATH RETRIEVAL BALLOON EXTRACTOR PRO RX-S INJ ABOVE 9-12MM

## (undated) DEVICE — INFLATION DEVICE BIG 60 ENDO-AN6012

## (undated) DEVICE — TUBING SUCTION 10'X3/16" N510

## (undated) DEVICE — Device

## (undated) DEVICE — ENDO CATH BALLOON DILATION HURRICANE 06MMX4X180CM M00545920

## (undated) DEVICE — SYR 03ML LL W/O NDL 309657

## (undated) DEVICE — SOL WATER IRRIG 1000ML BOTTLE 2F7114

## (undated) DEVICE — ENDO CAP AND TUBING STERILE FOR ENDOGATOR  100130

## (undated) DEVICE — ENDO DEVICE LOCKING AND BIOPSY CAP M00545261

## (undated) DEVICE — SOL NACL 0.9% IRRIG 1000ML BOTTLE 07138-09

## (undated) RX ORDER — FENTANYL CITRATE-0.9 % NACL/PF 10 MCG/ML
PLASTIC BAG, INJECTION (ML) INTRAVENOUS
Status: DISPENSED
Start: 2021-06-29

## (undated) RX ORDER — WATER 10 ML/10ML
INJECTION INTRAMUSCULAR; INTRAVENOUS; SUBCUTANEOUS
Status: DISPENSED
Start: 2021-06-29

## (undated) RX ORDER — EPHEDRINE SULFATE 50 MG/ML
INJECTION, SOLUTION INTRAMUSCULAR; INTRAVENOUS; SUBCUTANEOUS
Status: DISPENSED
Start: 2021-06-29

## (undated) RX ORDER — ONDANSETRON 2 MG/ML
INJECTION INTRAMUSCULAR; INTRAVENOUS
Status: DISPENSED
Start: 2021-06-29

## (undated) RX ORDER — ALBUTEROL SULFATE 90 UG/1
AEROSOL, METERED RESPIRATORY (INHALATION)
Status: DISPENSED
Start: 2021-06-29

## (undated) RX ORDER — PROPOFOL 10 MG/ML
INJECTION, EMULSION INTRAVENOUS
Status: DISPENSED
Start: 2021-06-29

## (undated) RX ORDER — FENTANYL CITRATE 50 UG/ML
INJECTION, SOLUTION INTRAMUSCULAR; INTRAVENOUS
Status: DISPENSED
Start: 2021-06-29